# Patient Record
Sex: MALE | Race: WHITE | Employment: OTHER | URBAN - METROPOLITAN AREA
[De-identification: names, ages, dates, MRNs, and addresses within clinical notes are randomized per-mention and may not be internally consistent; named-entity substitution may affect disease eponyms.]

---

## 2019-08-29 ENCOUNTER — OFFICE VISIT (OUTPATIENT)
Dept: FAMILY MEDICINE CLINIC | Age: 39
End: 2019-08-29
Payer: COMMERCIAL

## 2019-08-29 VITALS
HEART RATE: 100 BPM | SYSTOLIC BLOOD PRESSURE: 122 MMHG | BODY MASS INDEX: 38.36 KG/M2 | RESPIRATION RATE: 18 BRPM | DIASTOLIC BLOOD PRESSURE: 84 MMHG | HEIGHT: 71 IN | WEIGHT: 274 LBS
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
## 2019-08-29 DIAGNOSIS — M25.561 CHRONIC PAIN OF RIGHT KNEE: ICD-10-CM

## 2019-08-29 DIAGNOSIS — S86.911A KNEE STRAIN, RIGHT, INITIAL ENCOUNTER: ICD-10-CM

## 2019-08-29 DIAGNOSIS — Z00.00 WELL ADULT EXAM: ICD-10-CM

## 2019-08-29 DIAGNOSIS — S76.311A RIGHT HAMSTRING STRAIN, INITIAL ENCOUNTER: Primary | ICD-10-CM

## 2019-08-29 DIAGNOSIS — G89.29 CHRONIC PAIN OF RIGHT KNEE: ICD-10-CM

## 2019-08-29 PROCEDURE — 99203 OFFICE O/P NEW LOW 30 MIN: CPT | Performed by: NURSE PRACTITIONER

## 2019-08-29 RX ORDER — IBUPROFEN 800 MG/1
800 TABLET ORAL PRN
COMMUNITY
Start: 2019-08-19 | End: 2022-08-25 | Stop reason: SDUPTHER

## 2019-08-29 ASSESSMENT — PATIENT HEALTH QUESTIONNAIRE - PHQ9
1. LITTLE INTEREST OR PLEASURE IN DOING THINGS: 0
SUM OF ALL RESPONSES TO PHQ QUESTIONS 1-9: 0
SUM OF ALL RESPONSES TO PHQ9 QUESTIONS 1 & 2: 0
SUM OF ALL RESPONSES TO PHQ QUESTIONS 1-9: 0
2. FEELING DOWN, DEPRESSED OR HOPELESS: 0

## 2019-08-29 NOTE — PATIENT INSTRUCTIONS
Physical Therapy. MRI of knee. Compression stockings 10 to 15 mmHg. Off Work for 2 weeks. Monitor for worsening symptoms. Call office with concerns. Continue with ibuprofen as needed. Routine fasting blood work. Patient Education        Hamstring Strain: Rehab Exercises  Introduction  Here are some examples of exercises for you to try. The exercises may be suggested for a condition or for rehabilitation. Start each exercise slowly. Ease off the exercises if you start to have pain. You will be told when to start these exercises and which ones will work best for you. How to do the exercises  Hamstring set (heel dig)    1. Sit with your affected leg bent. Your good leg should be straight and supported on the floor. 2. Tighten the muscles on the back of your bent leg (hamstring) by pressing your heel into the floor. 3. Hold for about 6 seconds, and then rest for up to 10 seconds. 4. Repeat 8 to 12 times. Hamstring curl    1. Lie on your stomach with your knees straight. Place a pillow under your stomach. If your kneecap is uncomfortable, roll up a washcloth and put it under your leg just above your kneecap. 2. Lift the foot of your affected leg by bending your knee so that you bring your foot up toward your buttock. If this motion hurts, try it without bending your knee quite as far. This may help you avoid any painful motion. 3. Slowly move your leg up and down. 4. Repeat 8 to 12 times. 5. When you can do this exercise with ease and no pain, add some resistance. To do this:  6. Tie the ends of an exercise band together to form a loop. Attach one end of the loop to a secure object or shut a door on it to hold it in place. (Or you can have someone hold one end of the loop to provide resistance.)  7. Loop the other end of the exercise band around the lower part of your affected leg. 8. Repeat steps 1 through 4, slowly pulling back on the exercise band with your leg.     Hip leg.  2. Keeping your back leg straight and your back heel on the floor, bend your front knee and gently bring your hip and chest toward the wall until you feel a stretch in the calf of your back leg. 3. Hold the stretch for 15 to 30 seconds. 4. Repeat 2 to 4 times. 5. Repeat steps 1 through 4, but this time keep your back knee bent. Single-leg balance    1. Stand on a flat surface with your arms stretched out to your sides like you are making the letter \"T. \" Then lift your good leg off the floor, bending it at the knee. If you are not steady on your feet, use one hand to hold on to a chair, counter, or wall. 2. Standing on your affected leg, keep that knee straight. Try to balance on that leg for up to 30 seconds. Then rest for up to 10 seconds. 3. Repeat 6 to 8 times. 4. When you can balance on your affected leg for 30 seconds with your eyes open, try to balance on it with your eyes closed. 5. When you can do this exercise with your eyes closed for 30 seconds and with ease and no pain, try standing on a pillow or piece of foam, and repeat steps 1 through 4. Follow-up care is a key part of your treatment and safety. Be sure to make and go to all appointments, and call your doctor if you are having problems. It's also a good idea to know your test results and keep a list of the medicines you take. Where can you learn more? Go to https://[employer]. org and sign in to your [employer] account. Enter [id number] in the [facility] box to learn more about \"Hamstring Strain: Rehab Exercises. \"     If you do not have an account, please click on the \"Sign Up Now\" link. Current as of: September 20, 2018  Content Version: 12.1  © 7521-2619 [employer], Inc[employer]. Care instructions adapted under license by [facility] ([facility]).  If you have questions about a medical condition or this instruction, always ask your healthcare professional. [facility] disclaims any warranty or liability for your use of this information. Patient Education        Learning About Venous Insufficiency  What is it? Venous insufficiency is a problem with the flow of blood from the veins of the legs back to the heart. It's also called chronic venous insufficiency or chronic venous stasis. Your veins bring blood back to the heart after it flows through your body. Veins have valves that keep the blood moving in one direction--toward the heart. But with venous insufficiency, the veins of the legs might not work as they should. This can allow blood to leak backward. Fluid can pool in the legs. This can lead to problems that include varicose veins. What causes it? Venous insufficiency is sometimes caused by deep vein thrombosis and high blood pressure inside leg veins. You are more likely to have venous insufficiency if you:  · Are older. · Are female. · Are overweight. · Don't get enough physical activity. · Smoke. · Have a family history of varicose veins. What are the symptoms? Symptoms of venous insufficiency affect the legs. They may include:  · Swelling, often in the ankles. · A rash. · Varicose veins. · Itching. · Cramping. · Skin sores (ulcers). · Aching or a feeling of heaviness. · Changes in skin color. How is it diagnosed? Your doctor can diagnose venous insufficiency by examining your legs and by using a type of ultrasound test (duplex Doppler) to find out how well blood is flowing in your legs. How is it treated? To reduce swelling and relieve pain caused by venous insufficiency, you can wear compression stockings. They are tighter at the ankles than at the top of the legs. They also can help venous skin ulcers heal. But there are different types of stockings, and they need to fit right. So your doctor will recommend what you need. You also can try to:  · Get more exercise, especially walking. It can increase blood flow.   · Avoid standing still or sitting for a long time, which can make the fluid pool in your legs. · Try not to sit with your legs crossed at the knee. · Keep your legs raised above your heart when you're lying down. This reduces swelling. If these treatments don't work, you may need medicine or a procedure to help relieve symptoms. Procedures might be done to close the vein, to remove the vein, or to improve blood flow. Follow-up care is a key part of your treatment and safety. Be sure to make and go to all appointments, and call your doctor if you are having problems. It's also a good idea to know your test results and keep a list of the medicines you take. Current as of: September 26, 2018  Content Version: 12.1  © 0813-9969 [employer]. Care instructions adapted under license by [facility] ([facility]). If you have questions about a medical condition or this instruction, always ask your healthcare professional. [place] [address] any warranty or liability for your use of this information. Patient Education        Learning About Using Compression Stockings  What are compression stockings? Compression stockings may be used for problems like varicose veins, skin ulcers, and deep vein thrombosis. But there are different types of stockings, and they need to fit right. So your doctor will recommend what you need. These stockings are the most common treatment for varicose veins. They help the blood circulate in your legs. This can prevent skin ulcers and keep blood from building up in the legs. Prescription stockings are tightest at the foot. They get less and less tight farther up on your legs. The kind you buy without a prescription have lighter elastic. So the pressure is even all the way up the leg. These don't cost as much. But they don't provide the compression you need to treat serious symptoms or prevent skin ulcers. How do you use compression stockings? · If your stockings are new, you may want to wash them before you put them on.  This

## 2019-08-29 NOTE — LETTER
[address] [facility] [address]  Phone: [phone]  Fax: [phone]    KYA Landis CNP        August 29, 2019     Patient: Ramin Holman   YOB: 1980   Date of Visit: 8/29/2019       To Whom it May Concern:    Ramin Holman was seen in my clinic on 8/29/2019. He may return to work on 9/12/2019. If you have any questions or concerns, please don't hesitate to call.     Sincerely,         KYA Landis CNP

## 2019-08-29 NOTE — PROGRESS NOTES
and moist and mucous membranes are normal. No oropharyngeal exudate. Eyes: Pupils are equal, round, and reactive to light. Conjunctivae are normal. Right eye exhibits no discharge. Left eye exhibits no discharge. Neck: Normal range of motion. Neck supple. No JVD present. Carotid bruit is not present. No tracheal deviation present. No thyromegaly present. Cardiovascular: Normal rate and regular rhythm. No murmur heard. Pulses:       Carotid pulses are 2+ on the right side, and 2+ on the left side. Radial pulses are 2+ on the right side, and 2+ on the left side. Posterior tibial pulses are 2+ on the right side, and 2+ on the left side. Pulmonary/Chest: Effort normal and breath sounds normal. No accessory muscle usage. No respiratory distress. He has no decreased breath sounds. He has no wheezes. Abdominal: Soft. Bowel sounds are normal. He exhibits no distension. There is no tenderness. There is no rebound, no guarding and no CVA tenderness. Musculoskeletal: He exhibits no edema. Right knee: He exhibits swelling. Tenderness found. Legs:  Lymphadenopathy:     He has no cervical adenopathy. Neurological: He is alert and oriented to person, place, and time. He displays no atrophy. He exhibits normal muscle tone. Coordination and gait normal.   Skin: Skin is warm and dry. No rash noted. Psychiatric: He has a normal mood and affect. His speech is normal and behavior is normal. Cognition and memory are normal.   Nursing note and vitals reviewed. Assessment / Plan:     1. Right hamstring strain, initial encounter    - [facility] [place]    2. Knee strain, right, initial encounter    - [facility] Physical Therapy - [place]  - MRI Knee Right WO Contrast; Future    3. Chronic pain of right knee    - [facility] Physical [facility] [place]  - MRI Knee Right WO Contrast; Future    4.  Fall, initial encounter    - MRI Knee Right WO Contrast; Future    5. Well adult exam    - Comprehensive Metabolic Panel, Fasting; Future  - CBC; Future  - Lipid, Fasting; Future    Physical Therapy. MRI of knee. Compression stockings 10 to 15 mmHg. Off Work for 2 weeks. Monitor for worsening symptoms. Call office with concerns. Continue with ibuprofen as needed. Routine fasting blood work. Return in about 2 weeks (around 9/12/2019). Babs Oviedo received counseling on the following healthy behaviors: nutrition, exercise and medication adherence  Reviewed prior labs and health maintenance. Continue current medications, diet and exercise. Discussed use, benefit, and side effects of prescribed medications. Barriers to medication compliance addressed. Patient given educational materials - see patient instructions. All patient questions answered. Patient voiced understanding.            Electronically signed by KYA Santana CNP on 9/8/2019 at 9:25 PM

## 2019-09-06 ENCOUNTER — HOSPITAL ENCOUNTER (OUTPATIENT)
Dept: MRI IMAGING | Facility: CLINIC | Age: 39
Discharge: HOME | End: 2019-09-08
Payer: COMMERCIAL

## 2019-09-06 DIAGNOSIS — G89.29 CHRONIC PAIN OF RIGHT KNEE: ICD-10-CM
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
## 2019-09-06 DIAGNOSIS — S86.911A KNEE STRAIN, RIGHT, INITIAL ENCOUNTER: ICD-10-CM

## 2019-09-06 DIAGNOSIS — R93.6 ABNORMAL MRI, KNEE: Primary | ICD-10-CM

## 2019-09-06 DIAGNOSIS — M25.561 CHRONIC PAIN OF RIGHT KNEE: ICD-10-CM

## 2019-09-06 PROCEDURE — 73721 MRI JNT OF LWR EXTRE W/O DYE: CPT

## 2019-09-08 ASSESSMENT — ENCOUNTER SYMPTOMS
ABDOMINAL PAIN: 0
EYE REDNESS: 0
VOICE CHANGE: 0
SHORTNESS OF BREATH: 0
CHEST TIGHTNESS: 0
CHOKING: 0
EYE ITCHING: 0
DIARRHEA: 0
EYE PAIN: 0
TROUBLE SWALLOWING: 0
COLOR CHANGE: 0
COUGH: 0
BLOOD IN STOOL: 0
VOMITING: 0
CONSTIPATION: 0
PHOTOPHOBIA: 0
NAUSEA: 0
EYE DISCHARGE: 0

## 2019-10-03 ENCOUNTER — HOSPITAL ENCOUNTER (EMERGENCY)
Facility: CLINIC | Age: 39
Discharge: HOME | End: 2019-10-03
Attending: EMERGENCY MEDICINE
Payer: COMMERCIAL

## 2019-10-03 ENCOUNTER — APPOINTMENT (OUTPATIENT)
Dept: CT IMAGING | Facility: CLINIC | Age: 39
End: 2019-10-03
Payer: COMMERCIAL

## 2019-10-03 ENCOUNTER — OFFICE VISIT (OUTPATIENT)
Dept: FAMILY MEDICINE CLINIC | Age: 39
End: 2019-10-03
Payer: COMMERCIAL

## 2019-10-03 ENCOUNTER — HOSPITAL ENCOUNTER (OUTPATIENT)
Age: 39
Setting detail: SPECIMEN
Discharge: HOME | End: 2019-10-03
Payer: COMMERCIAL

## 2019-10-03 ENCOUNTER — APPOINTMENT (OUTPATIENT)
Dept: ULTRASOUND IMAGING | Facility: CLINIC | Age: 39
End: 2019-10-03
Payer: COMMERCIAL

## 2019-10-03 VITALS
DIASTOLIC BLOOD PRESSURE: 84 MMHG | WEIGHT: 276 LBS | TEMPERATURE: 97.3 F | SYSTOLIC BLOOD PRESSURE: 138 MMHG | HEART RATE: 88 BPM | RESPIRATION RATE: 16 BRPM | BODY MASS INDEX: 38.64 KG/M2 | OXYGEN SATURATION: 99 % | HEIGHT: 71 IN

## 2019-10-03 VITALS
DIASTOLIC BLOOD PRESSURE: 84 MMHG | SYSTOLIC BLOOD PRESSURE: 161 MMHG | WEIGHT: 264 LBS | TEMPERATURE: 98.4 F | BODY MASS INDEX: 36.96 KG/M2 | RESPIRATION RATE: 20 BRPM | HEART RATE: 76 BPM | HEIGHT: 71 IN | OXYGEN SATURATION: 100 %

## 2019-10-03 DIAGNOSIS — M54.50 ACUTE RIGHT-SIDED LOW BACK PAIN WITHOUT SCIATICA: Primary | ICD-10-CM

## 2019-10-03 DIAGNOSIS — R10.9 ACUTE RIGHT FLANK PAIN: ICD-10-CM

## 2019-10-03 DIAGNOSIS — R10.9 FLANK PAIN: Primary | ICD-10-CM

## 2019-10-03 DIAGNOSIS — R10.9 FLANK PAIN: ICD-10-CM

## 2019-10-03 LAB
-: ABNORMAL
ABSOLUTE EOS #: 0 K/UL (ref 0–0.4)
ABSOLUTE IMMATURE GRANULOCYTE: ABNORMAL K/UL (ref 0–0.3)
ABSOLUTE LYMPH #: 1.7 K/UL (ref 1–4.8)
ABSOLUTE MONO #: 1.1 K/UL (ref 0.1–1.2)
AMORPHOUS: ABNORMAL
ANION GAP SERPL CALCULATED.3IONS-SCNC: 15 MMOL/L (ref 9–17)
BACTERIA: ABNORMAL
BASOPHILS # BLD: 1 % (ref 0–2)
BASOPHILS ABSOLUTE: 0.1 K/UL (ref 0–0.2)
BILIRUBIN URINE: ABNORMAL
BILIRUBIN, POC: NEGATIVE
BLOOD URINE, POC: ABNORMAL
BUN BLDV-MCNC: 12 MG/DL (ref 6–20)
BUN/CREAT BLD: ABNORMAL (ref 9–20)
CALCIUM SERPL-MCNC: 9.7 MG/DL (ref 8.6–10.4)
CASTS UA: ABNORMAL /LPF (ref 0–2)
CHLORIDE BLD-SCNC: 100 MMOL/L (ref 98–107)
CLARITY, POC: CLEAR
CO2: 23 MMOL/L (ref 20–31)
COLOR, POC: ABNORMAL
COLOR: YELLOW
COMMENT UA: ABNORMAL
CREAT SERPL-MCNC: 0.9 MG/DL (ref 0.7–1.2)
CRYSTALS, UA: ABNORMAL /HPF
DIFFERENTIAL TYPE: ABNORMAL
EOSINOPHILS RELATIVE PERCENT: 0 % (ref 1–4)
EPITHELIAL CELLS UA: ABNORMAL /HPF (ref 0–5)
GFR AFRICAN AMERICAN: >60 ML/MIN
GFR NON-AFRICAN AMERICAN: >60 ML/MIN
GFR SERPL CREATININE-BSD FRML MDRD: ABNORMAL ML/MIN/{1.73_M2}
GFR SERPL CREATININE-BSD FRML MDRD: ABNORMAL ML/MIN/{1.73_M2}
GLUCOSE BLD-MCNC: 141 MG/DL (ref 70–99)
GLUCOSE URINE, POC: NEGATIVE
GLUCOSE URINE: NEGATIVE
HCT VFR BLD CALC: 45.1 % (ref 41–53)
HEMOGLOBIN: 15.3 G/DL (ref 13.5–17.5)
IMMATURE GRANULOCYTES: ABNORMAL %
KETONES, POC: ABNORMAL
KETONES, URINE: ABNORMAL
LEUKOCYTE EST, POC: ABNORMAL
LEUKOCYTE ESTERASE, URINE: NEGATIVE
LYMPHOCYTES # BLD: 12 % (ref 24–44)
MCH RBC QN AUTO: 31.6 PG (ref 26–34)
MCHC RBC AUTO-ENTMCNC: 33.9 G/DL (ref 31–37)
MCV RBC AUTO: 93.1 FL (ref 80–100)
MONOCYTES # BLD: 8 % (ref 2–11)
MUCUS: ABNORMAL
NITRITE, POC: NEGATIVE
NITRITE, URINE: NEGATIVE
NRBC AUTOMATED: ABNORMAL PER 100 WBC
OTHER OBSERVATIONS UA: ABNORMAL
PDW BLD-RTO: 14.1 % (ref 12.5–15.4)
PH UA: 8.5 (ref 5–8)
PH, POC: 6.5
PLATELET # BLD: 247 K/UL (ref 140–450)
PLATELET ESTIMATE: ABNORMAL
PMV BLD AUTO: 9.2 FL (ref 6–12)
POTASSIUM SERPL-SCNC: 3.9 MMOL/L (ref 3.7–5.3)
PROTEIN UA: ABNORMAL
PROTEIN, POC: ABNORMAL
RBC # BLD: 4.85 M/UL (ref 4.5–5.9)
RBC # BLD: ABNORMAL 10*6/UL
RBC UA: ABNORMAL /HPF (ref 0–2)
RENAL EPITHELIAL, UA: ABNORMAL /HPF
SEG NEUTROPHILS: 79 % (ref 36–66)
SEGMENTED NEUTROPHILS ABSOLUTE COUNT: 11.2 K/UL (ref 1.8–7.7)
SODIUM BLD-SCNC: 138 MMOL/L (ref 135–144)
SPECIFIC GRAVITY UA: 1.01 (ref 1–1.03)
SPECIFIC GRAVITY, POC: 1.01
TRICHOMONAS: ABNORMAL
TURBIDITY: CLEAR
URINE HGB: ABNORMAL
UROBILINOGEN, POC: NEGATIVE
UROBILINOGEN, URINE: NORMAL
WBC # BLD: 14.1 K/UL (ref 3.5–11)
WBC # BLD: ABNORMAL 10*3/UL
WBC UA: ABNORMAL /HPF (ref 0–5)
YEAST: ABNORMAL

## 2019-10-03 PROCEDURE — 99213 OFFICE O/P EST LOW 20 MIN: CPT | Performed by: NURSE PRACTITIONER

## 2019-10-03 PROCEDURE — 74176 CT ABD & PELVIS W/O CONTRAST: CPT

## 2019-10-03 PROCEDURE — 96374 THER/PROPH/DIAG INJ IV PUSH: CPT

## 2019-10-03 PROCEDURE — 80048 BASIC METABOLIC PNL TOTAL CA: CPT

## 2019-10-03 PROCEDURE — 6360000002 HC RX W HCPCS: Performed by: EMERGENCY MEDICINE

## 2019-10-03 PROCEDURE — 76870 US EXAM SCROTUM: CPT

## 2019-10-03 PROCEDURE — 96376 TX/PRO/DX INJ SAME DRUG ADON: CPT

## 2019-10-03 PROCEDURE — 81001 URINALYSIS AUTO W/SCOPE: CPT

## 2019-10-03 PROCEDURE — 81002 URINALYSIS NONAUTO W/O SCOPE: CPT | Performed by: NURSE PRACTITIONER

## 2019-10-03 PROCEDURE — 36415 COLL VENOUS BLD VENIPUNCTURE: CPT

## 2019-10-03 PROCEDURE — 96375 TX/PRO/DX INJ NEW DRUG ADDON: CPT

## 2019-10-03 PROCEDURE — 2580000003 HC RX 258: Performed by: EMERGENCY MEDICINE

## 2019-10-03 PROCEDURE — 85025 COMPLETE CBC W/AUTO DIFF WBC: CPT

## 2019-10-03 PROCEDURE — 99284 EMERGENCY DEPT VISIT MOD MDM: CPT

## 2019-10-03 PROCEDURE — 93976 VASCULAR STUDY: CPT

## 2019-10-03 RX ORDER — KETOROLAC TROMETHAMINE 30 MG/ML
30 INJECTION, SOLUTION INTRAMUSCULAR; INTRAVENOUS ONCE
Status: COMPLETED | OUTPATIENT
Start: 2019-10-03 | End: 2019-10-03

## 2019-10-03 RX ORDER — FENTANYL CITRATE 50 UG/ML
50 INJECTION, SOLUTION INTRAMUSCULAR; INTRAVENOUS ONCE
Status: COMPLETED | OUTPATIENT
Start: 2019-10-03 | End: 2019-10-03

## 2019-10-03 RX ORDER — CYCLOBENZAPRINE HCL 10 MG
10 TABLET ORAL 3 TIMES DAILY PRN
Qty: 12 TABLET | Refills: 0 | Status: SHIPPED | OUTPATIENT
Start: 2019-10-03

## 2019-10-03 RX ORDER — HYDROCODONE BITARTRATE AND ACETAMINOPHEN 5; 325 MG/1; MG/1
1 TABLET ORAL EVERY 6 HOURS PRN
Qty: 12 TABLET | Refills: 0 | Status: SHIPPED | OUTPATIENT
Start: 2019-10-03 | End: 2019-10-06

## 2019-10-03 RX ORDER — ONDANSETRON 2 MG/ML
4 INJECTION INTRAMUSCULAR; INTRAVENOUS ONCE
Status: COMPLETED | OUTPATIENT
Start: 2019-10-03 | End: 2019-10-03

## 2019-10-03 RX ORDER — 0.9 % SODIUM CHLORIDE 0.9 %
1000 INTRAVENOUS SOLUTION INTRAVENOUS ONCE
Status: COMPLETED | OUTPATIENT
Start: 2019-10-03 | End: 2019-10-03

## 2019-10-03 RX ADMIN — FENTANYL CITRATE 50 MCG: 50 INJECTION INTRAMUSCULAR; INTRAVENOUS at 12:47

## 2019-10-03 RX ADMIN — ONDANSETRON 4 MG: 2 INJECTION INTRAMUSCULAR; INTRAVENOUS at 11:55

## 2019-10-03 RX ADMIN — SODIUM CHLORIDE 1000 ML: 9 INJECTION, SOLUTION INTRAVENOUS at 11:55

## 2019-10-03 RX ADMIN — FENTANYL CITRATE 50 MCG: 50 INJECTION INTRAMUSCULAR; INTRAVENOUS at 11:56

## 2019-10-03 RX ADMIN — KETOROLAC TROMETHAMINE 30 MG: 30 INJECTION, SOLUTION INTRAMUSCULAR; INTRAVENOUS at 12:43

## 2019-10-03 ASSESSMENT — ENCOUNTER SYMPTOMS
CHEST TIGHTNESS: 0
CONSTIPATION: 0
NAUSEA: 0
SHORTNESS OF BREATH: 0
ABDOMINAL PAIN: 1

## 2019-10-03 ASSESSMENT — PAIN SCALES - GENERAL
PAINLEVEL_OUTOF10: 10
PAINLEVEL_OUTOF10: 6
PAINLEVEL_OUTOF10: 10
PAINLEVEL_OUTOF10: 7
PAINLEVEL_OUTOF10: 10

## 2019-10-03 ASSESSMENT — PAIN DESCRIPTION - LOCATION
LOCATION: BACK

## 2019-10-03 ASSESSMENT — PAIN DESCRIPTION - PROGRESSION
CLINICAL_PROGRESSION: NOT CHANGED
CLINICAL_PROGRESSION: GRADUALLY IMPROVING
CLINICAL_PROGRESSION: GRADUALLY WORSENING
CLINICAL_PROGRESSION: GRADUALLY IMPROVING

## 2019-10-03 ASSESSMENT — PAIN DESCRIPTION - PAIN TYPE
TYPE: ACUTE PAIN
TYPE: ACUTE PAIN

## 2019-10-03 ASSESSMENT — PAIN DESCRIPTION - FREQUENCY: FREQUENCY: CONTINUOUS

## 2019-10-03 ASSESSMENT — PAIN DESCRIPTION - ONSET: ONSET: SUDDEN

## 2019-10-03 ASSESSMENT — PAIN DESCRIPTION - DESCRIPTORS: DESCRIPTORS: CONSTANT

## 2019-10-03 ASSESSMENT — PAIN DESCRIPTION - ORIENTATION: ORIENTATION: RIGHT;LOWER

## 2019-10-04 ENCOUNTER — OFFICE VISIT (OUTPATIENT)
Dept: FAMILY MEDICINE CLINIC | Age: 39
End: 2019-10-04
Payer: COMMERCIAL

## 2019-10-04 VITALS
DIASTOLIC BLOOD PRESSURE: 80 MMHG | OXYGEN SATURATION: 98 % | SYSTOLIC BLOOD PRESSURE: 128 MMHG | HEIGHT: 71 IN | HEART RATE: 82 BPM | WEIGHT: 270 LBS | BODY MASS INDEX: 37.8 KG/M2

## 2019-10-04 DIAGNOSIS — R31.1 BENIGN ESSENTIAL MICROSCOPIC HEMATURIA: ICD-10-CM

## 2019-10-04 DIAGNOSIS — Z11.4 SCREENING FOR HIV (HUMAN IMMUNODEFICIENCY VIRUS): ICD-10-CM

## 2019-10-04 DIAGNOSIS — M54.50 ACUTE BILATERAL LOW BACK PAIN WITHOUT SCIATICA: Primary | ICD-10-CM

## 2019-10-04 LAB
CULTURE: NORMAL
Lab: NORMAL
SPECIMEN DESCRIPTION: NORMAL

## 2019-10-04 PROCEDURE — 99213 OFFICE O/P EST LOW 20 MIN: CPT | Performed by: NURSE PRACTITIONER

## 2019-10-07 ENCOUNTER — HOSPITAL ENCOUNTER (OUTPATIENT)
Age: 39
Setting detail: SPECIMEN
Discharge: HOME | End: 2019-10-07
Payer: COMMERCIAL

## 2019-10-07 DIAGNOSIS — Z00.00 WELL ADULT EXAM: ICD-10-CM

## 2019-10-07 DIAGNOSIS — Z11.4 SCREENING FOR HIV (HUMAN IMMUNODEFICIENCY VIRUS): ICD-10-CM

## 2019-10-07 LAB
ALBUMIN SERPL-MCNC: 3.9 G/DL (ref 3.5–5.2)
ALBUMIN/GLOBULIN RATIO: 1.3 (ref 1–2.5)
ALP BLD-CCNC: 86 U/L (ref 40–129)
ALT SERPL-CCNC: 14 U/L (ref 5–41)
ANION GAP SERPL CALCULATED.3IONS-SCNC: 14 MMOL/L (ref 9–17)
AST SERPL-CCNC: 14 U/L
BILIRUB SERPL-MCNC: 0.53 MG/DL (ref 0.3–1.2)
BUN BLDV-MCNC: 10 MG/DL (ref 6–20)
BUN/CREAT BLD: NORMAL (ref 9–20)
CALCIUM SERPL-MCNC: 9.4 MG/DL (ref 8.6–10.4)
CHLORIDE BLD-SCNC: 100 MMOL/L (ref 98–107)
CHOLESTEROL, FASTING: 164 MG/DL
CHOLESTEROL/HDL RATIO: 3.3
CO2: 24 MMOL/L (ref 20–31)
CREAT SERPL-MCNC: 0.82 MG/DL (ref 0.7–1.2)
GFR AFRICAN AMERICAN: >60 ML/MIN
GFR NON-AFRICAN AMERICAN: >60 ML/MIN
GFR SERPL CREATININE-BSD FRML MDRD: NORMAL ML/MIN/{1.73_M2}
GFR SERPL CREATININE-BSD FRML MDRD: NORMAL ML/MIN/{1.73_M2}
GLUCOSE FASTING: 91 MG/DL (ref 70–99)
HCT VFR BLD CALC: 45 % (ref 40.7–50.3)
HDLC SERPL-MCNC: 50 MG/DL
HEMOGLOBIN: 14.7 G/DL (ref 13–17)
HIV AG/AB: NONREACTIVE
LDL CHOLESTEROL: 103 MG/DL (ref 0–130)
MCH RBC QN AUTO: 31.4 PG (ref 25.2–33.5)
MCHC RBC AUTO-ENTMCNC: 32.7 G/DL (ref 28.4–34.8)
MCV RBC AUTO: 96.2 FL (ref 82.6–102.9)
NRBC AUTOMATED: 0 PER 100 WBC
PDW BLD-RTO: 13.2 % (ref 11.8–14.4)
PLATELET # BLD: 264 K/UL (ref 138–453)
PMV BLD AUTO: 11 FL (ref 8.1–13.5)
POTASSIUM SERPL-SCNC: 4 MMOL/L (ref 3.7–5.3)
RBC # BLD: 4.68 M/UL (ref 4.21–5.77)
SODIUM BLD-SCNC: 138 MMOL/L (ref 135–144)
TOTAL PROTEIN: 6.9 G/DL (ref 6.4–8.3)
TRIGLYCERIDE, FASTING: 54 MG/DL
VLDLC SERPL CALC-MCNC: NORMAL MG/DL (ref 1–30)
WBC # BLD: 7.6 K/UL (ref 3.5–11.3)

## 2019-10-07 ASSESSMENT — ENCOUNTER SYMPTOMS
CONSTIPATION: 0
BACK PAIN: 1
SHORTNESS OF BREATH: 0
ABDOMINAL PAIN: 1
CHEST TIGHTNESS: 0
NAUSEA: 0

## 2020-07-25 ENCOUNTER — APPOINTMENT (OUTPATIENT)
Dept: GENERAL RADIOLOGY | Age: 40
DRG: 342 | End: 2020-07-25
Payer: MEDICAID

## 2020-07-25 ENCOUNTER — APPOINTMENT (OUTPATIENT)
Dept: CT IMAGING | Age: 40
DRG: 342 | End: 2020-07-25
Payer: MEDICAID

## 2020-07-25 ENCOUNTER — HOSPITAL ENCOUNTER (INPATIENT)
Age: 40
LOS: 4 days | Discharge: HOME | DRG: 342 | End: 2020-07-29
Attending: EMERGENCY MEDICINE | Admitting: SURGERY
Payer: MEDICAID

## 2020-07-25 PROBLEM — T14.90XA TRAUMA: Status: ACTIVE | Noted: 2020-07-25

## 2020-07-25 LAB
ABO/RH: NORMAL
ALLEN TEST: ABNORMAL
AMPHETAMINE SCREEN URINE: NEGATIVE
ANION GAP SERPL CALCULATED.3IONS-SCNC: 12 MMOL/L (ref 9–17)
ANTIBODY SCREEN: NEGATIVE
ARM BAND NUMBER: NORMAL
BARBITURATE SCREEN URINE: NEGATIVE
BENZODIAZEPINE SCREEN, URINE: NEGATIVE
BILIRUBIN URINE: NEGATIVE
BLOOD BANK SPECIMEN: ABNORMAL
BUN BLDV-MCNC: 10 MG/DL (ref 6–20)
BUPRENORPHINE URINE: ABNORMAL
CANNABINOID SCREEN URINE: POSITIVE
CARBOXYHEMOGLOBIN: 5 % (ref 0–5)
CHLORIDE BLD-SCNC: 111 MMOL/L (ref 98–107)
CO2: 19 MMOL/L (ref 20–31)
COCAINE METABOLITE, URINE: NEGATIVE
COLOR: YELLOW
COMMENT UA: NORMAL
CREAT SERPL-MCNC: 0.77 MG/DL (ref 0.7–1.2)
ETHANOL PERCENT: 0.1 %
ETHANOL: 97 MG/DL
EXPIRATION DATE: NORMAL
FIO2: ABNORMAL
GFR AFRICAN AMERICAN: ABNORMAL ML/MIN
GFR NON-AFRICAN AMERICAN: ABNORMAL ML/MIN
GFR SERPL CREATININE-BSD FRML MDRD: ABNORMAL ML/MIN/{1.73_M2}
GFR SERPL CREATININE-BSD FRML MDRD: ABNORMAL ML/MIN/{1.73_M2}
GLUCOSE BLD-MCNC: 103 MG/DL (ref 70–99)
GLUCOSE URINE: NEGATIVE
HCG QUALITATIVE: ABNORMAL
HCO3 VENOUS: 23.5 MMOL/L (ref 24–30)
HCT VFR BLD CALC: 37.6 % (ref 40.7–50.3)
HEMOGLOBIN: 12.3 G/DL (ref 13–17)
INR BLD: 1.1
KETONES, URINE: NEGATIVE
LEUKOCYTE ESTERASE, URINE: NEGATIVE
MCH RBC QN AUTO: 31.9 PG (ref 25.2–33.5)
MCHC RBC AUTO-ENTMCNC: 32.7 G/DL (ref 28.4–34.8)
MCV RBC AUTO: 97.4 FL (ref 82.6–102.9)
MDMA URINE: ABNORMAL
METHADONE SCREEN, URINE: NEGATIVE
METHAMPHETAMINE, URINE: ABNORMAL
METHEMOGLOBIN: ABNORMAL % (ref 0–1.5)
MODE: ABNORMAL
NEGATIVE BASE EXCESS, VEN: ABNORMAL MMOL/L (ref 0–2)
NITRITE, URINE: NEGATIVE
NOTIFICATION TIME: ABNORMAL
NOTIFICATION: ABNORMAL
NRBC AUTOMATED: 0 PER 100 WBC
O2 DEVICE/FLOW/%: ABNORMAL
O2 SAT, VEN: 95.4 % (ref 60–85)
OPIATES, URINE: NEGATIVE
OXYCODONE SCREEN URINE: NEGATIVE
OXYHEMOGLOBIN: ABNORMAL % (ref 95–98)
PARTIAL THROMBOPLASTIN TIME: 22.5 SEC (ref 20.5–30.5)
PATIENT TEMP: 37
PCO2, VEN, TEMP ADJ: ABNORMAL MMHG (ref 39–55)
PCO2, VEN: 34.5 (ref 39–55)
PDW BLD-RTO: 14.6 % (ref 11.8–14.4)
PEEP/CPAP: ABNORMAL
PH UA: 6.5 (ref 5–8)
PH VENOUS: 7.45 (ref 7.32–7.42)
PH, VEN, TEMP ADJ: ABNORMAL (ref 7.32–7.42)
PHENCYCLIDINE, URINE: NEGATIVE
PLATELET # BLD: 202 K/UL (ref 138–453)
PMV BLD AUTO: 10.2 FL (ref 8.1–13.5)
PO2, VEN, TEMP ADJ: ABNORMAL MMHG (ref 30–50)
PO2, VEN: 71.4 (ref 30–50)
POSITIVE BASE EXCESS, VEN: 0.5 MMOL/L (ref 0–2)
POTASSIUM SERPL-SCNC: 3.3 MMOL/L (ref 3.7–5.3)
PROPOXYPHENE, URINE: ABNORMAL
PROTEIN UA: NEGATIVE
PROTHROMBIN TIME: 11.5 SEC (ref 9–12)
PSV: ABNORMAL
PT. POSITION: ABNORMAL
RBC # BLD: 3.86 M/UL (ref 4.21–5.77)
RESPIRATORY RATE: ABNORMAL
SAMPLE SITE: ABNORMAL
SARS-COV-2, PCR: NORMAL
SARS-COV-2, RAPID: NOT DETECTED
SARS-COV-2: NORMAL
SET RATE: ABNORMAL
SODIUM BLD-SCNC: 142 MMOL/L (ref 135–144)
SOURCE: NORMAL
SPECIFIC GRAVITY UA: 1.02 (ref 1–1.03)
TEST INFORMATION: ABNORMAL
TEXT FOR RESPIRATORY: ABNORMAL
TOTAL HB: ABNORMAL G/DL (ref 12–16)
TOTAL RATE: ABNORMAL
TRICYCLIC ANTIDEPRESSANTS, UR: ABNORMAL
TURBIDITY: CLEAR
URINE HGB: NEGATIVE
UROBILINOGEN, URINE: NORMAL
VT: ABNORMAL
WBC # BLD: 8.9 K/UL (ref 3.5–11.3)

## 2020-07-25 PROCEDURE — 73562 X-RAY EXAM OF KNEE 3: CPT

## 2020-07-25 PROCEDURE — 99285 EMERGENCY DEPT VISIT HI MDM: CPT

## 2020-07-25 PROCEDURE — 6360000004 HC RX CONTRAST MEDICATION: Performed by: STUDENT IN AN ORGANIZED HEALTH CARE EDUCATION/TRAINING PROGRAM

## 2020-07-25 PROCEDURE — 72131 CT LUMBAR SPINE W/O DYE: CPT

## 2020-07-25 PROCEDURE — 73590 X-RAY EXAM OF LOWER LEG: CPT

## 2020-07-25 PROCEDURE — 81003 URINALYSIS AUTO W/O SCOPE: CPT

## 2020-07-25 PROCEDURE — 96376 TX/PRO/DX INJ SAME DRUG ADON: CPT

## 2020-07-25 PROCEDURE — 6370000000 HC RX 637 (ALT 250 FOR IP): Performed by: STUDENT IN AN ORGANIZED HEALTH CARE EDUCATION/TRAINING PROGRAM

## 2020-07-25 PROCEDURE — 86901 BLOOD TYPING SEROLOGIC RH(D): CPT

## 2020-07-25 PROCEDURE — 6360000002 HC RX W HCPCS: Performed by: GENERAL PRACTICE

## 2020-07-25 PROCEDURE — 1200000000 HC SEMI PRIVATE

## 2020-07-25 PROCEDURE — 82947 ASSAY GLUCOSE BLOOD QUANT: CPT

## 2020-07-25 PROCEDURE — G0480 DRUG TEST DEF 1-7 CLASSES: HCPCS

## 2020-07-25 PROCEDURE — 96375 TX/PRO/DX INJ NEW DRUG ADDON: CPT

## 2020-07-25 PROCEDURE — 70450 CT HEAD/BRAIN W/O DYE: CPT

## 2020-07-25 PROCEDURE — 86850 RBC ANTIBODY SCREEN: CPT

## 2020-07-25 PROCEDURE — 84520 ASSAY OF UREA NITROGEN: CPT

## 2020-07-25 PROCEDURE — 85027 COMPLETE CBC AUTOMATED: CPT

## 2020-07-25 PROCEDURE — 71045 X-RAY EXAM CHEST 1 VIEW: CPT

## 2020-07-25 PROCEDURE — 82805 BLOOD GASES W/O2 SATURATION: CPT

## 2020-07-25 PROCEDURE — 72125 CT NECK SPINE W/O DYE: CPT

## 2020-07-25 PROCEDURE — 6810039001 HC L1 TRAUMA PRIORITY

## 2020-07-25 PROCEDURE — 84703 CHORIONIC GONADOTROPIN ASSAY: CPT

## 2020-07-25 PROCEDURE — 86900 BLOOD TYPING SEROLOGIC ABO: CPT

## 2020-07-25 PROCEDURE — 96374 THER/PROPH/DIAG INJ IV PUSH: CPT

## 2020-07-25 PROCEDURE — 73700 CT LOWER EXTREMITY W/O DYE: CPT

## 2020-07-25 PROCEDURE — 73610 X-RAY EXAM OF ANKLE: CPT

## 2020-07-25 PROCEDURE — 74177 CT ABD & PELVIS W/CONTRAST: CPT

## 2020-07-25 PROCEDURE — 85610 PROTHROMBIN TIME: CPT

## 2020-07-25 PROCEDURE — 80051 ELECTROLYTE PANEL: CPT

## 2020-07-25 PROCEDURE — 72128 CT CHEST SPINE W/O DYE: CPT

## 2020-07-25 PROCEDURE — 80307 DRUG TEST PRSMV CHEM ANLYZR: CPT

## 2020-07-25 PROCEDURE — 82565 ASSAY OF CREATININE: CPT

## 2020-07-25 PROCEDURE — U0002 COVID-19 LAB TEST NON-CDC: HCPCS

## 2020-07-25 PROCEDURE — 85730 THROMBOPLASTIN TIME PARTIAL: CPT

## 2020-07-25 RX ORDER — PROMETHAZINE HYDROCHLORIDE 25 MG/1
12.5 TABLET ORAL EVERY 6 HOURS PRN
Status: DISCONTINUED | OUTPATIENT
Start: 2020-07-25 | End: 2020-07-27

## 2020-07-25 RX ORDER — POLYETHYLENE GLYCOL 3350 17 G/17G
17 POWDER, FOR SOLUTION ORAL DAILY PRN
Status: DISCONTINUED | OUTPATIENT
Start: 2020-07-25 | End: 2020-07-27

## 2020-07-25 RX ORDER — MORPHINE SULFATE 4 MG/ML
4 INJECTION, SOLUTION INTRAMUSCULAR; INTRAVENOUS ONCE
Status: COMPLETED | OUTPATIENT
Start: 2020-07-25 | End: 2020-07-25

## 2020-07-25 RX ORDER — ACETAMINOPHEN 500 MG
1000 TABLET ORAL EVERY 8 HOURS SCHEDULED
Status: DISCONTINUED | OUTPATIENT
Start: 2020-07-25 | End: 2020-07-29 | Stop reason: HOSPADM

## 2020-07-25 RX ORDER — ONDANSETRON 2 MG/ML
4 INJECTION INTRAMUSCULAR; INTRAVENOUS EVERY 6 HOURS PRN
Status: DISCONTINUED | OUTPATIENT
Start: 2020-07-25 | End: 2020-07-29 | Stop reason: HOSPADM

## 2020-07-25 RX ORDER — ONDANSETRON 2 MG/ML
4 INJECTION INTRAMUSCULAR; INTRAVENOUS ONCE
Status: COMPLETED | OUTPATIENT
Start: 2020-07-25 | End: 2020-07-25

## 2020-07-25 RX ORDER — SODIUM CHLORIDE 0.9 % (FLUSH) 0.9 %
10 SYRINGE (ML) INJECTION EVERY 12 HOURS SCHEDULED
Status: DISCONTINUED | OUTPATIENT
Start: 2020-07-25 | End: 2020-07-29 | Stop reason: HOSPADM

## 2020-07-25 RX ORDER — SODIUM CHLORIDE 0.9 % (FLUSH) 0.9 %
10 SYRINGE (ML) INJECTION PRN
Status: DISCONTINUED | OUTPATIENT
Start: 2020-07-25 | End: 2020-07-29 | Stop reason: HOSPADM

## 2020-07-25 RX ORDER — OXYCODONE HYDROCHLORIDE 5 MG/1
5 TABLET ORAL EVERY 4 HOURS PRN
Status: DISCONTINUED | OUTPATIENT
Start: 2020-07-25 | End: 2020-07-29 | Stop reason: HOSPADM

## 2020-07-25 RX ADMIN — ACETAMINOPHEN 1000 MG: 500 TABLET ORAL at 23:22

## 2020-07-25 RX ADMIN — ONDANSETRON 4 MG: 2 INJECTION, SOLUTION INTRAMUSCULAR; INTRAVENOUS at 19:27

## 2020-07-25 RX ADMIN — MORPHINE SULFATE 4 MG: 4 INJECTION INTRAVENOUS at 22:39

## 2020-07-25 RX ADMIN — MORPHINE SULFATE 4 MG: 4 INJECTION, SOLUTION INTRAMUSCULAR; INTRAVENOUS at 19:27

## 2020-07-25 RX ADMIN — IOHEXOL 130 ML: 350 INJECTION, SOLUTION INTRAVENOUS at 16:58

## 2020-07-25 RX ADMIN — OXYCODONE HYDROCHLORIDE 5 MG: 5 TABLET ORAL at 23:22

## 2020-07-25 ASSESSMENT — PAIN SCALES - GENERAL
PAINLEVEL_OUTOF10: 8

## 2020-07-25 NOTE — ED NOTES
Bed: 16  Expected date:   Expected time:   Means of arrival:   Comments:     Alina Kumar RN  07/25/20 2135
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
## 2020-07-26 ENCOUNTER — APPOINTMENT (OUTPATIENT)
Dept: MRI IMAGING | Age: 40
DRG: 342 | End: 2020-07-26
Payer: MEDICAID

## 2020-07-26 ENCOUNTER — APPOINTMENT (OUTPATIENT)
Dept: CT IMAGING | Age: 40
DRG: 342 | End: 2020-07-26
Payer: MEDICAID

## 2020-07-26 LAB
ABSOLUTE EOS #: 0.1 K/UL (ref 0–0.44)
ABSOLUTE IMMATURE GRANULOCYTE: 0.05 K/UL (ref 0–0.3)
ABSOLUTE LYMPH #: 2.08 K/UL (ref 1.1–3.7)
ABSOLUTE MONO #: 1.17 K/UL (ref 0.1–1.2)
ANION GAP SERPL CALCULATED.3IONS-SCNC: 8 MMOL/L (ref 9–17)
BASOPHILS # BLD: 0 % (ref 0–2)
BASOPHILS ABSOLUTE: 0.04 K/UL (ref 0–0.2)
BUN BLDV-MCNC: 9 MG/DL (ref 6–20)
BUN/CREAT BLD: ABNORMAL (ref 9–20)
CALCIUM SERPL-MCNC: 8.1 MG/DL (ref 8.6–10.4)
CHLORIDE BLD-SCNC: 102 MMOL/L (ref 98–107)
CO2: 27 MMOL/L (ref 20–31)
CREAT SERPL-MCNC: 0.72 MG/DL (ref 0.7–1.2)
DIFFERENTIAL TYPE: ABNORMAL
EOSINOPHILS RELATIVE PERCENT: 1 % (ref 1–4)
GFR AFRICAN AMERICAN: >60 ML/MIN
GFR NON-AFRICAN AMERICAN: >60 ML/MIN
GFR SERPL CREATININE-BSD FRML MDRD: ABNORMAL ML/MIN/{1.73_M2}
GFR SERPL CREATININE-BSD FRML MDRD: ABNORMAL ML/MIN/{1.73_M2}
GLUCOSE BLD-MCNC: 100 MG/DL (ref 70–99)
HCT VFR BLD CALC: 43.1 % (ref 40.7–50.3)
HEMOGLOBIN: 14.1 G/DL (ref 13–17)
IMMATURE GRANULOCYTES: 1 %
LYMPHOCYTES # BLD: 22 % (ref 24–43)
MCH RBC QN AUTO: 31.8 PG (ref 25.2–33.5)
MCHC RBC AUTO-ENTMCNC: 32.7 G/DL (ref 28.4–34.8)
MCV RBC AUTO: 97.1 FL (ref 82.6–102.9)
MONOCYTES # BLD: 12 % (ref 3–12)
NRBC AUTOMATED: 0 PER 100 WBC
PDW BLD-RTO: 14.6 % (ref 11.8–14.4)
PLATELET # BLD: 218 K/UL (ref 138–453)
PLATELET ESTIMATE: ABNORMAL
PMV BLD AUTO: 10.2 FL (ref 8.1–13.5)
POTASSIUM SERPL-SCNC: 4 MMOL/L (ref 3.7–5.3)
RBC # BLD: 4.44 M/UL (ref 4.21–5.77)
RBC # BLD: ABNORMAL 10*6/UL
SEG NEUTROPHILS: 64 % (ref 36–65)
SEGMENTED NEUTROPHILS ABSOLUTE COUNT: 6.15 K/UL (ref 1.5–8.1)
SODIUM BLD-SCNC: 137 MMOL/L (ref 135–144)
VITAMIN D 25-HYDROXY: 28.2 NG/ML (ref 30–100)
WBC # BLD: 9.6 K/UL (ref 3.5–11.3)
WBC # BLD: ABNORMAL 10*3/UL

## 2020-07-26 PROCEDURE — 73706 CT ANGIO LWR EXTR W/O&W/DYE: CPT

## 2020-07-26 PROCEDURE — 73721 MRI JNT OF LWR EXTRE W/O DYE: CPT

## 2020-07-26 PROCEDURE — 82306 VITAMIN D 25 HYDROXY: CPT

## 2020-07-26 PROCEDURE — 6360000002 HC RX W HCPCS: Performed by: STUDENT IN AN ORGANIZED HEALTH CARE EDUCATION/TRAINING PROGRAM

## 2020-07-26 PROCEDURE — 1200000000 HC SEMI PRIVATE

## 2020-07-26 PROCEDURE — 6370000000 HC RX 637 (ALT 250 FOR IP): Performed by: STUDENT IN AN ORGANIZED HEALTH CARE EDUCATION/TRAINING PROGRAM

## 2020-07-26 PROCEDURE — 6360000004 HC RX CONTRAST MEDICATION: Performed by: NURSE PRACTITIONER

## 2020-07-26 PROCEDURE — 80048 BASIC METABOLIC PNL TOTAL CA: CPT

## 2020-07-26 PROCEDURE — 85025 COMPLETE CBC W/AUTO DIFF WBC: CPT

## 2020-07-26 PROCEDURE — 2580000003 HC RX 258: Performed by: STUDENT IN AN ORGANIZED HEALTH CARE EDUCATION/TRAINING PROGRAM

## 2020-07-26 RX ORDER — MORPHINE SULFATE 4 MG/ML
4 INJECTION, SOLUTION INTRAMUSCULAR; INTRAVENOUS ONCE
Status: COMPLETED | OUTPATIENT
Start: 2020-07-26 | End: 2020-07-26

## 2020-07-26 RX ADMIN — OXYCODONE HYDROCHLORIDE 5 MG: 5 TABLET ORAL at 15:06

## 2020-07-26 RX ADMIN — ENOXAPARIN SODIUM 30 MG: 30 INJECTION SUBCUTANEOUS at 21:50

## 2020-07-26 RX ADMIN — OXYCODONE HYDROCHLORIDE 5 MG: 5 TABLET ORAL at 11:15

## 2020-07-26 RX ADMIN — OXYCODONE HYDROCHLORIDE 5 MG: 5 TABLET ORAL at 19:57

## 2020-07-26 RX ADMIN — SODIUM CHLORIDE, PRESERVATIVE FREE 10 ML: 5 INJECTION INTRAVENOUS at 09:00

## 2020-07-26 RX ADMIN — IOHEXOL 110 ML: 350 INJECTION, SOLUTION INTRAVENOUS at 13:50

## 2020-07-26 RX ADMIN — ENOXAPARIN SODIUM 30 MG: 30 INJECTION SUBCUTANEOUS at 14:13

## 2020-07-26 RX ADMIN — ACETAMINOPHEN 1000 MG: 500 TABLET ORAL at 21:51

## 2020-07-26 RX ADMIN — MORPHINE SULFATE 4 MG: 4 INJECTION INTRAVENOUS at 01:37

## 2020-07-26 RX ADMIN — SODIUM CHLORIDE, PRESERVATIVE FREE 10 ML: 5 INJECTION INTRAVENOUS at 21:51

## 2020-07-26 RX ADMIN — ACETAMINOPHEN 1000 MG: 500 TABLET ORAL at 14:16

## 2020-07-26 RX ADMIN — OXYCODONE HYDROCHLORIDE 5 MG: 5 TABLET ORAL at 07:24

## 2020-07-26 RX ADMIN — OXYCODONE HYDROCHLORIDE 5 MG: 5 TABLET ORAL at 03:45

## 2020-07-26 SDOH — HEALTH STABILITY: MENTAL HEALTH: HOW OFTEN DO YOU HAVE A DRINK CONTAINING ALCOHOL?: 2-3 TIMES A WEEK

## 2020-07-26 ASSESSMENT — PAIN SCALES - GENERAL
PAINLEVEL_OUTOF10: 8
PAINLEVEL_OUTOF10: 7
PAINLEVEL_OUTOF10: 6
PAINLEVEL_OUTOF10: 8
PAINLEVEL_OUTOF10: 7
PAINLEVEL_OUTOF10: 8
PAINLEVEL_OUTOF10: 8

## 2020-07-26 NOTE — PROGRESS NOTES
Speech Language Pathology  [facility] [address]  Speech Language Pathology    COGNITIVE ASSESSMENT    NO LOC or CHI- ST TO DEFER AT THIS TIME      Date: 7/26/2020  Patient Name: Denzel Interiano  YOB: 1980   AGE: 44 y.o. MRN: [id number]        PT NOT SEEN FOR COGNITIVE ASSESSMENT AS NO LOC OR CHI IS DOCUMENTED. ST TO DEFER AT THIS TIME.        Genesis Escamilla M.S. [id number] [facility]    7/26/2020  8:17 AM
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
## 2020-07-26 NOTE — PROGRESS NOTES
Trauma Tertiary Survey    Admit Date: 7/25/2020  Hospital day 1    [facility]     No past medical history on file. Scheduled Meds:   sodium chloride flush  10 mL Intravenous 2 times per day    acetaminophen  1,000 mg Oral 3 times per day    enoxaparin  30 mg Subcutaneous BID     Continuous Infusions:  PRN Meds:sodium chloride flush, polyethylene glycol, promethazine **OR** ondansetron, oxyCODONE    Subjective:     Patient has complaints of bilateral lower leg pain. .  Pain is moderate, worsens with movement, and some relief by rest and pain medication. There is not associated numbness, tingling, weakness in the right lower extremity. Patient does report some  Weakness with Moving the left lower extremity especially with dorsiflexion. objective:   No data found. No intake/output data recorded. No intake/output data recorded. Radiology:  RT Knee Xray  Impression    Possible nondisplaced tibial plateau fracture      Lt Knee MRI  Impression    1. Complex tear in the posterior horn of the medial meniscus extending to the    undersurface and body/posterior horn junction. 2. Complex tear in the anterior horn of the lateral meniscus. 3. Full-thickness ACL tear. 4. High-grade partial thickness/full-thickness tear of the PCL at the femoral    attachment. 5. Full-thickness tear of the popliteus tendon. 6. High-grade partial tear of the fibulocollateral ligament.     7. MCL sprain.           CTA LLE   Impression    No acute arterial abnormality in the left lower extremity.                 PHYSICAL EXAM:   GCS:  4 - Opens eyes on own   6 - Follows simple motor commands  5 - Alert and oriented    Pupil size:  Left 5 mm Right 5 mm  Pupil reaction: Yes  Wiggles fingers: Left Yes Right Yes  Hand grasp:   Left normal   Right normal,  Wiggles toes: Left Yes    Right Yes  Plantar flexion: Left normal  Right normal    General Appearance: AOx3, well-developed, well-nourished, no acute distress  Skin: warm, dry, no rash, no erythema, no contusions  Neurologic: CN II-XII grossly intact, no focal senory or motor deficits, moves all extremities spontaneously, speech normal  Head: normocephalic, atraumatic, no Pineda's sign, no Raccoon eyes  Eyes: pupils equal, round, and reactive to light; EOMI; conjunctivae normal  ENT: tympanic membrane, external ear, and canal normal bilaterally; nose without deformities  Neck: supple, non-tender without mass, no crepitus, trachea midline  Pulmonary: clear to auscultation bilaterally; equal air entry bilaterally; no wheezes, rales, or rhonchi; no acute respiratory distress  Cardiovascular: regular rate and rhythm; no murmurs, rubs, or clicks  Chest: non-tender to palpation bilaterally, no crepitus bilaterally  Abdomen: soft, non-tender, non-distended, normal bowel sounds, no contusions, no seatbelt sign  Pelvis: non-tender, stable  Rectal: normal tone, no gross blood  Extremities: no cyanosis; no edema; no joint swelling, deformities, or tenderness; radial, femoral, and DP pulses intact bilaterally  Musculoskeletal: normal range of motion, strength 5/5 upper extremities bilaterally and in the right lower extremity. Patient does have some weakness with left foot dorsiflexion.      Spine:   Spine Tenderness ROM   Cervical 0 /10 Normal   Thoracic 0 /10 Normal   Lumbar 0 /10 Normal     Musculoskeletal:  Joint Tenderness Swelling ROM   Right shoulder absent absent normal   Left shoulder absent absent normal   Right elbow absent absent normal   Left elbow absent absent normal   Right wrist absent absent normal   Left wrist absent absent normal   Right hand grasp absent absent normal   Left hand grasp absent absent normal   Right hip absent absent normal   Left hip absent absent normal   Right knee absent absent normal   Left knee absent absent normal   Right ankle absent absent normal   Left ankle absent absent normal   Right foot absent absent normal   Left foot absent absent normal     CONSULTS: Ortho    PROCEDURES: None    INJURIES:      1. Right tibial plateau fracture  2. Ligamentous knee injury in the left knee as described above    Patient Active Problem List   Diagnosis    Trauma         Assessment/Plan:     1. No further injuries identified on tertiary survey  2.   No need for additional imaging at this time    Carli Shell DO  Trauma Resident  5:24 PM, 07/26/20

## 2020-07-26 NOTE — PROGRESS NOTES
Physical Therapy  DATE: 2020    NAME: Aletha Casas  MRN: [id number]   : 1980    Patient not seen this date for Physical Therapy due to:  [] Blood transfusion in progress  [] Hemodialysis  []  Patient Declined  [] Spine Precautions   [] Strict Bedrest  [] Surgery/ Procedure  [] Testing      [x] Other-awaiting braces for bilateral LE's per ortho mobility/weight bearing orders-[place]         [] PT being discontinued at this time. Patient independent. No further needs. [] PT being discontinued at this time as the patient has been transferred to palliative care. No further needs.     Rohit Howard, PT
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
## 2020-07-26 NOTE — ED NOTES
Patient laying quietly with eyes closed, arouses easily to speech. Patient denies any needs at this time has no s/s of distress at this time, will continue to monitor.       Sheran Lundborg, RN  07/26/20 0120

## 2020-07-26 NOTE — PLAN OF CARE
Problem: Falls - Risk of:  Goal: Will remain free from falls  Description: Will remain free from falls  Outcome: Ongoing  Goal: Absence of physical injury  Description: Absence of physical injury  Outcome: Ongoing     Problem: Infection:  Goal: Will remain free from infection  Description: Will remain free from infection  Outcome: Ongoing     Problem: Safety:  Goal: Free from accidental physical injury  Description: Free from accidental physical injury  Outcome: Ongoing  Goal: Free from intentional harm  Description: Free from intentional harm  Outcome: Ongoing     Problem: Daily Care:  Goal: Daily care needs are met  Description: Daily care needs are met  Outcome: Ongoing     Problem: Pain:  Goal: Patient's pain/discomfort is manageable  Description: Patient's pain/discomfort is manageable  Outcome: Ongoing  Goal: Pain level will decrease  Description: Pain level will decrease  Outcome: Ongoing  Goal: Control of acute pain  Description: Control of acute pain  Outcome: Ongoing  Goal: Control of chronic pain  Description: Control of chronic pain  Outcome: Ongoing     Problem: Skin Integrity:  Goal: Skin integrity will stabilize  Description: Skin integrity will stabilize  Outcome: Ongoing     Problem: Discharge Planning:  Goal: Patients continuum of care needs are met  Description: Patients continuum of care needs are met  Outcome: Ongoing
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
## 2020-07-26 NOTE — ED NOTES
Pt requesting update. Pt upset and in pain.  Pt notified of [address] [facility], [address] [place] [address]  07/26/20 3205

## 2020-07-26 NOTE — ED NOTES
Pt resting on cot in no acute distress. All safety measures met. RR even and non labored. Pt denies any needs at this time.       Alok Bautista RN  07/26/20 [id number]

## 2020-07-26 NOTE — ED PROVIDER NOTES
[facility] ED  Emergency Department  Emergency Medicine Resident Sign-out     Care of Suellen Marie was assumed from Dr. Telma Chamberlain and is being seen for No chief complaint on file. .  The patient's initial evaluation and plan have been discussed with the prior provider who initially evaluated the patient.      EMERGENCY DEPARTMENT COURSE / MEDICAL DECISION MAKING:       MEDICATIONS GIVEN:  Orders Placed This Encounter   Medications    iohexol (OMNIPAQUE 350) solution 130 mL    morphine injection 4 mg    ondansetron (ZOFRAN) injection 4 mg    DISCONTD: ceFAZolin (ANCEF) 2 g in dextrose 5 % 50 mL IVPB    morphine injection 4 mg    sodium chloride flush 0.9 % injection 10 mL    sodium chloride flush 0.9 % injection 10 mL    acetaminophen (TYLENOL) tablet 1,000 mg    polyethylene glycol (GLYCOLAX) packet 17 g    OR Linked Order Group     promethazine (PHENERGAN) tablet 12.5 mg     ondansetron (ZOFRAN) injection 4 mg    oxyCODONE (ROXICODONE) immediate release tablet 5 mg    enoxaparin (LOVENOX) injection 30 mg       LABS / RADIOLOGY:     Labs Reviewed   TRAUMA PANEL - Abnormal; Notable for the following components:       Result Value    Ethanol 97 (*)     Ethanol percent 0.097 (*)     RBC 3.86 (*)     Hemoglobin 12.3 (*)     Hematocrit 37.6 (*)     RDW 14.6 (*)     Glucose 103 (*)     Potassium 3.3 (*)     Chloride 111 (*)     CO2 19 (*)     pH, Ahmet 7.448 (*)     pCO2, Ahmet 34.5 (*)     pO2, Ahmet 71.4 (*)     HCO3, Venous 23.5 (*)     O2 Sat, Ahmet 95.4 (*)     All other components within normal limits   URINE DRUG SCREEN - Abnormal; Notable for the following components:    Cannabinoid Scrn, Ur POSITIVE (*)     All other components within normal limits   COVID-19   URINALYSIS   BASIC METABOLIC PANEL W/ REFLEX TO MG FOR LOW K   CBC WITH AUTO DIFFERENTIAL   TYPE AND SCREEN       Xr Knee Left (3 Views)    Result Date: 7/25/2020  EXAMINATION: THREE XRAY VIEWS OF THE LEFT KNEE 7/25/2020 8:19 pm COMPARISON: None. HISTORY: ORDERING SYSTEM PROVIDED HISTORY: [facility] TECHNOLOGIST PROVIDED HISTORY: [facility] FINDINGS: No evidence of acute fracture or dislocation. No focal osseous lesion. No evidence of joint effusion. No focal soft tissue abnormality. No acute abnormality of the knee. Xr Knee Right (3 Views)    Result Date: 7/25/2020  EXAMINATION: THREE XRAY VIEWS OF THE RIGHT KNEE 7/25/2020 8:19 pm COMPARISON: None. HISTORY: ORDERING SYSTEM PROVIDED HISTORY: [facility] TECHNOLOGIST PROVIDED HISTORY: [facility] FINDINGS: Fibula and femur intact. Alignment anatomic. Patellar normal.  No effusion. Possible nondisplaced tibial plateau fracture. Possible nondisplaced tibial plateau fracture     Ct Head Wo Contrast    Result Date: 7/25/2020  EXAMINATION: CT OF THE HEAD WITHOUT CONTRAST  7/25/2020 4:51 pm TECHNIQUE: CT of the head was performed without the administration of intravenous contrast. Dose modulation, iterative reconstruction, and/or weight based adjustment of the mA/kV was utilized to reduce the radiation dose to as low as reasonably achievable. COMPARISON: None. HISTORY: ORDERING SYSTEM PROVIDED HISTORY: Trauma TECHNOLOGIST PROVIDED HISTORY: Trauma Reason for Exam: [facility] FINDINGS: BRAIN/VENTRICLES: There is no acute intracranial hemorrhage, mass effect, or midline shift. There is satisfactory overall gray-white matter differentiation. The ventricular structures are symmetric and unremarkable. The infratentorial structures are unremarkable. ORBITS: The visualized portion of the orbits demonstrate no acute abnormality. SINUSES: The visualized paranasal sinuses and mastoid air cells demonstrate no acute abnormality. SOFT TISSUES/SKULL:  There is a left parietal hematoma with laceration. No acute intracranial abnormality. Left parietal hematoma with laceration.      Ct Cervical Spine Wo Contrast    Result Date: 7/25/2020  EXAMINATION: CT OF THE CERVICAL SPINE WITHOUT CONTRAST 7/25/2020 4:51 pm TECHNIQUE: CT of the cervical spine was performed without the administration of intravenous contrast. Multiplanar reformatted images are provided for review. Dose modulation, iterative reconstruction, and/or weight based adjustment of the mA/kV was utilized to reduce the radiation dose to as low as reasonably achievable. COMPARISON: None. HISTORY: ORDERING SYSTEM PROVIDED HISTORY: trauma TECHNOLOGIST PROVIDED HISTORY: trauma [place] [facility] [place]INDINGS: BONES/ALIGNMENT: There is no acute fracture or traumatic malalignment. DEGENERATIVE CHANGES: No significant degenerative changes. SOFT TISSUES: There is no prevertebral soft tissue swelling. No acute abnormality of the cervical spine. Ct Thoracic Spine Wo Contrast    Result Date: 7/25/2020  EXAMINATION: CT OF THE THORACIC SPINE WITHOUT CONTRAST  7/25/2020 4:52 pm: TECHNIQUE: CT of the thoracic spine was performed without the administration of intravenous contrast. Multiplanar reformatted images are provided for review. Dose modulation, iterative reconstruction, and/or weight based adjustment of the mA/kV was utilized to reduce the radiation dose to as low as reasonably achievable. COMPARISON: None. HISTORY: ORDERING SYSTEM PROVIDED HISTORY: [facility] TECHNOLOGIST PROVIDED HISTORY: [facility] [place] [facility] [place]INDINGS: BONES/ALIGNMENT: There is normal alignment of the spine sep mild scoliosis. . The vertebral body heights are maintained. No osseous destructive lesion is seen. DEGENERATIVE CHANGES: No gross spinal canal stenosis or bony neural foraminal narrowing of the thoracic spine. SOFT TISSUES: No paraspinal mass is seen. No fracture. Mild scoliosis. Ct Lumbar Spine Wo Contrast    Result Date: 7/25/2020  EXAMINATION: CT OF THE LUMBAR SPINE WITHOUT CONTRAST  7/25/2020 TECHNIQUE: CT of the lumbar spine was performed without the administration of intravenous contrast. Multiplanar reformatted images are provided for review.  Dose modulation, iterative reconstruction, and/or weight based adjustment of the mA/kV was utilized to reduce the radiation dose to as low as reasonably achievable. COMPARISON: None HISTORY: ORDERING SYSTEM PROVIDED HISTORY: trauma TECHNOLOGIST PROVIDED HISTORY: trauma FINDINGS: BONES/ALIGNMENT: There is normal alignment of the spine. The vertebral body heights are maintained. No osseous destructive lesion is seen. DEGENERATIVE CHANGES: No significant degenerative changes of the lumbar spine. SOFT TISSUES/RETROPERITONEUM: No paraspinal mass is seen. No evidence of an acute fracture or traumatic malalignment involving the lumbar spine     Xr Chest Portable    Result Date: 7/25/2020  EXAMINATION: ONE XRAY VIEW OF THE CHEST 7/25/2020 4:47 pm COMPARISON: None. HISTORY: ORDERING SYSTEM PROVIDED HISTORY: [facility] TECHNOLOGIST PROVIDED HISTORY: [facility] Initial exam FINDINGS: The lungs are without acute focal process. There is no effusion or pneumothorax. The cardiomediastinal silhouette is without acute process. The osseous structures are without acute process. No acute process. Ct Chest Abdomen Pelvis W Contrast    Result Date: 7/25/2020  EXAMINATION: CT OF THE CHEST, ABDOMEN, AND PELVIS WITH CONTRAST 7/25/2020 4:52 pm TECHNIQUE: CT of the chest, abdomen and pelvis was performed with the administration of intravenous contrast. Multiplanar reformatted images are provided for review. Dose modulation, iterative reconstruction, and/or weight based adjustment of the mA/kV was utilized to reduce the radiation dose to as low as reasonably achievable. COMPARISON: None HISTORY: ORDERING SYSTEM PROVIDED HISTORY: [facility] TECHNOLOGIST PROVIDED HISTORY: [facility] FINDINGS: Chest: Mediastinum:  Thoracic aorta and central portion of the pulmonary artery opacify normally. The ascending thoracic aorta is of normal caliber. Heart size is normal. There is no pericardial effusion. No mediastinal or hilar lymph nodes exceed the CT criteria for abnormal enlargement. Lungs/pleura: Clear.  Soft

## 2020-07-26 NOTE — ED NOTES
Pt reports a large amount of pain in bilateral legs. Both legs warm and dry.       Ej Duke RN  07/26/20 [id number]

## 2020-07-26 NOTE — CARE COORDINATION
Case Management Initial Discharge Plan  Francesca Baker,             Met with:patient to discuss discharge plans. Information verified: address, contacts, phone number, , insurance Yes    Emergency Contact/Next of Kin name & number:   Elmer Robles (Father) [phone]  Trace Thorpe (Mother) [phone]  PCP: Jacob Murguia, KYA - CNP  Date of last visit: past year     Insurance Provider: VICKIE    Discharge Planning    Living Arrangements:  Friends   Support Systems:  Friends/Neighbors    Home has 1 stories  3 steps to climb to get into front door,   Location of bedroom/bathroom in home main floor     Patient able to perform ADL's:Independent    Current Services (outpatient & in home) none  DME equipment: n/a   DME provider: n/a     Receiving oral anticoagulation therapy? No    If indicated:   Physician managing anticoagulation treatment: n/a   Where does patient obtain lab work for ATC treatment? N/a       Potential Assistance Needed:  N/A    Patient agreeable to home care: No  [employer] of choice provided:  n/a   Patient states that his room mate's sister is an RN and will ask her if he needs anything. He declined HC. Prior SNF/Rehab Placement and Facility: no   Agreeable to SNF/Rehab: No  [employer] of choice provided: n/a     Evaluation: n/a    Expected Discharge date:  20    Patient expects to be discharged to:  home  Follow Up Appointment: Best Day/ Time: Monday AM    Transportation provider: room mate   Transportation arrangements needed for discharge: Yes    Readmission Risk              Risk of Unplanned Readmission:        7             Does patient have a readmission risk score greater than 14?: No  If yes, follow-up appointment must be made within 7 days of discharge. Goals of Care: work with PT/OT         Discharge Plan: Plan is home with room mate. Patient declined HC. Follow PT recommendations for DME. Patient has leg braces.            Electronically signed by Ami Schmitz Pito Romero on 7/26/20 at 3:16 PM EDT

## 2020-07-27 PROCEDURE — 6370000000 HC RX 637 (ALT 250 FOR IP): Performed by: STUDENT IN AN ORGANIZED HEALTH CARE EDUCATION/TRAINING PROGRAM

## 2020-07-27 PROCEDURE — 1200000000 HC SEMI PRIVATE

## 2020-07-27 PROCEDURE — 97535 SELF CARE MNGMENT TRAINING: CPT

## 2020-07-27 PROCEDURE — 6360000002 HC RX W HCPCS: Performed by: STUDENT IN AN ORGANIZED HEALTH CARE EDUCATION/TRAINING PROGRAM

## 2020-07-27 PROCEDURE — 2580000003 HC RX 258: Performed by: STUDENT IN AN ORGANIZED HEALTH CARE EDUCATION/TRAINING PROGRAM

## 2020-07-27 PROCEDURE — 97530 THERAPEUTIC ACTIVITIES: CPT

## 2020-07-27 PROCEDURE — 97162 PT EVAL MOD COMPLEX 30 MIN: CPT

## 2020-07-27 PROCEDURE — 97166 OT EVAL MOD COMPLEX 45 MIN: CPT

## 2020-07-27 RX ORDER — POLYETHYLENE GLYCOL 3350 17 G/17G
17 POWDER, FOR SOLUTION ORAL DAILY
Status: DISCONTINUED | OUTPATIENT
Start: 2020-07-27 | End: 2020-07-29 | Stop reason: HOSPADM

## 2020-07-27 RX ADMIN — ACETAMINOPHEN 1000 MG: 500 TABLET ORAL at 21:25

## 2020-07-27 RX ADMIN — ENOXAPARIN SODIUM 30 MG: 30 INJECTION SUBCUTANEOUS at 20:29

## 2020-07-27 RX ADMIN — OXYCODONE HYDROCHLORIDE 5 MG: 5 TABLET ORAL at 13:20

## 2020-07-27 RX ADMIN — OXYCODONE HYDROCHLORIDE 5 MG: 5 TABLET ORAL at 00:11

## 2020-07-27 RX ADMIN — POLYETHYLENE GLYCOL 3350 17 G: 17 POWDER, FOR SOLUTION ORAL at 09:03

## 2020-07-27 RX ADMIN — OXYCODONE HYDROCHLORIDE 5 MG: 5 TABLET ORAL at 09:03

## 2020-07-27 RX ADMIN — OXYCODONE HYDROCHLORIDE 5 MG: 5 TABLET ORAL at 21:24

## 2020-07-27 RX ADMIN — OXYCODONE HYDROCHLORIDE 5 MG: 5 TABLET ORAL at 17:14

## 2020-07-27 RX ADMIN — ACETAMINOPHEN 1000 MG: 500 TABLET ORAL at 05:38

## 2020-07-27 RX ADMIN — ENOXAPARIN SODIUM 30 MG: 30 INJECTION SUBCUTANEOUS at 09:03

## 2020-07-27 RX ADMIN — SODIUM CHLORIDE, PRESERVATIVE FREE 10 ML: 5 INJECTION INTRAVENOUS at 20:29

## 2020-07-27 RX ADMIN — OXYCODONE HYDROCHLORIDE 5 MG: 5 TABLET ORAL at 04:44

## 2020-07-27 RX ADMIN — SODIUM CHLORIDE, PRESERVATIVE FREE 10 ML: 5 INJECTION INTRAVENOUS at 09:07

## 2020-07-27 ASSESSMENT — PAIN SCALES - GENERAL
PAINLEVEL_OUTOF10: 8
PAINLEVEL_OUTOF10: 7
PAINLEVEL_OUTOF10: 8
PAINLEVEL_OUTOF10: 7
PAINLEVEL_OUTOF10: 8
PAINLEVEL_OUTOF10: 7
PAINLEVEL_OUTOF10: 8
PAINLEVEL_OUTOF10: 7
PAINLEVEL_OUTOF10: 5
PAINLEVEL_OUTOF10: 7

## 2020-07-27 ASSESSMENT — PAIN DESCRIPTION - LOCATION
LOCATION: LEG
LOCATION: LEG

## 2020-07-27 ASSESSMENT — PAIN DESCRIPTION - ORIENTATION
ORIENTATION: LEFT
ORIENTATION: RIGHT;LEFT

## 2020-07-27 ASSESSMENT — PAIN DESCRIPTION - PAIN TYPE: TYPE: ACUTE PAIN

## 2020-07-27 NOTE — CARE COORDINATION
Met with pt to complete SBIRT. Pt admits to occasionally drinking, states that he typically only has 3 beers a week. Pt does have a history of heavier drinking and has attended [place] [id number] meetings in the past.  Pt denies past inpatient or outpatient alcohol treatment. Pt does not have any concerns with his current alcohol use and does not have any interest in treatment resources. Pt states he is aware of options if needed. Pt denies any current drug use. States he used marijuana in the past and his parents put him in rehab when in [employer]. Pt is a reece by [employer] but is currently unemployed. Pt plans to return home upon discharge and states his roommate will help with any needs. Case management following for discharge needs. Alcohol Screening and Brief Intervention        Recent Labs     07/25/20  1639   ALC 97*       Alcohol Pre-screening  (MEN ONLY) How many times in the past year have you had 5 or more drinks in a day?: None       Alcohol Screening Audit       Drug Pre-Screening   How many times in the past year have you used a recreational drug or used a prescription medication for nonmedical reasons?: None    Drug Screening DAST       Mood Pre-Screening (PHQ-2)  During the past two weeks, have you been bothered by little interest or pleasure in doing things?: No  During the past two weeks, have you been bothered by feeling down, depressed, or hopeless?: No    Mood Pre-Screening (PHQ-9)         I have interviewed Francesca Baker, [id number] regarding  His alcohol consumption/drug use and risk for excessive use. Screenings were negative. Patient  Declined intervention at this time.      Deferred []    Completed on: 7/27/2020   CAYETANO Bautista

## 2020-07-27 NOTE — PROGRESS NOTES
PROGRESS NOTE          PATIENT NAME: Denny Quiroga  MEDICAL RECORD NO. [id number]  DATE: 2020  SURGEON: Nadja Dominguez  PRIMARY CARE PHYSICIAN: Al Velarde, APRN - CNP    HD: # 2    ASSESSMENT    Patient Active Problem List   Diagnosis    Trauma       MEDICAL DECISION MAKING AND PLAN    1. Neuro  1. Pain control with Tylenol and Nicole  2. CV  1. Heart rate low 90s, BP is 119/75  3. Resp  1. SpO2 % on room air  4. GI/Diet  1. Tolerating general diet  2. Monitor for bowel movement, patient is passing flatus  5. Renal  1. No labs today, : Cr 0.72  2. Patient is making adequate urine, although this is a measure  6. Heme  1. No labs today,  hgb: 14.1 wbc: 9.6  7. Endo  1.     8. Dispo  1. PT/OT to see      Chief Complaint: \"My legs hurt\"    SUBJECTIVE    Denny Quiroga seen and examined. No acute events overnight. Patient is tolerating a general diet, he is urinating adequately although this is unmeasured. Patient has not had a bowel movement since being in the hospital, but reports he is passing flatus. He states his legs feel stiff, his pain is controlled with the Tylenol and Roxicodone, he states the knee braces help him. OBJECTIVE  VITALS: Temp: Temp: 98.1 °F (36.7 °C)Temp  Av.2 °F (36.8 °C)  Min: 97.9 °F (36.6 °C)  Max: 98.7 °F (92.3 °C) BP Systolic (53JSL), EJQ:046 , Min:119 , DMK:609   Diastolic (04DJL), ZVY:14, Min:75, Max:90   Pulse Pulse  Av  Min: 90  Max: 93 Resp Resp  Av.3  Min: 16  Max: 17 Pulse ox SpO2  Av.7 %  Min: 95 %  Max: 100 %  GENERAL: alert, no distress  NEURO: CN II-XII grossly intact. Patient has 5/5 strength in bilateral upper extremities, right lower extremity.   Patient is unable to dorsiflex left foot, he is able to wiggle his toes and has sensation equal and grossly intact in his bilateral lower extremities  HEENT: Normocephalic, atraumatic  LUNGS: clear to ausculation, without wheezes, rales or rhonci  HEART: normal rate and regular rhythm  ABDOMEN: soft, non-tender, non-distended, bowel sounds present in all 4 quadrants and no guarding or peritoneal signs present  EXTREMITY: no cyanosis, clubbing or edema    I/O last 3 completed shifts:  In: -   Out: 600 [Urine:600]    Drain/tube output:   In: -   Out: 600 [Urine:600]    LAB:  CBC:   Recent Labs     07/25/20  1639 07/26/20  0619   WBC 8.9 9.6   HGB 12.3* 14.1   HCT 37.6* 43.1   MCV 97.4 97.1    218     BMP:   Recent Labs     07/25/20  1639 07/26/20  0619    137   K 3.3* 4.0   * 102   CO2 19* 27   BUN 10 9   CREATININE 0.77 0.72   GLUCOSE 103* 100*     COAGS:   Recent Labs     07/25/20 1639   APTT 22.5   INR 1.1       RADIOLOGY:  No new studies      Riya Leroy DO  7/27/20, 6:43 AM

## 2020-07-27 NOTE — PLAN OF CARE
Problem: Falls - Risk of:  Goal: Will remain free from falls  Description: Will remain free from falls  7/27/2020 0542 by Reyes Mires, RN  Outcome: Ongoing     Problem: Infection:  Goal: Will remain free from infection  Description: Will remain free from infection  7/27/2020 0542 by Reyes Mires, RN  Outcome: Ongoing     Problem: Safety:  Goal: Free from accidental physical injury  Description: Free from accidental physical injury  7/27/2020 0542 by Reyes Mires, RN  Outcome: Ongoing

## 2020-07-27 NOTE — CARE COORDINATION
Patient insurance is inactive. Left VM for HELP. Patient will need SNF/Rehab. SNF list in the room. Needs OP surgery.  Hospital cash card faxed to [facility].

## 2020-07-27 NOTE — PLAN OF CARE
Problem: Falls - Risk of:  Goal: Will remain free from falls  Description: Will remain free from falls  7/27/2020 1613 by Ivone Laurent RN  Outcome: Ongoing  7/27/2020 0542 by Reba Be RN  Outcome: Ongoing  Goal: Absence of physical injury  Description: Absence of physical injury  Outcome: Ongoing     Problem: Infection:  Goal: Will remain free from infection  Description: Will remain free from infection  7/27/2020 1613 by Ivone Laurent RN  Outcome: Ongoing  7/27/2020 0542 by Reba Be RN  Outcome: Ongoing     Problem: Safety:  Goal: Free from accidental physical injury  Description: Free from accidental physical injury  7/27/2020 1613 by Ivone Laurent RN  Outcome: Ongoing  7/27/2020 0542 by Reba Be RN  Outcome: Ongoing  Goal: Free from intentional harm  Description: Free from intentional harm  Outcome: Ongoing     Problem: Daily Care:  Goal: Daily care needs are met  Description: Daily care needs are met  Outcome: Ongoing     Problem: Pain:  Goal: Patient's pain/discomfort is manageable  Description: Patient's pain/discomfort is manageable  Outcome: Ongoing  Goal: Pain level will decrease  Description: Pain level will decrease  Outcome: Ongoing  Goal: Control of acute pain  Description: Control of acute pain  Outcome: Ongoing  Goal: Control of chronic pain  Description: Control of chronic pain  Outcome: Ongoing     Problem: Skin Integrity:  Goal: Skin integrity will stabilize  Description: Skin integrity will stabilize  Outcome: Ongoing     Problem: Discharge Planning:  Goal: Patients continuum of care needs are met  Description: Patients continuum of care needs are met  Outcome: Ongoing     Problem: Musculor/Skeletal Functional Status  Goal: Highest potential functional level  Outcome: Ongoing
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
## 2020-07-27 NOTE — PROGRESS NOTES
Occupational Therapy   Occupational Therapy Initial Assessment  Date: 2020   Patient Name: Lorenzo Carrera  MRN: [id number]     : 1980    Date of Service: 2020    Discharge Recommendations:  Patient would benefit from continued therapy after discharge  OT Equipment Recommendations  Equipment Needed: Yes  Mobility Devices: ADL Assistive Devices; Antonietta Tong: Rolling  ADL Assistive Devices: Shower Chair with back    Assessment   Performance deficits / Impairments: Decreased functional mobility ; Decreased safe awareness;Decreased balance;Decreased ADL status; Decreased endurance;Decreased high-level IADLs;Decreased sensation  Assessment: Pt required CGA for functional mobility and Mod A for LB dressing this date, limited by pain. Pt required VCs throughout d/t inconsistent adherence to RLE NWB. Pt mildly impulsive at times with decreased safety awareness. Pt will require continued OT services during acute hospitilization stay to maximize safety and increase independence in ADL/functional mobility tasks. Pt would benefit from continued therapy upon discharge. Prognosis: Good  Decision Making: Medium Complexity  OT Education: OT Role;Plan of Care;Energy Conservation; ADL Adaptive Strategies;Precautions;Transfer Training  Patient Education: Educated on importance of adhering to NWB status, safety awareness, proper use of RW  REQUIRES OT FOLLOW UP: Yes  Activity Tolerance  Activity Tolerance: Patient limited by pain  Safety Devices  Safety Devices in place: Yes  Type of devices: Gait belt;Left in chair;Patient at risk for falls;Call light within reach;Nurse notified  Restraints  Initially in place: No           Patient Diagnosis(es): The encounter diagnosis was Closed fracture of right tibial plateau, initial encounter. has no past medical history on file. has no past surgical history on file.            Restrictions  Restrictions/Precautions  Restrictions/Precautions: Weight Bearing  Required Braces or Orthoses?: Yes  Lower Extremity Weight Bearing Restrictions  Right Lower Extremity Weight Bearing: Non Weight Bearing  Position Activity Restriction  Other position/activity restrictions: bed rest with bedside commode, NWB RLE, Pt required to wear BLE hinge braces and L PRAFO    Subjective   General  Chart Reviewed: Yes, Progress Notes, History and Physical, Imaging, Labs, Orders  Patient assessed for rehabilitation services?: Yes  Family / Caregiver Present: No  General Comment  Comments: Rn ok'd for therapy visist this date.  Pt agreeable to session, pleasent/cooperative throughout  Patient Currently in Pain: Yes  Pain Assessment  Pain Assessment: 0-10  Pain Level: 8  Pain Type: Acute pain  Pain Location: Leg  Pain Orientation: Right;Left  Vital Signs  Patient Currently in Pain: Yes     Social/Functional History  Social/Functional History  Lives With: Friend(s)(roomate)  Type of Home: House  Home Layout: One level  Home Access: Stairs to enter with rails  Entrance Stairs - Number of Steps: 3  Entrance Stairs - Rails: Both  Bathroom Shower/Tub: Tub/Shower unit  Bathroom Toilet: Standard  Bathroom Equipment: (no equiptment)  Bathroom Accessibility: Accessible  Home Equipment: Crutches  ADL Assistance: Independent  Homemaking Assistance: Independent  Homemaking Responsibilities: Yes  Ambulation Assistance: Independent  Transfer Assistance: Independent  Active : Yes  Mode of Transportation: Truck  Occupation: (Pt reports unemployed due to [place] [employer] pandemic)  Leisure & Hobbies: ride motorcycle       Objective   Vision: Within Functional Limits  Hearing: Within functional limits    Orientation  Overall Orientation Status: Within Normal Limits     Balance  Sitting Balance: Stand by assistance(sitting EOB, sitting in wheelchair)  Standing Balance: Contact guard assistance  Standing Balance  Time: ~3 minutes  Activity: functional mobility from bed to wheelchair  Comment: Pt with good adherence during static standing balance for NWB precautions; however, with increased movement pt needed constant reminder of NWB RLE precautions. Functional Mobility  Functional - Mobility Device: Rolling Walker  Activity: (around hosptial room)  Assist Level: Contact guard assistance  Functional Mobility Comments: Functional mobility from bed to wheelchair ~3 feet. Pt impulsive and desiring to be independent,  required VCs for inconsistent adherence to RLE NWB status. Pt unsteady, no LOB. ADL  Feeding: Independent  Grooming: Contact guard assistance; Increased time to complete;Setup  UE Bathing: Setup; Increased time to complete;Stand by assistance  LE Bathing: Moderate assistance; Increased time to complete;Setup  UE Dressing: Stand by assistance; Increased time to complete  LE Dressing: Moderate assistance; Increased time to complete;Setup(Min-Mod A to don hip hinge braces on BLE and PRAFO on LLE. Pt with good understanding of how to don braces. Assistance with boot due to increased pain with bending. Donned lying in bed supine, HOB raised up.)  Toileting: Moderate assistance;Setup; Increased time to complete  Additional Comments: Pt completed LB donning of braces requiring Min-Mod A. Pt with good understanding of braces. Pt impulsive at times and requires VCs to adhere to NWB status.      Coordination  Movements Are Fluid And Coordinated: Yes     Bed mobility  Supine to Sit: Modified independent  Sit to Supine: Modified independent  Scooting: Independent  Comment: Use of hand rails, bed slightly elevated     Transfers  Sit to stand: Contact guard assistance(Pt unsteady, required Max VCs for proper transfer techniques and to adhere to NWB status)  Stand to sit: Contact guard assistance(Pt reports increased pain and facial expressions indicating pain when completing stand to sit transfer to wheelchair)  Transfer Comments: Pt required increased time/effort for completing functional transfer this date, with noticiable increased discomfort with mobility. Pt with inconsistence adherence to NWB requring max VCs throughout to  RLE to ensure no weight is put on it. Pt impulive with impulsive movements requiring VCs for safety awareness and transfer techniques.      Cognition  Overall Cognitive Status: WFL        Sensation  Overall Sensation Status: Impaired(LLE numbness and a little tingling)        LUE AROM (degrees)  LUE AROM : WFL  Left Hand AROM (degrees)  Left Hand AROM: WFL  RUE AROM (degrees)  RUE AROM : WFL  Right Hand AROM (degrees)  Right Hand AROM: WFL  LUE Strength  Gross LUE Strength: WFL  L Hand General: 5/5  LUE Strength Comment: Grossly 5/5  RUE Strength  Gross RUE Strength: WFL  R Hand General: 5/5  RUE Strength Comment: Grossly 5/5       Plan   Plan  Times per week: 4-5x/week  Specific instructions for Next Treatment: Education and practice adhering to NWB status, transfer training, LB ADLs  Current Treatment Recommendations: Safety Education & Training, Balance Training, Patient/Caregiver Education & Training, Self-Care / ADL, Functional Mobility Training, Endurance Training      AM-PAC Score        -[facility] Daily Activity Raw Score: 16 (07/27/20 1105)  AM-PAC Inpatient ADL T-Scale Score : 35.96 (07/27/20 1105)  ADL Inpatient CMS 0-100% Score: 53.32 (07/27/20 1105)  ADL Inpatient CMS G-Code Modifier : CK (07/27/20 1105)    Goals  Short term goals  Time Frame for Short term goals: By discharge:  Short term goal 1: Pt will complete functional transfers/functional mobility w/ MOD I, use of AD PRN  Short term goal 2: Pt will complete ADLs with MOD I, use of AD PRN  Short term goal 3: Pt will don/doff all braces with MOD I  Short term goal 4: Pt will demo adherence to NWB status of RLE during all functional mobility/ADL tasks  Short term goal 5: Pt will demo good safety awareness during all functional mobility/ADL tasks       Therapy Time   Individual Concurrent Group Co-treatment   Time In 0805         Time Out 0830         Minutes 25            Variance: [address], OTR/L

## 2020-07-27 NOTE — PROGRESS NOTES
Physical Therapy    Facility/Department: [address] ORTHO/MED SURG  Initial Assessment    NAME: Gloria Power  : 1980  MRN: [id number]    Date of Service: 2020    Discharge Recommendations:  Patient would benefit from continued therapy after discharge   Assessment   Body structures, Functions, Activity limitations: Decreased functional mobility ; Decreased strength;Decreased endurance  Assessment: The pt transferred bed to chair x CGA with NWB R LE , TTWB L LE. The pt is inconsistent with maintaining NWB R LE.  will continue with transfers  Prognosis: Good  Decision Making: Medium Complexity  PT Education: Goals;PT Role;Plan of Care  REQUIRES PT FOLLOW UP: Yes  Activity Tolerance  Activity Tolerance: Patient limited by fatigue   Patient Diagnosis(es): The encounter diagnosis was Closed fracture of right tibial plateau, initial encounter. has no past medical history on file. has no past surgical history on file.   Restrictions  Restrictions/Precautions  Restrictions/Precautions: Weight Bearing  Required Braces or Orthoses?: Yes  Lower Extremity Weight Bearing Restrictions  Right Lower Extremity Weight Bearing: Non Weight Bearing  Left Lower Extremity Weight Bearing: Toe Touch Weight Bearing  Required Braces or Orthoses  Right Lower Extremity Brace: Knee Brace  Left Lower Extremity Brace: Knee Brace(boot L LE)  Position Activity Restriction  Other position/activity restrictions: bed rest with bedside commode, NWB RLE, Pt required to wear BLE hinge braces and L PRAFO  Vision/Hearing  Vision: Within Functional Limits  Hearing: Within functional limits     Subjective  General  Patient assessed for rehabilitation services?: Yes  Response To Previous Treatment: Not applicable  Follows Commands: Within Functional Limits  Subjective  Subjective: RN and pt agreeable to therapy  Pain Screening  Patient Currently in Pain: Yes  Pain Assessment  Pain Assessment: 0-10  Pain Level: 8  Pain Location: Leg  Pain Orientation: Left  Vital Signs  Patient Currently in Pain: Yes     Orientation  Orientation  Overall Orientation Status: Within Normal Limits  Social/Functional History  Social/Functional History  Lives With: Friend(s)(roomate)  Type of Home: House  Home Layout: One level  Home Access: Stairs to enter with rails  Entrance Stairs - Number of Steps: 3  Entrance Stairs - Rails: Both  Bathroom Shower/Tub: Tub/Shower unit  Bathroom Toilet: Standard  Bathroom Equipment: (no equiptment)  Bathroom Accessibility: Accessible  Home Equipment: Crutches  ADL Assistance: Independent  Homemaking Assistance: Independent  Homemaking Responsibilities: Yes  Ambulation Assistance: Independent  Transfer Assistance: Independent  Active : Yes  Mode of Transportation: Truck  Occupation: Unemployed  Leisure & Hobbies: ride your motorcycle  Cognition      Objective     AROM RLE (degrees)  RLE AROM: WFL  AROM LLE (degrees)  LLE AROM : WFL  AROM RUE (degrees)  RUE AROM : WNL  AROM LUE (degrees)  LUE AROM : WNL  Strength RLE  Strength RLE: (N/T)  Strength LLE  Strength LLE: (N/T)  Strength RUE  Strength RUE: WNL  Strength LUE  Strength LUE: WNL     Sensation  Overall Sensation Status: Impaired  Bed mobility  Supine to Sit: Contact guard assistance  Sit to Supine: Contact guard assistance  Scooting: Contact guard assistance  Transfers  Sit to Stand: Contact guard assistance  Stand to sit: Contact guard assistance  Ambulation  Ambulation?: No  Ambulation 1  Surface: level tile  Assistance: Contact guard assistance  Distance: Bed to chair with CGA     Balance  Posture: Good  Sitting - Static: Good  Sitting - Dynamic: Poor  Standing - Static: Fair  Standing - Dynamic: [address]  Times per week: 5-6x wk  Current Treatment Recommendations: Strengthening, Functional Mobility Training, Transfer Training, Safety Education & Training, Endurance Training, Pain Management  Safety Devices  Type of devices: Call light within reach, Left in

## 2020-07-28 PROCEDURE — 97110 THERAPEUTIC EXERCISES: CPT

## 2020-07-28 PROCEDURE — 6360000002 HC RX W HCPCS: Performed by: STUDENT IN AN ORGANIZED HEALTH CARE EDUCATION/TRAINING PROGRAM

## 2020-07-28 PROCEDURE — 6370000000 HC RX 637 (ALT 250 FOR IP): Performed by: STUDENT IN AN ORGANIZED HEALTH CARE EDUCATION/TRAINING PROGRAM

## 2020-07-28 PROCEDURE — 1200000000 HC SEMI PRIVATE

## 2020-07-28 PROCEDURE — 97530 THERAPEUTIC ACTIVITIES: CPT

## 2020-07-28 PROCEDURE — 2580000003 HC RX 258: Performed by: STUDENT IN AN ORGANIZED HEALTH CARE EDUCATION/TRAINING PROGRAM

## 2020-07-28 RX ORDER — SENNA PLUS 8.6 MG/1
1 TABLET ORAL DAILY
Status: DISCONTINUED | OUTPATIENT
Start: 2020-07-28 | End: 2020-07-29 | Stop reason: HOSPADM

## 2020-07-28 RX ADMIN — ACETAMINOPHEN 1000 MG: 500 TABLET ORAL at 05:41

## 2020-07-28 RX ADMIN — OXYCODONE HYDROCHLORIDE 5 MG: 5 TABLET ORAL at 17:37

## 2020-07-28 RX ADMIN — SODIUM CHLORIDE, PRESERVATIVE FREE 10 ML: 5 INJECTION INTRAVENOUS at 21:00

## 2020-07-28 RX ADMIN — OXYCODONE HYDROCHLORIDE 5 MG: 5 TABLET ORAL at 09:32

## 2020-07-28 RX ADMIN — SENNOSIDES 8.6 MG: 8.6 TABLET, FILM COATED ORAL at 09:32

## 2020-07-28 RX ADMIN — SODIUM CHLORIDE, PRESERVATIVE FREE 10 ML: 5 INJECTION INTRAVENOUS at 09:32

## 2020-07-28 RX ADMIN — ACETAMINOPHEN 1000 MG: 500 TABLET ORAL at 21:42

## 2020-07-28 RX ADMIN — ENOXAPARIN SODIUM 30 MG: 30 INJECTION SUBCUTANEOUS at 21:41

## 2020-07-28 RX ADMIN — ENOXAPARIN SODIUM 30 MG: 30 INJECTION SUBCUTANEOUS at 09:31

## 2020-07-28 RX ADMIN — OXYCODONE HYDROCHLORIDE 5 MG: 5 TABLET ORAL at 13:27

## 2020-07-28 RX ADMIN — OXYCODONE HYDROCHLORIDE 5 MG: 5 TABLET ORAL at 21:41

## 2020-07-28 RX ADMIN — POLYETHYLENE GLYCOL 3350 17 G: 17 POWDER, FOR SOLUTION ORAL at 09:32

## 2020-07-28 RX ADMIN — OXYCODONE HYDROCHLORIDE 5 MG: 5 TABLET ORAL at 05:39

## 2020-07-28 RX ADMIN — ACETAMINOPHEN 1000 MG: 500 TABLET ORAL at 13:27

## 2020-07-28 ASSESSMENT — PAIN DESCRIPTION - ONSET: ONSET: ON-GOING

## 2020-07-28 ASSESSMENT — PAIN DESCRIPTION - FREQUENCY: FREQUENCY: CONTINUOUS

## 2020-07-28 ASSESSMENT — PAIN SCALES - GENERAL
PAINLEVEL_OUTOF10: 6
PAINLEVEL_OUTOF10: 7
PAINLEVEL_OUTOF10: 2
PAINLEVEL_OUTOF10: 2
PAINLEVEL_OUTOF10: 8
PAINLEVEL_OUTOF10: 6
PAINLEVEL_OUTOF10: 8
PAINLEVEL_OUTOF10: 6
PAINLEVEL_OUTOF10: 6

## 2020-07-28 ASSESSMENT — PAIN DESCRIPTION - ORIENTATION
ORIENTATION: LEFT
ORIENTATION: LEFT;RIGHT

## 2020-07-28 ASSESSMENT — PAIN DESCRIPTION - LOCATION
LOCATION: LEG
LOCATION: LEG

## 2020-07-28 ASSESSMENT — PAIN DESCRIPTION - PAIN TYPE: TYPE: ACUTE PAIN

## 2020-07-28 ASSESSMENT — PAIN DESCRIPTION - DESCRIPTORS: DESCRIPTORS: CONSTANT;DISCOMFORT;ACHING

## 2020-07-28 ASSESSMENT — PAIN - FUNCTIONAL ASSESSMENT: PAIN_FUNCTIONAL_ASSESSMENT: PREVENTS OR INTERFERES SOME ACTIVE ACTIVITIES AND ADLS

## 2020-07-28 NOTE — PROGRESS NOTES
PROGRESS NOTE          PATIENT NAME: Suzanna Chris  MEDICAL RECORD NO. [id number]  DATE: 2020  SURGEON: Bridget Anderson  PRIMARY CARE PHYSICIAN: Christelle Mock, APRN - CNP    HD: # 3    ASSESSMENT    Patient Active Problem List   Diagnosis    Trauma       MEDICAL DECISION MAKING AND PLAN    1. Neuro  1. Pain control with Tylenol and Nicole  2. CV  1. Heart rate low 90s, BP is 119/75  3. Resp  1. SpO2 % on room air  4. GI/Diet  1. Tolerating general diet  2. Monitor for bowel movement, patient is passing flatus  3. Added sennakot daily  5. Renal  1. No labs today, : Cr 0.72  2. UO: 1.39 mL/kg/hr  6. Heme  1. No labs today,  hgb: 14.1 wbc: 9.6  2. Afebrile  7. Endo  1.     8. Dispo  1. PMR recommends SNF  2. Patient is working with PT/OT    Chief Complaint: \"My legs hurt\"    SUBJECTIVE    Suzanna Chris seen and examined. Patient reports no acute events overnight. Is tolerating a general diet well. Is passing adequate urine, he has not had a bowel movement but is passing flatus. He worked with PT/OT yesterday and said that made his legs a little sore. After PT he did notice some bruising present on the back of his legs bilaterally, he has been icing these areas and states that helps with the pain. Patient is resting comfortably this morning with no turns or complaints. Vitals stable, patient afebrile      OBJECTIVE  VITALS: Temp: Temp: 98.1 °F (36.7 °C)Temp  Av.3 °F (36.8 °C)  Min: 98.1 °F (36.7 °C)  Max: 98.4 °F (79.8 °C) BP Systolic (26SNV), ARIS:823 , Min:121 , PE   Diastolic (05FPK), CRE:55, Min:66, Max:81   Pulse Pulse  Av  Min: 65  Max: 70 Resp Resp  Av  Min: 16  Max: 16 Pulse ox SpO2  Av %  Min: 98 %  Max: 98 %  GENERAL: alert, no distress  NEURO: CN II-XII grossly intact. Patient has 5/5 strength in bilateral upper extremities, right lower extremity.   Patient is unable to dorsiflex left foot, he is able to wiggle his toes and has sensation equal and grossly intact in his bilateral lower extremities  HEENT: Normocephalic, atraumatic  LUNGS: clear to ausculation, without wheezes, rales or rhonci  HEART: normal rate and regular rhythm  ABDOMEN: soft, non-tender, non-distended, bowel sounds present in all 4 quadrants and no guarding or peritoneal signs present  EXTREMITY: no cyanosis, clubbing or edema, patient has bruising present bilaterally popliteal inferiorly to his calves he states he noticed after PT     I/O last 3 completed shifts:  In: -   Out: 875 [Urine:875]    Drain/tube output:  In: 250 [P.O.:240;  I.V.:10]  Out: 1875 [Urine:1875]    LAB:  CBC:   Recent Labs     07/25/20  1639 07/26/20  0619   WBC 8.9 9.6   HGB 12.3* 14.1   HCT 37.6* 43.1   MCV 97.4 97.1    218     BMP:   Recent Labs     07/25/20  1639 07/26/20  0619    137   K 3.3* 4.0   * 102   CO2 19* 27   BUN 10 9   CREATININE 0.77 0.72   GLUCOSE 103* 100*     COAGS:   Recent Labs     07/25/20  1639   APTT 22.5   INR 1.1       RADIOLOGY:  No new studies      Amalia Ram DO  7/28/20, 6:56 AM

## 2020-07-28 NOTE — CARE COORDINATION
Transitional Planning  Spoke with pt about discharge. He has 3 steps into his place and unable to place ramp for wheelchair. He states he could go stay with his girlfriend if needed. Her place is ground level apt with just a lip threshold to get into her apt. He is going to check with his grandmother about borrowing her w/c. Informed him he would need to make sure the sides come off chair for him to be able to use slide board. Sowmya to check on the availability of slider board and w/c with arms they come off. Benson [facility] from [employer] needs measurement of pt sitting widest width  He also has message to call Cele Mult[employer] with help to see if he qualifies for medicaid. He is currently on a layoff and does not have insurance. Cele Multani to fax paper work here to [phone].      12:10  Sowmya left message for Benson Finely with 18 inch measurement for w/c with side arms that remove and leg lift for elevation. Await call back    17:20  Spoke with pt he will be able to go to his girlfriend apt at discharge she is all on one level. His Dad has already picked up his wheelchair.   He is currently RLE NWB and LLE toe touch WB  He states his girlfriend will be able to help him  Alberto Ross did not have a sliding board  Informed pt slider boards can be found online $19 on up

## 2020-07-28 NOTE — PROGRESS NOTES
Yes  Pain Assessment  Pain Assessment: 0-10  Pain Level: 6  Pain Location: Leg  Pain Orientation: Left  Vital Signs  Patient Currently in Pain: Yes            Cognition   Cognition  Overall Cognitive Status: WFL  Objective      Transfers  Bed to Chair: Minimal assistance  Comment: Transfer WC to bed with sliding board with assist to remove arm rest.  After demonstration and education on NWB and TTWB in LEs, he was able to do a sliding board transfer wheelchair to bed and bed to wheelchair with difficulty but with only cues and assist with equipment. Balance  Sitting - Static: Good  Sitting - Dynamic: Good  Standing - Static: (Standing not done due to being NWB on one leg and TTWB on other)  Exercises  Comments: Ankle pumps left LE, LAQ in hinged brace x15 reps.                Goals  Short term goals  Time Frame for Short term goals: 10 visits  Short term goal 1: bed to chair x SBA with TTWB L LE, NWB R LE  Short term goal 2: to be independent with bed mobility  Short term goal 3: exercise program x SBA  Patient Goals   Patient goals : Return home    Plan    Plan  Times per week: 5-6x wk  Current Treatment Recommendations: Strengthening, Functional Mobility Training, Transfer Training, Safety Education & Training, Endurance Training, Pain Management  Safety Devices  Type of devices: Call light within reach, Left in chair, Nurse notified, Gait belt     Therapy Time   Individual Concurrent Group Co-treatment   Time In 1030         Time Out 1100         Minutes 30         Timed Code Treatment Minutes: [facility], PT

## 2020-07-29 VITALS
BODY MASS INDEX: 27.09 KG/M2 | HEART RATE: 70 BPM | WEIGHT: 200 LBS | SYSTOLIC BLOOD PRESSURE: 135 MMHG | RESPIRATION RATE: 18 BRPM | HEIGHT: 72 IN | TEMPERATURE: 98.4 F | DIASTOLIC BLOOD PRESSURE: 80 MMHG | OXYGEN SATURATION: 96 %

## 2020-07-29 PROBLEM — S83.522A RUPTURE OF POSTERIOR CRUCIATE LIGAMENT OF LEFT KNEE: Status: ACTIVE | Noted: 2020-07-29
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
## 2020-07-29 PROBLEM — S83.412A MCL SPRAIN OF LEFT KNEE: Status: ACTIVE | Noted: 2020-07-29

## 2020-07-29 PROBLEM — S83.242A ACUTE MEDIAL MENISCUS TEAR OF LEFT KNEE: Status: ACTIVE | Noted: 2020-07-29

## 2020-07-29 PROBLEM — S83.512A RUPTURE OF ANTERIOR CRUCIATE LIGAMENT OF LEFT KNEE: Status: ACTIVE | Noted: 2020-07-29

## 2020-07-29 PROBLEM — S83.282A TEAR OF LATERAL MENISCUS OF LEFT KNEE: Status: ACTIVE | Noted: 2020-07-29

## 2020-07-29 PROBLEM — S00.03XA SCALP HEMATOMA: Status: ACTIVE | Noted: 2020-07-29

## 2020-07-29 PROBLEM — S82.141A FRACTURE OF RIGHT TIBIAL PLATEAU: Status: ACTIVE | Noted: 2020-07-29

## 2020-07-29 PROCEDURE — 2580000003 HC RX 258: Performed by: STUDENT IN AN ORGANIZED HEALTH CARE EDUCATION/TRAINING PROGRAM

## 2020-07-29 PROCEDURE — 6370000000 HC RX 637 (ALT 250 FOR IP): Performed by: STUDENT IN AN ORGANIZED HEALTH CARE EDUCATION/TRAINING PROGRAM

## 2020-07-29 PROCEDURE — 6360000002 HC RX W HCPCS: Performed by: STUDENT IN AN ORGANIZED HEALTH CARE EDUCATION/TRAINING PROGRAM

## 2020-07-29 RX ORDER — POLYETHYLENE GLYCOL 3350 17 G/17G
17 POWDER, FOR SOLUTION ORAL DAILY
Qty: 527 G | Refills: 0 | Status: SHIPPED | OUTPATIENT
Start: 2020-07-30 | End: 2020-08-29

## 2020-07-29 RX ORDER — ACETAMINOPHEN 500 MG
1000 TABLET ORAL EVERY 8 HOURS SCHEDULED
Qty: 42 TABLET | Refills: 0 | Status: SHIPPED | OUTPATIENT
Start: 2020-07-29 | End: 2022-10-03

## 2020-07-29 RX ORDER — SENNA PLUS 8.6 MG/1
1 TABLET ORAL DAILY
Qty: 30 TABLET | Refills: 0 | Status: SHIPPED | OUTPATIENT
Start: 2020-07-30 | End: 2020-08-29

## 2020-07-29 RX ORDER — OXYCODONE HYDROCHLORIDE 5 MG/1
5 TABLET ORAL EVERY 6 HOURS PRN
Qty: 10 TABLET | Refills: 0 | Status: SHIPPED | OUTPATIENT
Start: 2020-07-29 | End: 2020-08-03

## 2020-07-29 RX ADMIN — SENNOSIDES 8.6 MG: 8.6 TABLET, FILM COATED ORAL at 08:26

## 2020-07-29 RX ADMIN — OXYCODONE HYDROCHLORIDE 5 MG: 5 TABLET ORAL at 09:43

## 2020-07-29 RX ADMIN — OXYCODONE HYDROCHLORIDE 5 MG: 5 TABLET ORAL at 14:23

## 2020-07-29 RX ADMIN — ACETAMINOPHEN 1000 MG: 500 TABLET ORAL at 05:06

## 2020-07-29 RX ADMIN — OXYCODONE HYDROCHLORIDE 5 MG: 5 TABLET ORAL at 05:06

## 2020-07-29 RX ADMIN — ACETAMINOPHEN 1000 MG: 500 TABLET ORAL at 14:23

## 2020-07-29 RX ADMIN — SODIUM CHLORIDE, PRESERVATIVE FREE 10 ML: 5 INJECTION INTRAVENOUS at 08:26

## 2020-07-29 RX ADMIN — ENOXAPARIN SODIUM 30 MG: 30 INJECTION SUBCUTANEOUS at 08:25

## 2020-07-29 ASSESSMENT — PAIN SCALES - GENERAL
PAINLEVEL_OUTOF10: 7
PAINLEVEL_OUTOF10: 8
PAINLEVEL_OUTOF10: 6
PAINLEVEL_OUTOF10: 7
PAINLEVEL_OUTOF10: 8
PAINLEVEL_OUTOF10: 7

## 2020-07-29 NOTE — PROGRESS NOTES
CLINICAL PHARMACY NOTE: MEDS TO [address] Select Patient?: No  Total # of Prescriptions Filled: 1   The following medications were delivered to the patient:  · lovenox  Total # of Interventions Completed: 0  Time Spent (min): 0    Additional Documentation: medication delivered to the pt room on 07.29.20 at 3:35pm

## 2020-07-29 NOTE — PLAN OF CARE
Problem: Falls - Risk of:  Goal: Will remain free from falls  Description: Will remain free from falls  7/29/2020 1720 by Angelic Smith RN  Outcome: Completed  7/29/2020 0642 by Aggie Murguia RN  Outcome: Ongoing  Goal: Absence of physical injury  Description: Absence of physical injury  7/29/2020 1720 by Angelic Smith RN  Outcome: Completed  7/29/2020 0642 by Aggie Murguia RN  Outcome: Ongoing     Problem: Infection:  Goal: Will remain free from infection  Description: Will remain free from infection  7/29/2020 1720 by Angelic Smith RN  Outcome: Completed  7/29/2020 0642 by Aggie Murguia RN  Outcome: Ongoing     Problem: Safety:  Goal: Free from accidental physical injury  Description: Free from accidental physical injury  7/29/2020 1720 by Angelic Smith RN  Outcome: Completed  7/29/2020 0642 by Aggie Murguia RN  Outcome: Ongoing  Goal: Free from intentional harm  Description: Free from intentional harm  7/29/2020 1720 by Angelic Smith RN  Outcome: Completed  7/29/2020 0642 by Aggie Murguia RN  Outcome: Ongoing     Problem: Daily Care:  Goal: Daily care needs are met  Description: Daily care needs are met  7/29/2020 1720 by Angelic Smith RN  Outcome: Completed  7/29/2020 0642 by Aggie Murguia RN  Outcome: Ongoing     Problem: Pain:  Goal: Patient's pain/discomfort is manageable  Description: Patient's pain/discomfort is manageable  7/29/2020 1720 by Angelic Smith RN  Outcome: Completed  7/29/2020 0642 by Aggie Murguia RN  Outcome: Ongoing  Goal: Pain level will decrease  Description: Pain level will decrease  7/29/2020 1720 by Angelic Smith RN  Outcome: Completed  7/29/2020 0642 by Aggie Murguia RN  Outcome: Ongoing  Goal: Control of acute pain  Description: Control of acute pain  7/29/2020 1720 by Angelic Smith RN  Outcome: Completed  7/29/2020 0642 by Aggie Murguia RN  Outcome: Ongoing  Goal: Control of chronic pain  Description: Control of chronic pain  7/29/2020 1720 by Aly Moya RN  Outcome: Completed  7/29/2020 3947 by Ray Her RN  Outcome: Ongoing     Problem: Skin Integrity:  Goal: Skin integrity will stabilize  Description: Skin integrity will stabilize  7/29/2020 1720 by Aly Moya RN  Outcome: Completed  7/29/2020 0642 by Ray Her RN  Outcome: Ongoing     Problem: Discharge Planning:  Goal: Patients continuum of care needs are met  Description: Patients continuum of care needs are met  7/29/2020 1720 by Aly Moya RN  Outcome: Completed  7/29/2020 0642 by Ray Her RN  Outcome: Ongoing     Problem: Musculor/Skeletal Functional Status  Goal: Highest potential functional level  7/29/2020 1720 by Aly Moya RN  Outcome: Completed  7/29/2020 0642 by Ray Her RN  Outcome: Ongoing     Problem: Skin Integrity:  Goal: Will show no infection signs and symptoms  Description: Will show no infection signs and symptoms  7/29/2020 1720 by Aly Moya RN  Outcome: Completed  7/29/2020 0642 by Ray Her RN  Outcome: Ongoing  Goal: Absence of new skin breakdown  Description: Absence of new skin breakdown  7/29/2020 1720 by Aly Moya RN  Outcome: Completed  7/29/2020 0642 by Ray Her RN  Outcome: Ongoing

## 2020-07-29 NOTE — CARE COORDINATION
I gave patient the Medicaid application form. Patient is staying at his friend's home at this time. His father picked up the wheelchair yesterday. Patient notified me that he does have a slide board as well. Patient has D/C orders at this time. Declines HC needs. He will be getting help from his friend and his roommate's sister (who is a nurse).

## 2020-07-29 NOTE — PROGRESS NOTES
Pt discharged, Pt left will all his belongings. Pt educated on discharge planning and how to self administer Lovenox.

## 2020-07-29 NOTE — PROGRESS NOTES
non-distended, bowel sounds present in all 4 quadrants and no guarding or peritoneal signs present  EXTREMITY: no cyanosis, clubbing or edema,  bruising present bilaterally inferior to popliteal fossa  I/O last 3 completed shifts: In: 250 [P.O.:240; I.V.:10]  Out: 1000 [Urine:1000]    Drain/tube output: In: -   Out: 1200 [Urine:1200]    LAB:  CBC: No results for input(s): WBC, HGB, HCT, MCV, PLT in the last 72 hours. BMP: No results for input(s): NA, K, CL, CO2, BUN, CREATININE, GLUCOSE in the last 72 hours. COAGS: No results for input(s): APTT, PROT, INR in the last 72 hours.     RADIOLOGY:  No new studies      Minnie Humphrey DO  7/29/20, 6:43 AM

## 2020-07-29 NOTE — PLAN OF CARE
Problem: Falls - Risk of:  Goal: Will remain free from falls  Description: Will remain free from falls  7/29/2020 0642 by Sondra Dimas RN  Outcome: Ongoing  7/28/2020 1855 by Eliu Carl RN  Outcome: Ongoing  Goal: Absence of physical injury  Description: Absence of physical injury  7/29/2020 0642 by Sondra Dimas RN  Outcome: Ongoing  7/28/2020 1855 by Eliu Carl RN  Outcome: Ongoing     Problem: Infection:  Goal: Will remain free from infection  Description: Will remain free from infection  7/29/2020 0642 by Sondra Dimas RN  Outcome: Ongoing  7/28/2020 1855 by Eliu Carl RN  Outcome: Ongoing     Problem: Safety:  Goal: Free from accidental physical injury  Description: Free from accidental physical injury  7/29/2020 0642 by Sondra Dimas RN  Outcome: Ongoing  7/28/2020 1855 by Eliu Carl RN  Outcome: Ongoing  Goal: Free from intentional harm  Description: Free from intentional harm  7/29/2020 0642 by Sondra Dimas RN  Outcome: Ongoing  7/28/2020 1855 by Eliu Carl RN  Outcome: Ongoing     Problem: Daily Care:  Goal: Daily care needs are met  Description: Daily care needs are met  7/29/2020 0642 by Sondra Dimas RN  Outcome: Ongoing  7/28/2020 1855 by Eliu Carl RN  Outcome: Ongoing     Problem: Pain:  Goal: Patient's pain/discomfort is manageable  Description: Patient's pain/discomfort is manageable  7/29/2020 0642 by Sondra Dimas RN  Outcome: Ongoing  7/28/2020 1855 by Eliu Carl RN  Outcome: Ongoing  Goal: Pain level will decrease  Description: Pain level will decrease  7/29/2020 0642 by Sondra Dimas RN  Outcome: Ongoing  7/28/2020 1855 by Eliu Carl RN  Outcome: Ongoing  Goal: Control of acute pain  Description: Control of acute pain  7/29/2020 0642 by Sondra Dimas RN  Outcome: Ongoing  7/28/2020 1855 by Eliu Carl RN  Outcome: Ongoing  Goal: Control of chronic pain  Description: Control of chronic pain  7/29/2020 0642 by
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
## 2020-08-03 ENCOUNTER — HOSPITAL ENCOUNTER (OUTPATIENT)
Dept: PREADMISSION TESTING | Age: 40
Setting detail: SPECIMEN
Discharge: HOME | End: 2020-08-07
Payer: MEDICAID

## 2020-08-12 ENCOUNTER — HOSPITAL ENCOUNTER (EMERGENCY)
Age: 40
Discharge: HOME | End: 2020-08-12
Attending: EMERGENCY MEDICINE
Payer: MEDICAID

## 2020-08-12 VITALS
DIASTOLIC BLOOD PRESSURE: 86 MMHG | HEART RATE: 90 BPM | SYSTOLIC BLOOD PRESSURE: 143 MMHG | RESPIRATION RATE: 16 BRPM | TEMPERATURE: 100.4 F | OXYGEN SATURATION: 97 %

## 2020-08-12 PROCEDURE — 99281 EMR DPT VST MAYX REQ PHY/QHP: CPT

## 2020-08-12 RX ORDER — ACETAMINOPHEN 325 MG/1
650 TABLET ORAL ONCE
Status: DISCONTINUED | OUTPATIENT
Start: 2020-08-12 | End: 2020-08-12 | Stop reason: HOSPADM

## 2020-08-12 NOTE — ED NOTES
Pt came into ER in NAD, pt reports he needs his sutures taken out on top of head, pt denies pain, pt denies issues with sutures, will continue to assess      Rina Mckeon RN  08/12/20 1918
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
## 2020-08-13 ASSESSMENT — ENCOUNTER SYMPTOMS
NAUSEA: 0
VOMITING: 0
EYE REDNESS: 0
EYE ITCHING: 0
SORE THROAT: 0
SHORTNESS OF BREATH: 0
COUGH: 0
RHINORRHEA: 0

## 2020-08-13 NOTE — ED PROVIDER NOTES
[facility]  Emergency Department  Faculty Attestation     I performed a history and physical examination of the patient and discussed management with the resident. I reviewed the residents note and agree with the documented findings and plan of care. Any areas of disagreement are noted on the chart. I was personally present for the key portions of any procedures. I have documented in the chart those procedures where I was not present during the key portions. I have reviewed the emergency nurses triage note. I agree with the chief complaint, past medical history, past surgical history, allergies, medications, social and family history as documented unless otherwise noted below. For Physician Assistant/ Nurse Practitioner cases/documentation I have personally evaluated this patient and have completed at least one if not all key elements of the E/M (history, physical exam, and MDM). Additional findings are as noted. Primary Care Physician:  KYA Hernandez - CNP    Screenings:  [unfilled]    CHIEF COMPLAINT       Chief Complaint   Patient presents with    Suture / Staple Removal       RECENT VITALS:   Temp: 100.4 °F (38 °C),  Pulse: 90, Resp: 16, BP: (!) 143/86    LABS:  Labs Reviewed - No data to display    Radiology  No orders to display         Attending Physician Additional  Notes    Patient is here for table removal from his left scalp. He denies any complaints. He is noted to have a fever but he denies covert symptoms coughing vomiting urine symptoms rash change in joint pain or swelling or other source for fever. On exam he has low-grade temperature but appears comfortable and nontoxic. Lungs are clear. Abdomen is benign. Neck is supple. Skin is warm and dry with normal capillary refill. The wound is healing well without erythema warmth induration or tenderness.   Plan is staple removal, counseled regarding fever and COVID precautions, recommend antipyretics monitor vital signs and early follow-up. Anne Garcia MD, [facility]  Attending Emergency  Physician               Keysha Lei MD  08/12/20 2007

## 2021-08-24 ENCOUNTER — OFFICE VISIT (OUTPATIENT)
Dept: FAMILY MEDICINE CLINIC | Age: 41
End: 2021-08-24
Payer: MEDICAID

## 2021-08-24 VITALS
HEIGHT: 71 IN | WEIGHT: 250 LBS | SYSTOLIC BLOOD PRESSURE: 118 MMHG | HEART RATE: 92 BPM | BODY MASS INDEX: 35 KG/M2 | OXYGEN SATURATION: 98 % | DIASTOLIC BLOOD PRESSURE: 84 MMHG

## 2021-08-24 DIAGNOSIS — R94.2 DECREASED FUNCTIONAL RESIDUAL CAPACITY: ICD-10-CM

## 2021-08-24 DIAGNOSIS — T14.90XA TRAUMA: ICD-10-CM

## 2021-08-24 DIAGNOSIS — Z01.89 ROUTINE LAB DRAW: ICD-10-CM

## 2021-08-24 DIAGNOSIS — Z00.00 ENCOUNTER FOR MEDICAL EXAMINATION TO ESTABLISH CARE: ICD-10-CM

## 2021-08-24 DIAGNOSIS — Z53.20: ICD-10-CM
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
## 2021-08-24 DIAGNOSIS — S83.242D ACUTE MEDIAL MENISCUS TEAR OF LEFT KNEE, SUBSEQUENT ENCOUNTER: ICD-10-CM

## 2021-08-24 DIAGNOSIS — K08.9 POOR DENTITION: ICD-10-CM

## 2021-08-24 DIAGNOSIS — Z00.00 ENCOUNTER FOR ANNUAL PHYSICAL EXAM: Primary | ICD-10-CM

## 2021-08-24 PROCEDURE — 99396 PREV VISIT EST AGE 40-64: CPT

## 2021-08-24 ASSESSMENT — ENCOUNTER SYMPTOMS
SHORTNESS OF BREATH: 0
EYE PAIN: 0
APNEA: 0
ABDOMINAL DISTENTION: 0
ABDOMINAL PAIN: 0
COUGH: 0
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
## 2021-08-27 ENCOUNTER — HOSPITAL ENCOUNTER (OUTPATIENT)
Age: 41
Setting detail: SPECIMEN
Discharge: HOME | End: 2021-08-27
Payer: MEDICAID

## 2021-08-27 DIAGNOSIS — Z01.89 ROUTINE LAB DRAW: ICD-10-CM

## 2021-08-27 DIAGNOSIS — Z53.20: ICD-10-CM

## 2021-08-27 LAB
ALBUMIN SERPL-MCNC: 4.2 G/DL (ref 3.5–5.2)
ALBUMIN/GLOBULIN RATIO: 1.8 (ref 1–2.5)
ALP BLD-CCNC: 102 U/L (ref 40–129)
ALT SERPL-CCNC: 14 U/L (ref 5–41)
ANION GAP SERPL CALCULATED.3IONS-SCNC: 13 MMOL/L (ref 9–17)
AST SERPL-CCNC: 15 U/L
BILIRUB SERPL-MCNC: 0.38 MG/DL (ref 0.3–1.2)
BUN BLDV-MCNC: 12 MG/DL (ref 6–20)
BUN/CREAT BLD: NORMAL (ref 9–20)
CALCIUM SERPL-MCNC: 8.7 MG/DL (ref 8.6–10.4)
CHLORIDE BLD-SCNC: 103 MMOL/L (ref 98–107)
CHOLESTEROL/HDL RATIO: 2.6
CHOLESTEROL: 156 MG/DL
CO2: 23 MMOL/L (ref 20–31)
CREAT SERPL-MCNC: 0.86 MG/DL (ref 0.7–1.2)
FOLATE: 12 NG/ML
GFR AFRICAN AMERICAN: >60 ML/MIN
GFR NON-AFRICAN AMERICAN: >60 ML/MIN
GFR SERPL CREATININE-BSD FRML MDRD: NORMAL ML/MIN/{1.73_M2}
GFR SERPL CREATININE-BSD FRML MDRD: NORMAL ML/MIN/{1.73_M2}
GLUCOSE FASTING: 86 MG/DL (ref 70–99)
HCT VFR BLD CALC: 45.9 % (ref 40.7–50.3)
HDLC SERPL-MCNC: 59 MG/DL
HEMOGLOBIN: 14.9 G/DL (ref 13–17)
LDL CHOLESTEROL: 86 MG/DL (ref 0–130)
MCH RBC QN AUTO: 32 PG (ref 25.2–33.5)
MCHC RBC AUTO-ENTMCNC: 32.5 G/DL (ref 28.4–34.8)
MCV RBC AUTO: 98.5 FL (ref 82.6–102.9)
NRBC AUTOMATED: 0 PER 100 WBC
PDW BLD-RTO: 14.2 % (ref 11.8–14.4)
PLATELET # BLD: 234 K/UL (ref 138–453)
PMV BLD AUTO: 11 FL (ref 8.1–13.5)
POTASSIUM SERPL-SCNC: 4.1 MMOL/L (ref 3.7–5.3)
RBC # BLD: 4.66 M/UL (ref 4.21–5.77)
SODIUM BLD-SCNC: 139 MMOL/L (ref 135–144)
TOTAL PROTEIN: 6.6 G/DL (ref 6.4–8.3)
TRIGL SERPL-MCNC: 57 MG/DL
VITAMIN B-12: 413 PG/ML (ref 232–1245)
VITAMIN D 25-HYDROXY: 30.5 NG/ML (ref 30–100)
VLDLC SERPL CALC-MCNC: NORMAL MG/DL (ref 1–30)
WBC # BLD: 8.1 K/UL (ref 3.5–11.3)

## 2021-08-29 LAB
ESTIMATED AVERAGE GLUCOSE: 105 MG/DL
HBA1C MFR BLD: 5.3 % (ref 4–6)

## 2021-09-14 ENCOUNTER — HOSPITAL ENCOUNTER (OUTPATIENT)
Dept: PHYSICAL THERAPY | Facility: CLINIC | Age: 41
Setting detail: THERAPIES SERIES
Discharge: HOME | End: 2021-09-14
Payer: MEDICAID

## 2021-09-14 PROCEDURE — 97750 PHYSICAL PERFORMANCE TEST: CPT

## 2021-10-05 ENCOUNTER — OFFICE VISIT (OUTPATIENT)
Dept: FAMILY MEDICINE CLINIC | Age: 41
End: 2021-10-05
Payer: MEDICAID

## 2021-10-05 VITALS
HEART RATE: 94 BPM | WEIGHT: 244 LBS | OXYGEN SATURATION: 96 % | HEIGHT: 71 IN | DIASTOLIC BLOOD PRESSURE: 82 MMHG | BODY MASS INDEX: 34.16 KG/M2 | SYSTOLIC BLOOD PRESSURE: 140 MMHG
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
## 2021-10-05 DIAGNOSIS — G57.72 COMPLEX REGIONAL PAIN SYNDROME TYPE 2 OF LEFT LOWER EXTREMITY: ICD-10-CM

## 2021-10-05 DIAGNOSIS — K08.9 POOR DENTITION: ICD-10-CM

## 2021-10-05 DIAGNOSIS — R94.2 DECREASED FUNCTIONAL RESIDUAL CAPACITY: Primary | ICD-10-CM

## 2021-10-05 PROCEDURE — 99213 OFFICE O/P EST LOW 20 MIN: CPT

## 2021-10-05 PROCEDURE — G8417 CALC BMI ABV UP PARAM F/U: HCPCS

## 2021-10-05 PROCEDURE — 4004F PT TOBACCO SCREEN RCVD TLK: CPT

## 2021-10-05 PROCEDURE — G8427 DOCREV CUR MEDS BY ELIG CLIN: HCPCS

## 2021-10-05 PROCEDURE — G8484 FLU IMMUNIZE NO ADMIN: HCPCS

## 2021-10-05 ASSESSMENT — ENCOUNTER SYMPTOMS
ABDOMINAL DISTENTION: 0
EYE PAIN: 0
ABDOMINAL PAIN: 0
APNEA: 0
COUGH: 0
SHORTNESS OF BREATH: 0
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
## 2022-02-17 ENCOUNTER — OFFICE VISIT (OUTPATIENT)
Dept: FAMILY MEDICINE CLINIC | Age: 42
End: 2022-02-17
Payer: MEDICAID

## 2022-02-17 VITALS
SYSTOLIC BLOOD PRESSURE: 115 MMHG | WEIGHT: 247 LBS | BODY MASS INDEX: 34.58 KG/M2 | HEIGHT: 71 IN | DIASTOLIC BLOOD PRESSURE: 87 MMHG

## 2022-02-17 DIAGNOSIS — G57.72 COMPLEX REGIONAL PAIN SYNDROME TYPE 2 OF LEFT LOWER EXTREMITY: Primary | ICD-10-CM
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
## 2022-02-17 DIAGNOSIS — A60.01 HERPES SIMPLEX INFECTION OF PENIS: ICD-10-CM

## 2022-02-17 PROCEDURE — G8427 DOCREV CUR MEDS BY ELIG CLIN: HCPCS

## 2022-02-17 PROCEDURE — G8417 CALC BMI ABV UP PARAM F/U: HCPCS

## 2022-02-17 PROCEDURE — 4004F PT TOBACCO SCREEN RCVD TLK: CPT

## 2022-02-17 PROCEDURE — G8484 FLU IMMUNIZE NO ADMIN: HCPCS

## 2022-02-17 PROCEDURE — 99214 OFFICE O/P EST MOD 30 MIN: CPT

## 2022-02-17 RX ORDER — LIDOCAINE 50 MG/G
5 PATCH TOPICAL
COMMUNITY
Start: 2022-02-16

## 2022-02-17 RX ORDER — VALACYCLOVIR HYDROCHLORIDE 1 G/1
1000 TABLET, FILM COATED ORAL DAILY
Qty: 7 TABLET | Refills: 0 | Status: SHIPPED | OUTPATIENT
Start: 2022-02-17 | End: 2022-02-24

## 2022-02-17 RX ORDER — GABAPENTIN 600 MG/1
600 TABLET ORAL
COMMUNITY

## 2022-02-17 RX ORDER — VALACYCLOVIR HYDROCHLORIDE 500 MG/1
500 TABLET, FILM COATED ORAL DAILY
Qty: 90 TABLET | Refills: 1 | Status: SHIPPED | OUTPATIENT
Start: 2022-02-17 | End: 2022-04-22 | Stop reason: SDUPTHER
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
## 2022-02-27 ASSESSMENT — ENCOUNTER SYMPTOMS
ABDOMINAL DISTENTION: 0
APNEA: 0
COUGH: 0
ABDOMINAL PAIN: 0
SHORTNESS OF BREATH: 0
EYE PAIN: 0

## 2022-04-22 ENCOUNTER — OFFICE VISIT (OUTPATIENT)
Dept: FAMILY MEDICINE CLINIC | Age: 42
End: 2022-04-22
Payer: MEDICAID

## 2022-04-22 VITALS
DIASTOLIC BLOOD PRESSURE: 84 MMHG | WEIGHT: 240 LBS | BODY MASS INDEX: 33.6 KG/M2 | OXYGEN SATURATION: 97 % | HEIGHT: 71 IN | HEART RATE: 127 BPM | SYSTOLIC BLOOD PRESSURE: 122 MMHG
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
## 2022-04-22 DIAGNOSIS — A60.01 HERPES SIMPLEX INFECTION OF PENIS: ICD-10-CM

## 2022-04-22 DIAGNOSIS — F32.A DEPRESSION, UNSPECIFIED DEPRESSION TYPE: ICD-10-CM

## 2022-04-22 DIAGNOSIS — F41.9 ANXIETY: ICD-10-CM

## 2022-04-22 DIAGNOSIS — T14.90XA TRAUMA: Primary | ICD-10-CM

## 2022-04-22 PROCEDURE — 99214 OFFICE O/P EST MOD 30 MIN: CPT

## 2022-04-22 PROCEDURE — G8428 CUR MEDS NOT DOCUMENT: HCPCS

## 2022-04-22 PROCEDURE — 4004F PT TOBACCO SCREEN RCVD TLK: CPT

## 2022-04-22 PROCEDURE — 96127 BRIEF EMOTIONAL/BEHAV ASSMT: CPT

## 2022-04-22 PROCEDURE — G8417 CALC BMI ABV UP PARAM F/U: HCPCS

## 2022-04-22 RX ORDER — DULOXETIN HYDROCHLORIDE 30 MG/1
CAPSULE, DELAYED RELEASE ORAL
Qty: 60 CAPSULE | Refills: 0 | Status: SHIPPED
Start: 2022-04-22 | End: 2022-05-24 | Stop reason: DRUGHIGH

## 2022-04-22 RX ORDER — VALACYCLOVIR HYDROCHLORIDE 500 MG/1
500 TABLET, FILM COATED ORAL DAILY
Qty: 90 TABLET | Refills: 1 | Status: SHIPPED | OUTPATIENT
Start: 2022-04-22 | End: 2022-08-25 | Stop reason: SDUPTHER

## 2022-04-22 ASSESSMENT — PATIENT HEALTH QUESTIONNAIRE - PHQ9
1. LITTLE INTEREST OR PLEASURE IN DOING THINGS: 1
SUM OF ALL RESPONSES TO PHQ QUESTIONS 1-9: 13
3. TROUBLE FALLING OR STAYING ASLEEP: 2
8. MOVING OR SPEAKING SO SLOWLY THAT OTHER PEOPLE COULD HAVE NOTICED. OR THE OPPOSITE, BEING SO FIGETY OR RESTLESS THAT YOU HAVE BEEN MOVING AROUND A LOT MORE THAN USUAL: 1
7. TROUBLE CONCENTRATING ON THINGS, SUCH AS READING THE NEWSPAPER OR WATCHING TELEVISION: 1
10. IF YOU CHECKED OFF ANY PROBLEMS, HOW DIFFICULT HAVE THESE PROBLEMS MADE IT FOR YOU TO DO YOUR WORK, TAKE CARE OF THINGS AT HOME, OR GET ALONG WITH OTHER PEOPLE: 1
6. FEELING BAD ABOUT YOURSELF - OR THAT YOU ARE A FAILURE OR HAVE LET YOURSELF OR YOUR FAMILY DOWN: 2
SUM OF ALL RESPONSES TO PHQ QUESTIONS 1-9: 13
SUM OF ALL RESPONSES TO PHQ9 QUESTIONS 1 & 2: 3
SUM OF ALL RESPONSES TO PHQ QUESTIONS 1-9: 13
SUM OF ALL RESPONSES TO PHQ QUESTIONS 1-9: 12
2. FEELING DOWN, DEPRESSED OR HOPELESS: 2
4. FEELING TIRED OR HAVING LITTLE ENERGY: 1
5. POOR APPETITE OR OVEREATING: 2
9. THOUGHTS THAT YOU WOULD BE BETTER OFF DEAD, OR OF HURTING YOURSELF: 1

## 2022-04-22 ASSESSMENT — ANXIETY QUESTIONNAIRES
3. WORRYING TOO MUCH ABOUT DIFFERENT THINGS: 2
1. FEELING NERVOUS, ANXIOUS, OR ON EDGE: 2
2. NOT BEING ABLE TO STOP OR CONTROL WORRYING: 2
GAD7 TOTAL SCORE: 13
7. FEELING AFRAID AS IF SOMETHING AWFUL MIGHT HAPPEN: 1
4. TROUBLE RELAXING: 3
6. BECOMING EASILY ANNOYED OR IRRITABLE: 1
IF YOU CHECKED OFF ANY PROBLEMS ON THIS QUESTIONNAIRE, HOW DIFFICULT HAVE THESE PROBLEMS MADE IT FOR YOU TO DO YOUR WORK, TAKE CARE OF THINGS AT HOME, OR GET ALONG WITH OTHER PEOPLE: SOMEWHAT DIFFICULT
5. BEING SO RESTLESS THAT IT IS HARD TO SIT STILL: 2

## 2022-04-22 ASSESSMENT — COLUMBIA-SUICIDE SEVERITY RATING SCALE - C-SSRS
1. WITHIN THE PAST MONTH, HAVE YOU WISHED YOU WERE DEAD OR WISHED YOU COULD GO TO SLEEP AND NOT WAKE UP?: NO
6. HAVE YOU EVER DONE ANYTHING, STARTED TO DO ANYTHING, OR PREPARED TO DO ANYTHING TO END YOUR LIFE?: NO
2. HAVE YOU ACTUALLY HAD ANY THOUGHTS OF KILLING YOURSELF?: NO
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
## 2022-05-05 ENCOUNTER — TELEPHONE (OUTPATIENT)
Dept: FAMILY MEDICINE CLINIC | Age: 42
End: 2022-05-05

## 2022-05-05 DIAGNOSIS — F32.A DEPRESSION, UNSPECIFIED DEPRESSION TYPE: ICD-10-CM

## 2022-05-05 DIAGNOSIS — F41.9 ANXIETY: ICD-10-CM

## 2022-05-05 DIAGNOSIS — T14.90XA TRAUMA: Primary | ICD-10-CM

## 2022-05-05 NOTE — TELEPHONE ENCOUNTER
Patient called in to office. States he has to send in disability paperwork by the end of this month. He knows he has follow up on 5/24 with you for anx/dep. But he has not seen his psychiatry yet bc they have not called to schedule with him. He is wanting to know what he needs to do next. Does he need a sooner appt with you to discuss disability/have paperwork filed or does he need to have more testing? He states he does not want to drop the ball on this because he has been dealing with disability for 2 years now.

## 2022-05-05 NOTE — TELEPHONE ENCOUNTER
Please advise patient have placed order for him to start therapy at our [address] [facility] office for anxiety and depression, so we have this started. His last note with me is not completely closed yet, but he can print off his last visit note with me next week and also send this in for disability.

## 2022-05-09 ASSESSMENT — ENCOUNTER SYMPTOMS
COUGH: 0
ABDOMINAL PAIN: 0
EYE PAIN: 0
APNEA: 0
ABDOMINAL DISTENTION: 0
SHORTNESS OF BREATH: 0

## 2022-05-18 ENCOUNTER — TELEPHONE (OUTPATIENT)
Dept: FAMILY MEDICINE CLINIC | Age: 42
End: 2022-05-18

## 2022-05-24 ENCOUNTER — OFFICE VISIT (OUTPATIENT)
Dept: FAMILY MEDICINE CLINIC | Age: 42
End: 2022-05-24
Payer: MEDICAID

## 2022-05-24 VITALS
HEART RATE: 150 BPM | HEIGHT: 71 IN | WEIGHT: 237 LBS | OXYGEN SATURATION: 96 % | TEMPERATURE: 97.9 F | BODY MASS INDEX: 33.18 KG/M2

## 2022-05-24 DIAGNOSIS — F90.2 ATTENTION DEFICIT HYPERACTIVITY DISORDER (ADHD), COMBINED TYPE: ICD-10-CM

## 2022-05-24 DIAGNOSIS — G57.72 COMPLEX REGIONAL PAIN SYNDROME TYPE 2 OF LEFT LOWER EXTREMITY: ICD-10-CM
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
## 2022-05-24 DIAGNOSIS — F41.9 ANXIETY: Primary | ICD-10-CM

## 2022-05-24 DIAGNOSIS — F32.A DEPRESSION, UNSPECIFIED DEPRESSION TYPE: ICD-10-CM

## 2022-05-24 PROCEDURE — G8417 CALC BMI ABV UP PARAM F/U: HCPCS

## 2022-05-24 PROCEDURE — 4004F PT TOBACCO SCREEN RCVD TLK: CPT

## 2022-05-24 PROCEDURE — 99215 OFFICE O/P EST HI 40 MIN: CPT

## 2022-05-24 PROCEDURE — G8427 DOCREV CUR MEDS BY ELIG CLIN: HCPCS

## 2022-05-24 PROCEDURE — 96127 BRIEF EMOTIONAL/BEHAV ASSMT: CPT

## 2022-05-24 RX ORDER — BUPROPION HYDROCHLORIDE 150 MG/1
150 TABLET ORAL EVERY MORNING
Qty: 30 TABLET | Refills: 3 | Status: SHIPPED
Start: 2022-05-24 | End: 2022-06-28 | Stop reason: SINTOL

## 2022-05-24 RX ORDER — DULOXETIN HYDROCHLORIDE 60 MG/1
60 CAPSULE, DELAYED RELEASE ORAL DAILY
Qty: 90 CAPSULE | Refills: 1 | Status: SHIPPED | OUTPATIENT
Start: 2022-05-24

## 2022-05-24 ASSESSMENT — ENCOUNTER SYMPTOMS
APNEA: 0
ABDOMINAL PAIN: 0
COUGH: 0
ABDOMINAL DISTENTION: 0
SHORTNESS OF BREATH: 0
EYE PAIN: 0
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
## 2022-05-24 NOTE — PROGRESS NOTES
Rozina Francois is here for evaluation for ADHD.   male is not in school    DSM-IV Diagnostic Criteria for ADHD    Rating scale (Rare- 0, Occasional - 1, Frequent - 2)    Inattention:   · Fails to give close attention to details or makes careless mistakes in schoolwork, work, or other activities: 1  · Has difficulty sustaining attention is tasks or play activities:  1  · Does not seem to listen when spoken to directly:  1  · Does not follow through on instructions and fails to finish schoolwork, chores, or duties(not due to oppositional behavior or failure to understand instr): 1  · Difficulty organizing tasks and activities:  2  · Avoids, dislikes or is reluctant to engage in tasks that require sustained mental effort: 1  · Loses things necessary for tasks or activities:   1  · Easily distracted by extraneous stimuli:  1  · Forgetful in daily activities:  1    InattentionTotal (questions with score of 1 or 2) (9/9):   Total points:10    Hyperactivity:  · Fidgets with hands or feet and squirms in seat:  2  · Leaves seat in classroom or in other situations in which remaining seated is expected :  2  · Runs about or climbs excessively in situations in which it is inappropriate of feelings of restlessness:  0  · Often has difficulty playing or engaging in leisure activities quietly:  1  · \"On the go\" or acts as if \"drivern by a motor\":  2  · Talks excessively:  2    Impulsivity:  · Blurts out answers before questions have been completed:  2  · Difficulty awaiting turn:  1  · Interrupts or intrudes on others:  1  Hyperactive/ImpulsivityTotal (questions with score of 1 or 2) (8/9):  Total points:13

## 2022-05-30 PROBLEM — F41.9 ANXIETY: Status: ACTIVE | Noted: 2022-05-30

## 2022-05-30 PROBLEM — F90.2 ATTENTION DEFICIT HYPERACTIVITY DISORDER (ADHD), COMBINED TYPE: Status: ACTIVE | Noted: 2022-05-30

## 2022-05-30 PROBLEM — F32.A DEPRESSION: Status: ACTIVE | Noted: 2022-05-30

## 2022-06-22 ENCOUNTER — OFFICE VISIT (OUTPATIENT)
Dept: BEHAVIORAL/MENTAL HEALTH CLINIC | Age: 42
End: 2022-06-22
Payer: MEDICAID

## 2022-06-22 DIAGNOSIS — F43.23 ADJUSTMENT DISORDER WITH MIXED ANXIETY AND DEPRESSED MOOD: Primary | ICD-10-CM

## 2022-06-22 PROCEDURE — 90791 PSYCH DIAGNOSTIC EVALUATION: CPT | Performed by: SOCIAL WORKER

## 2022-06-22 PROCEDURE — 4004F PT TOBACCO SCREEN RCVD TLK: CPT | Performed by: SOCIAL WORKER
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
## 2022-06-28 ENCOUNTER — OFFICE VISIT (OUTPATIENT)
Dept: FAMILY MEDICINE CLINIC | Age: 42
End: 2022-06-28
Payer: MEDICAID

## 2022-06-28 VITALS
OXYGEN SATURATION: 98 % | DIASTOLIC BLOOD PRESSURE: 72 MMHG | WEIGHT: 238 LBS | HEIGHT: 71 IN | BODY MASS INDEX: 33.32 KG/M2 | SYSTOLIC BLOOD PRESSURE: 128 MMHG | HEART RATE: 107 BPM

## 2022-06-28 DIAGNOSIS — F90.2 ATTENTION DEFICIT HYPERACTIVITY DISORDER (ADHD), COMBINED TYPE: ICD-10-CM

## 2022-06-28 DIAGNOSIS — Z02.71 ENCOUNTER FOR DISABILITY ASSESSMENT: ICD-10-CM

## 2022-06-28 DIAGNOSIS — F32.A DEPRESSION, UNSPECIFIED DEPRESSION TYPE: ICD-10-CM

## 2022-06-28 DIAGNOSIS — F41.9 ANXIETY: Primary | ICD-10-CM

## 2022-06-28 DIAGNOSIS — Z55.9 LACK OF EDUCATION: ICD-10-CM

## 2022-06-28 PROCEDURE — 4004F PT TOBACCO SCREEN RCVD TLK: CPT

## 2022-06-28 PROCEDURE — G8427 DOCREV CUR MEDS BY ELIG CLIN: HCPCS

## 2022-06-28 PROCEDURE — G8417 CALC BMI ABV UP PARAM F/U: HCPCS

## 2022-06-28 PROCEDURE — 99214 OFFICE O/P EST MOD 30 MIN: CPT

## 2022-06-28 SDOH — EDUCATIONAL SECURITY - EDUCATION ATTAINMENT: PROBLEMS RELATED TO EDUCATION AND LITERACY, UNSPECIFIED: Z55.9

## 2022-06-28 ASSESSMENT — ENCOUNTER SYMPTOMS
SORE THROAT: 0
VOMITING: 0
NAUSEA: 0
SHORTNESS OF BREATH: 0
EYE DISCHARGE: 0
CONSTIPATION: 0
COUGH: 0
DIARRHEA: 0

## 2022-06-28 NOTE — PROGRESS NOTES
Ghada Chiang (:  1980) is a 39 y.o. male,Established patient, here for evaluation of the following chief complaint(s):  Follow-up         ASSESSMENT/PLAN:  1. Anxiety  -     External Referral To Psychiatry  2. Depression, unspecified depression type  -     External Referral To Psychiatry  3. Attention deficit hyperactivity disorder (ADHD), combined type  -     External Referral To Psychiatry  4. Encounter for disability assessment  -     External Referral To Psychiatry  5. Lack of education  -     External Referral To Psychiatry    Continue therapy with Phyllis. Establish with [facility] psych for further diagnosis and treatment. Wellbutrin stopped due to headaches. May benefit from stimulant therapy for ADHD, but would appreciate psychiatry diagnosis. Continue Cymbalta. Continue PT and follow ups with [facility] pain management. Return in about 3 months (around 2022) for physical.         Subjective   SUBJECTIVE/OBJECTIVE:  Patient here for follow up. Was recently started on Wellbutrin for some ADHD. Took for 2 weeks but noted significant headache for those two weeks, and electively stopped. He has remained on Cymbalta 60mg, and is somewhat helpful with her depression and anxiety as well as the CRPS. He has been continuing to follow with pain management with [facility]. No changes in POC at this time. Is considering a spinal cord stimulator for CRPS of the left lower extremity. Continue to do twice monthly physical therapy at the [address] [place] [address]. Patient has tried to establish care with psychiatry given previous referral, however he states they were supposed to call him back to establish, and he has not yet heard from them. I do feel that given patient's chronic condition, pain, potential attention deficit, and lack of education, patient would benefit from having an IQ examination by psychiatry for Social Security disability.   We have discussed him possibly doing a clerical position, however he is slightly reluctant due to his math and reading skills. Review of Systems   Constitutional:  Negative for activity change, fatigue and fever. HENT:  Negative for congestion, dental problem, ear discharge and sore throat. Eyes:  Negative for pain, discharge and visual disturbance. Respiratory:  Negative for apnea, cough and shortness of breath. Cardiovascular:  Negative for chest pain, palpitations and leg swelling. Gastrointestinal:  Negative for abdominal distention, abdominal pain, constipation, diarrhea, nausea and vomiting. Genitourinary:  Negative for decreased urine volume, difficulty urinating, dysuria and urgency. Musculoskeletal:  Positive for gait problem. Negative for arthralgias and myalgias. Skin:  Negative for rash and wound. Genital herpes improved with antivirals. Allergic/Immunologic: Negative for environmental allergies. Neurological:  Positive for weakness and numbness ( top of left foot and ankle and neuralgia ). Negative for dizziness and headaches. Hematological:  Does not bruise/bleed easily. Psychiatric/Behavioral:  Positive for dysphoric mood. Negative for agitation, sleep disturbance and suicidal ideas. The patient is nervous/anxious and is hyperactive. Objective   Physical Exam  Vitals reviewed. Constitutional:       General: He is not in acute distress. Appearance: Normal appearance. He is obese. He is not ill-appearing. Cardiovascular:      Rate and Rhythm: Normal rate and regular rhythm. Heart sounds: Normal heart sounds. No murmur heard. Pulmonary:      Effort: Pulmonary effort is normal.      Breath sounds: Normal breath sounds. Musculoskeletal:         General: Swelling and tenderness present. Comments: Left lower extremity, swelling is less then previous examination. Skin:     General: Skin is warm and dry. Capillary Refill: Capillary refill takes less than 2 seconds.    Neurological: Mental Status: He is alert and oriented to person, place, and time. Motor: No weakness. Gait: Gait normal.   Psychiatric:         Attention and Perception: He is attentive. He does not perceive visual hallucinations. Mood and Affect: Affect normal. Mood is anxious. Speech: Speech is rapid and pressured. Behavior: Behavior is hyperactive. Thought Content: Thought content does not include homicidal or suicidal ideation. Thought content does not include homicidal or suicidal plan. Comments: Anxiety is better controlled on duloxetine. On this date 6/28/2022 I have spent 30 minutes reviewing previous notes, test results, disability paperwork, and face to face with the patient discussing the diagnosis and importance of compliance with the treatment plan as well as documenting on the day of the visit. An electronic signature was used to authenticate this note.     --KYA Pak - CNP

## 2022-07-11 ENCOUNTER — TELEPHONE (OUTPATIENT)
Dept: FAMILY MEDICINE CLINIC | Age: 42
End: 2022-07-11

## 2022-07-11 DIAGNOSIS — F90.8 ATTENTION DEFICIT HYPERACTIVITY DISORDER (ADHD), OTHER TYPE: Primary | ICD-10-CM

## 2022-07-14 ENCOUNTER — OFFICE VISIT (OUTPATIENT)
Dept: BEHAVIORAL/MENTAL HEALTH CLINIC | Age: 42
End: 2022-07-14
Payer: MEDICAID

## 2022-07-14 DIAGNOSIS — F43.23 ADJUSTMENT DISORDER WITH MIXED ANXIETY AND DEPRESSED MOOD: Primary | ICD-10-CM

## 2022-07-14 PROCEDURE — 4004F PT TOBACCO SCREEN RCVD TLK: CPT | Performed by: SOCIAL WORKER

## 2022-07-14 PROCEDURE — 90832 PSYTX W PT 30 MINUTES: CPT | Performed by: SOCIAL WORKER

## 2022-07-18 DIAGNOSIS — F41.9 ANXIETY: ICD-10-CM

## 2022-07-18 DIAGNOSIS — F32.A DEPRESSION, UNSPECIFIED DEPRESSION TYPE: ICD-10-CM

## 2022-07-18 NOTE — TELEPHONE ENCOUNTER
left lower extremity     Attention deficit hyperactivity disorder (ADHD), combined type     Adjustment disorder with mixed anxiety and depressed mood

## 2022-07-22 RX ORDER — DULOXETIN HYDROCHLORIDE 30 MG/1
CAPSULE, DELAYED RELEASE ORAL
Qty: 90 CAPSULE | Refills: 1 | OUTPATIENT
Start: 2022-07-22

## 2022-07-25 ASSESSMENT — ENCOUNTER SYMPTOMS
EYE PAIN: 0
APNEA: 0
ABDOMINAL DISTENTION: 0
ABDOMINAL PAIN: 0

## 2022-08-04 ENCOUNTER — OFFICE VISIT (OUTPATIENT)
Dept: BEHAVIORAL/MENTAL HEALTH CLINIC | Age: 42
End: 2022-08-04
Payer: MEDICAID

## 2022-08-04 DIAGNOSIS — F43.23 ADJUSTMENT DISORDER WITH MIXED ANXIETY AND DEPRESSED MOOD: Primary | ICD-10-CM

## 2022-08-04 PROCEDURE — 90834 PSYTX W PT 45 MINUTES: CPT | Performed by: SOCIAL WORKER

## 2022-08-04 PROCEDURE — 4004F PT TOBACCO SCREEN RCVD TLK: CPT | Performed by: SOCIAL WORKER

## 2022-08-04 NOTE — PROGRESS NOTES
ADULT BEHAVIORAL HEALTH FOLLOW UP  ELKE Kirkpatrick  Licensed Independent        Visit Date: 8/4/2022   Time of appointment:  [phone]   Time spent with Patient: 43 minutes. This is patient's third appointment. Reason for Consult:  Anxiety and Stress     Referring Provider/PCP:    No ref. provider found  KYA Wise - CNP      Pt provided informed consent for the behavioral health program. Discussed with patient model of service to include the limits of confidentiality (i.e. abuse reporting, suicide intervention, etc.) and short-term intervention focused approach. Pt indicated understanding. Ras Finney is a 39 y.o. male who presents for follow up of anxiety and stress. He reports increased stress with application for SSA, we reviewed new paperwork he was encouraged to complete and return so they can obtain necessary records. New list provided for psychology testing, encouraged him to call and contact us if they need a referral. We processed options for continued pain and frustration, including guided meditation videos to assist in calm and focus as well as progress with pacing pain management to allow recovery instead of exhaustion. We also reviewed assistance for smoking cessation options. He was reminded he is doing the best he can, and making these huge life adjustments after a trauma takes time, to be patient with himself. Previous Recommendations: Britni Mitchell will practice grounding and defusion for personification thoughts. He will follow up with Geraldine Hanks in 3 weeks. MENTAL STATUS EXAM  Mood was anxious with anxious affect. Suicidal ideation was denied. Homicidal ideation was denied. Hygiene was fair . Dress was appropriate. Behavior was Restless & fidgety with Yes observation or self-report of difficulties ambulating, uses a cane, stands for appointment due to pain. Attitude was Cooperative. Eye-contact was good.   Speech: rate - WNL, rhythm - WNL, volume - WNL. Verbalizations were coherent. Thought processes were intact and goal-oriented without evidence of delusions, hallucinations, obsessions, or josé miguel; with moderate cognitive distortions. Associations were characterized by intact cognitive processes. Pt was oriented to person, place, time, and general circumstances;  recent:  good and remote:  good. Insight and judgment were estimated to be fair, AEB, a fair  understanding of cyclical maladaptive patterns, and the ability to use insight to inform behavior change. ASSESSMENT  Randall Cope presented to the appointment today for evaluation and treatment of symptoms of anxiety. He is currently deemed no risk to himself or others and meets criteria for Adjustment disorder. Cassie Jensen was in agreement with recommendations. PHQ Scores 4/22/2022 8/29/2019   PHQ2 Score 3 0   PHQ9 Score 13 0     Interpretation of Total Score Depression Severity: 1-4 = Minimal depression, 5-9 = Mild depression, 10-14 = Moderate depression, 15-19 = Moderately severe depression, 20-27 = Severe depression    How often pt has had thoughts of death or hurting self (if PHQ positive for depression):       ZORAIDA 7 SCORE 4/22/2022   ZORAIDA-7 Total Score 13     Interpretation of ZORAIDA-7 score: 5-9 = mild anxiety, 10-14 = moderate anxiety, 15+ = severe anxiety. Recommend referral to behavioral health for scores 10 or greater. DIAGNOSIS  Génesis Ansari was seen today for anxiety and stress.     Diagnoses and all orders for this visit:    Adjustment disorder with mixed anxiety and depressed mood        INTERVENTION  Trained in strategies for increasing balanced thinking, Trained in relaxation strategies, Discussed self-care (sleep, nutrition, rewarding activities, social support, exercise), Discussed potential barriers to change, Established rapport, Supportive techniques, and Provided Psychoeducation re: pain management, guided meditation      PLAN  Cassie Jensen will practice guided meditation to assist in calming his mind. He will follow up with Cassie Montanez in 1 month. INTERACTIVE COMPLEXITY  Is interactive complexity present?   No  Reason:  N/A  Additional Supporting Information:  N/A       Electronically signed by ELKE Manzano on 8/4/22 at 9:34 AM EDT

## 2022-08-25 ENCOUNTER — OFFICE VISIT (OUTPATIENT)
Dept: FAMILY MEDICINE CLINIC | Age: 42
End: 2022-08-25
Payer: MEDICAID

## 2022-08-25 VITALS
WEIGHT: 243 LBS | OXYGEN SATURATION: 97 % | DIASTOLIC BLOOD PRESSURE: 80 MMHG | HEART RATE: 114 BPM | BODY MASS INDEX: 34.02 KG/M2 | SYSTOLIC BLOOD PRESSURE: 136 MMHG | HEIGHT: 71 IN

## 2022-08-25 DIAGNOSIS — G57.72 COMPLEX REGIONAL PAIN SYNDROME TYPE 2 OF LEFT LOWER EXTREMITY: ICD-10-CM

## 2022-08-25 DIAGNOSIS — F43.23 ADJUSTMENT DISORDER WITH MIXED ANXIETY AND DEPRESSED MOOD: ICD-10-CM

## 2022-08-25 DIAGNOSIS — Z00.01 ENCOUNTER FOR GENERAL ADULT MEDICAL EXAMINATION WITH ABNORMAL FINDINGS: Primary | ICD-10-CM
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
## 2022-08-25 DIAGNOSIS — A60.01 HERPES SIMPLEX INFECTION OF PENIS: ICD-10-CM

## 2022-08-25 DIAGNOSIS — T14.90XA TRAUMA: ICD-10-CM

## 2022-08-25 PROCEDURE — 99214 OFFICE O/P EST MOD 30 MIN: CPT

## 2022-08-25 PROCEDURE — 99396 PREV VISIT EST AGE 40-64: CPT

## 2022-08-25 RX ORDER — IBUPROFEN 800 MG/1
800 TABLET ORAL EVERY 12 HOURS PRN
Qty: 60 TABLET | Refills: 0 | Status: SHIPPED | OUTPATIENT
Start: 2022-08-25 | End: 2022-09-24

## 2022-08-25 RX ORDER — VALACYCLOVIR HYDROCHLORIDE 500 MG/1
500 TABLET, FILM COATED ORAL DAILY
Qty: 90 TABLET | Refills: 1 | Status: SHIPPED | OUTPATIENT
Start: 2022-08-25 | End: 2022-11-23

## 2022-08-25 SDOH — ECONOMIC STABILITY: FOOD INSECURITY: WITHIN THE PAST 12 MONTHS, YOU WORRIED THAT YOUR FOOD WOULD RUN OUT BEFORE YOU GOT MONEY TO BUY MORE.: NEVER TRUE

## 2022-08-25 SDOH — ECONOMIC STABILITY: FOOD INSECURITY: WITHIN THE PAST 12 MONTHS, THE FOOD YOU BOUGHT JUST DIDN'T LAST AND YOU DIDN'T HAVE MONEY TO GET MORE.: NEVER TRUE

## 2022-08-25 ASSESSMENT — SOCIAL DETERMINANTS OF HEALTH (SDOH): HOW HARD IS IT FOR YOU TO PAY FOR THE VERY BASICS LIKE FOOD, HOUSING, MEDICAL CARE, AND HEATING?: NOT HARD AT ALL

## 2022-08-25 ASSESSMENT — ENCOUNTER SYMPTOMS
SHORTNESS OF BREATH: 0
NAUSEA: 0
ABDOMINAL PAIN: 0
SORE THROAT: 0
DIARRHEA: 0
VOMITING: 0
APNEA: 0
COUGH: 0
CONSTIPATION: 0
EYE DISCHARGE: 0
EYE PAIN: 0
ABDOMINAL DISTENTION: 0
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
## 2022-09-01 ENCOUNTER — OFFICE VISIT (OUTPATIENT)
Dept: BEHAVIORAL/MENTAL HEALTH CLINIC | Age: 42
End: 2022-09-01
Payer: MEDICAID

## 2022-09-01 DIAGNOSIS — F43.23 ADJUSTMENT DISORDER WITH MIXED ANXIETY AND DEPRESSED MOOD: Primary | ICD-10-CM

## 2022-09-01 PROCEDURE — 90832 PSYTX W PT 30 MINUTES: CPT | Performed by: SOCIAL WORKER

## 2022-09-01 PROCEDURE — 4004F PT TOBACCO SCREEN RCVD TLK: CPT | Performed by: SOCIAL WORKER

## 2022-09-01 NOTE — PROGRESS NOTES
ADULT BEHAVIORAL HEALTH FOLLOW UP  ELKE Gabriel  Licensed Independent        Visit Date: 9/1/2022   Time of appointment:  [phone]   Time spent with Patient: 34 minutes. This is patient's fourth appointment. Reason for Consult:  Stress and Anxiety     Referring Provider/PCP:    No ref. provider found  KYA Calvillo - CNP      Pt provided informed consent for the behavioral health program. Discussed with patient model of service to include the limits of confidentiality (i.e. abuse reporting, suicide intervention, etc.) and short-term intervention focused approach. Pt indicated understanding. Josep Castañeda is a 39 y.o. male who presents for follow up of stress. He reports doing well, presents calmer today, utilizing previously discussed skills and states this is helping him accept what is out of his control in life. We reviewed difficulties with pain management and disability benefits, encouraged him to contact medical records to obtain any records needed for this. Offered SDOH referral, he declines at this time, states can get thru his insurance. He states he was unable to reach anyone for psychology testing so far, has had trouble getting calls back, will try other options from list provided. He requests next appointment 4 months out to give him time to follow up with these other needs. Previous Recommendations: Sarah Found will practice guided meditation to assist in calming his mind. He will follow up with Farhan Mccoy in 1 month. MENTAL STATUS EXAM  Mood was within normal limits with calm affect. Suicidal ideation was denied. Homicidal ideation was denied. Hygiene was good . Dress was appropriate. Behavior was Within Normal Limits with Sometimes observation or self-report of difficulties ambulating, uses a cane. Attitude was Cooperative. Eye-contact was good. Speech: rate - WNL, rhythm - WNL, volume - WNL. Verbalizations were coherent.   Thought processes were intact and goal-oriented without evidence of delusions, hallucinations, obsessions, or josé miguel; with moderate cognitive distortions. Associations were characterized by intact cognitive processes. Pt was oriented to person, place, time, and general circumstances;  recent:  good and remote:  good. Insight and judgment were estimated to be fair, AEB, a fair  understanding of cyclical maladaptive patterns, and the ability to use insight to inform behavior change. ASSESSMENT  Deb Cruz presented to the appointment today for evaluation and treatment of symptoms of stress. He is currently deemed no risk to himself or others and meets criteria for Adjustment disorder. Marquis Andrews was in agreement with recommendations. PHQ Scores 4/22/2022 8/29/2019   PHQ2 Score 3 0   PHQ9 Score 13 0     Interpretation of Total Score Depression Severity: 1-4 = Minimal depression, 5-9 = Mild depression, 10-14 = Moderate depression, 15-19 = Moderately severe depression, 20-27 = Severe depression    How often pt has had thoughts of death or hurting self (if PHQ positive for depression):       ZORAIDA 7 SCORE 4/22/2022   ZORAIDA-7 Total Score 13     Interpretation of ZORAIDA-7 score: 5-9 = mild anxiety, 10-14 = moderate anxiety, 15+ = severe anxiety. Recommend referral to behavioral health for scores 10 or greater. DIAGNOSIS  Chantal Chapman was seen today for stress and anxiety. Diagnoses and all orders for this visit:    Adjustment disorder with mixed anxiety and depressed mood        INTERVENTION  Trained in strategies for increasing balanced thinking, Trained in relaxation strategies, Discussed self-care (sleep, nutrition, rewarding activities, social support, exercise), Discussed potential barriers to change, Established rapport, and Supportive techniques      PLAN  Marquis Andrews will follow up with external resources for further assistance. He will follow up with Deisy Sanchez in 4 months.       INTERACTIVE COMPLEXITY  Is interactive complexity present?   No  Reason:  N/A  Additional Supporting Information:  N/A       Electronically signed by ELKE Collier on 9/1/22 at 2:33 PM EDT

## 2022-09-22 ENCOUNTER — OFFICE VISIT (OUTPATIENT)
Dept: FAMILY MEDICINE CLINIC | Age: 42
End: 2022-09-22
Payer: MEDICAID

## 2022-09-22 VITALS
SYSTOLIC BLOOD PRESSURE: 138 MMHG | HEIGHT: 71 IN | HEART RATE: 115 BPM | DIASTOLIC BLOOD PRESSURE: 84 MMHG | OXYGEN SATURATION: 98 % | WEIGHT: 244 LBS | BODY MASS INDEX: 34.16 KG/M2
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
## 2022-09-22 DIAGNOSIS — G57.72 COMPLEX REGIONAL PAIN SYNDROME TYPE 2 OF LEFT LOWER EXTREMITY: Primary | ICD-10-CM

## 2022-09-22 DIAGNOSIS — K08.9 POOR DENTITION: ICD-10-CM

## 2022-09-22 DIAGNOSIS — Z99.89 AMBULATES WITH CANE: Chronic | ICD-10-CM

## 2022-09-22 PROCEDURE — 4004F PT TOBACCO SCREEN RCVD TLK: CPT

## 2022-09-22 PROCEDURE — 99214 OFFICE O/P EST MOD 30 MIN: CPT

## 2022-09-22 PROCEDURE — G8417 CALC BMI ABV UP PARAM F/U: HCPCS

## 2022-09-22 PROCEDURE — G8427 DOCREV CUR MEDS BY ELIG CLIN: HCPCS

## 2022-09-22 ASSESSMENT — ENCOUNTER SYMPTOMS
ABDOMINAL PAIN: 0
APNEA: 0
VOMITING: 0
COUGH: 0
DIARRHEA: 0
NAUSEA: 0
SORE THROAT: 0
CONSTIPATION: 0
EYE PAIN: 0
SHORTNESS OF BREATH: 0
EYE DISCHARGE: 0
ABDOMINAL DISTENTION: 0
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
## 2022-10-03 ENCOUNTER — OFFICE VISIT (OUTPATIENT)
Dept: PAIN MANAGEMENT | Age: 42
End: 2022-10-03
Payer: MEDICAID

## 2022-10-03 VITALS — BODY MASS INDEX: 34.16 KG/M2 | WEIGHT: 244 LBS | HEIGHT: 71 IN

## 2022-10-03 DIAGNOSIS — G57.72 COMPLEX REGIONAL PAIN SYNDROME TYPE 2 OF LEFT LOWER EXTREMITY: Primary | ICD-10-CM

## 2022-10-03 DIAGNOSIS — G89.29 OTHER CHRONIC PAIN: ICD-10-CM

## 2022-10-03 PROCEDURE — G8417 CALC BMI ABV UP PARAM F/U: HCPCS | Performed by: PAIN MEDICINE

## 2022-10-03 PROCEDURE — 4004F PT TOBACCO SCREEN RCVD TLK: CPT | Performed by: PAIN MEDICINE

## 2022-10-03 PROCEDURE — 99204 OFFICE O/P NEW MOD 45 MIN: CPT | Performed by: PAIN MEDICINE

## 2022-10-03 PROCEDURE — G8484 FLU IMMUNIZE NO ADMIN: HCPCS | Performed by: PAIN MEDICINE

## 2022-10-03 PROCEDURE — G8427 DOCREV CUR MEDS BY ELIG CLIN: HCPCS | Performed by: PAIN MEDICINE
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
## 2022-10-21 ENCOUNTER — TELEPHONE (OUTPATIENT)
Dept: FAMILY MEDICINE CLINIC | Age: 42
End: 2022-10-21

## 2022-10-21 NOTE — TELEPHONE ENCOUNTER
Called and spoke with patient myself to let him know that no paperwork has been received from his , and to clarify what is needed. It seems patient capacity test has  2021, and he's thinking that's what is needing to be done but is not entirely sure himself. I told patient to have his  to give the office a call and ask to speak with me personally, I told him I do leave at 130 today and confirmed the fax number with him. I also let him know that his  needs to go through Medical Records for any further new results/documentation needed. He expressed understanding of this.    ----- Message from Julia Killian sent at 10/19/2022  2:42 PM EDT -----  Subject: Message to Provider    QUESTIONS  Information for Provider? Pt stated that his  sent his social security paperwork in about a week ago and hasn't received anything back yet. He also thinks he may needs another functionality capacity test. Pls give the pt a call with a status update.  ---------------------------------------------------------------------------  --------------  Idris Jackman KSTIFFANIE  [phone]; OK to leave message on voicemail  ---------------------------------------------------------------------------  --------------  SCRIPT ANSWERS  Relationship to Patient?  Self

## 2022-11-07 ENCOUNTER — OFFICE VISIT (OUTPATIENT)
Dept: PAIN MANAGEMENT | Age: 42
End: 2022-11-07
Payer: MEDICAID

## 2022-11-07 VITALS — HEIGHT: 71 IN | WEIGHT: 244 LBS | BODY MASS INDEX: 34.16 KG/M2

## 2022-11-07 DIAGNOSIS — G89.29 OTHER CHRONIC PAIN: ICD-10-CM

## 2022-11-07 DIAGNOSIS — G57.72 COMPLEX REGIONAL PAIN SYNDROME TYPE 2 OF LEFT LOWER EXTREMITY: Primary | ICD-10-CM

## 2022-11-07 PROCEDURE — 99214 OFFICE O/P EST MOD 30 MIN: CPT | Performed by: PAIN MEDICINE

## 2022-11-07 PROCEDURE — G8417 CALC BMI ABV UP PARAM F/U: HCPCS | Performed by: PAIN MEDICINE

## 2022-11-07 PROCEDURE — 4004F PT TOBACCO SCREEN RCVD TLK: CPT | Performed by: PAIN MEDICINE

## 2022-11-07 PROCEDURE — G8484 FLU IMMUNIZE NO ADMIN: HCPCS | Performed by: PAIN MEDICINE

## 2022-11-07 PROCEDURE — G8427 DOCREV CUR MEDS BY ELIG CLIN: HCPCS | Performed by: PAIN MEDICINE

## 2022-11-07 RX ORDER — GABAPENTIN 600 MG/1
600 TABLET ORAL 3 TIMES DAILY
Qty: 90 TABLET | Refills: 2 | Status: SHIPPED | OUTPATIENT
Start: 2022-11-07 | End: 2022-12-07
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
## 2022-12-05 DIAGNOSIS — G57.72 COMPLEX REGIONAL PAIN SYNDROME TYPE 2 OF LEFT LOWER EXTREMITY: ICD-10-CM

## 2022-12-05 DIAGNOSIS — F32.A DEPRESSION, UNSPECIFIED DEPRESSION TYPE: ICD-10-CM
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
## 2022-12-05 DIAGNOSIS — F41.9 ANXIETY: ICD-10-CM

## 2022-12-08 RX ORDER — DULOXETIN HYDROCHLORIDE 60 MG/1
60 CAPSULE, DELAYED RELEASE ORAL DAILY
Qty: 90 CAPSULE | Refills: 1 | Status: SHIPPED | OUTPATIENT
Start: 2022-12-08 | End: 2023-12-08
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
## 2023-01-05 ENCOUNTER — OFFICE VISIT (OUTPATIENT)
Dept: BEHAVIORAL/MENTAL HEALTH CLINIC | Age: 43
End: 2023-01-05
Payer: MEDICAID

## 2023-01-05 DIAGNOSIS — F43.23 ADJUSTMENT DISORDER WITH MIXED ANXIETY AND DEPRESSED MOOD: Primary | ICD-10-CM

## 2023-01-05 PROCEDURE — 90832 PSYTX W PT 30 MINUTES: CPT | Performed by: SOCIAL WORKER

## 2023-01-05 PROCEDURE — 4004F PT TOBACCO SCREEN RCVD TLK: CPT | Performed by: SOCIAL WORKER

## 2023-01-05 NOTE — PROGRESS NOTES
ADULT BEHAVIORAL HEALTH FOLLOW UP  Alberta OnesimoELKE  Licensed Independent        Visit Date: 1/5/2023   Time of appointment:  [id number]   Time spent with Patient: 30 minutes. This is patient's fifth appointment. Reason for Consult:  Stress     Referring Provider/PCP:    No ref. provider found  KYA Dias - CNP      Pt provided informed consent for the behavioral health program. Discussed with patient model of service to include the limits of confidentiality (i.e. abuse reporting, suicide intervention, etc.) and short-term intervention focused approach. Pt indicated understanding. Farrah Salazar is a 43 y.o. male who presents for follow up of stress. He reports doing well, following up with pain management and assistance for social security. He states his mood has been stable, utilizing previously discussed skills has been helpful. He reports using progress with pacing as well for pain, acknowledges need to continue this in order to avoid further pain issues. He denies any current concerns, we will move appointments to as needed. Previous Recommendations: Roberto Fierro will follow up with external resources for further assistance. He will follow up with Cassie Montanez in 4 months. MENTAL STATUS EXAM  Mood was within normal limits with calm affect. Suicidal ideation was denied. Homicidal ideation was denied. Hygiene was fair . Dress was appropriate. Behavior was Within Normal Limits with Yes observation or self-report of difficulties ambulating, uses a cane. Attitude was Cooperative. Eye-contact was good. Speech: rate - WNL, rhythm - WNL, volume - WNL. Verbalizations were coherent. Thought processes were intact and goal-oriented without evidence of delusions, hallucinations, obsessions, or josé miguel; with moderate cognitive distortions. Associations were characterized by intact cognitive processes.   Pt was oriented to person, place, time, and general circumstances;  recent:  good and remote:  good. Insight and judgment were estimated to be fair, AEB, a fair  understanding of cyclical maladaptive patterns, and the ability to use insight to inform behavior change. ASSESSMENT  Megan Jackson presented to the appointment today for evaluation and treatment of symptoms of stress. He is currently deemed no risk to himself or others and meets criteria for Adjustment disorder. Roberto Sri was in agreement with recommendations. PHQ Scores 4/22/2022 8/29/2019   PHQ2 Score 3 0   PHQ9 Score 13 0     Interpretation of Total Score Depression Severity: 1-4 = Minimal depression, 5-9 = Mild depression, 10-14 = Moderate depression, 15-19 = Moderately severe depression, 20-27 = Severe depression    How often pt has had thoughts of death or hurting self (if PHQ positive for depression):       ZORAIDA 7 SCORE 4/22/2022   ZORAIDA-7 Total Score 13     Interpretation of ZORAIDA-7 score: 5-9 = mild anxiety, 10-14 = moderate anxiety, 15+ = severe anxiety. Recommend referral to behavioral health for scores 10 or greater. DIAGNOSIS  Francheska Clarke was seen today for stress. Diagnoses and all orders for this visit:    Adjustment disorder with mixed anxiety and depressed mood        INTERVENTION  Trained in strategies for increasing balanced thinking, Discussed self-care (sleep, nutrition, rewarding activities, social support, exercise), and Supportive techniques      Cira Kamara will continue healthy use of coping skills. He will follow up with Cassie Montanez as needed. INTERACTIVE COMPLEXITY  Is interactive complexity present?   No  Reason:  N/A  Additional Supporting Information:  N/A       Electronically signed by ELKE Manzano on 1/5/23 at 10:02 AM EST

## 2023-01-18 ENCOUNTER — OFFICE VISIT (OUTPATIENT)
Dept: FAMILY MEDICINE CLINIC | Age: 43
End: 2023-01-18
Payer: COMMERCIAL

## 2023-01-18 VITALS
BODY MASS INDEX: 34.76 KG/M2 | DIASTOLIC BLOOD PRESSURE: 84 MMHG | SYSTOLIC BLOOD PRESSURE: 118 MMHG | HEART RATE: 106 BPM | WEIGHT: 249.2 LBS | TEMPERATURE: 98 F

## 2023-01-18 DIAGNOSIS — Z01.89 ROUTINE LAB DRAW: ICD-10-CM

## 2023-01-18 DIAGNOSIS — Z13.220 LIPID SCREENING: ICD-10-CM

## 2023-01-18 DIAGNOSIS — F43.23 ADJUSTMENT DISORDER WITH MIXED ANXIETY AND DEPRESSED MOOD: Primary | ICD-10-CM

## 2023-01-18 DIAGNOSIS — Z11.59 NEED FOR HEPATITIS C SCREENING TEST: ICD-10-CM

## 2023-01-18 DIAGNOSIS — R26.2 DISABILITY OF WALKING: ICD-10-CM

## 2023-01-18 DIAGNOSIS — G57.72 COMPLEX REGIONAL PAIN SYNDROME TYPE 2 OF LEFT LOWER EXTREMITY: ICD-10-CM

## 2023-01-18 PROCEDURE — 4004F PT TOBACCO SCREEN RCVD TLK: CPT

## 2023-01-18 PROCEDURE — G8484 FLU IMMUNIZE NO ADMIN: HCPCS

## 2023-01-18 PROCEDURE — 99215 OFFICE O/P EST HI 40 MIN: CPT

## 2023-01-18 PROCEDURE — G8417 CALC BMI ABV UP PARAM F/U: HCPCS

## 2023-01-18 PROCEDURE — G8427 DOCREV CUR MEDS BY ELIG CLIN: HCPCS

## 2023-01-18 ASSESSMENT — PATIENT HEALTH QUESTIONNAIRE - PHQ9
8. MOVING OR SPEAKING SO SLOWLY THAT OTHER PEOPLE COULD HAVE NOTICED. OR THE OPPOSITE, BEING SO FIGETY OR RESTLESS THAT YOU HAVE BEEN MOVING AROUND A LOT MORE THAN USUAL: 0
6. FEELING BAD ABOUT YOURSELF - OR THAT YOU ARE A FAILURE OR HAVE LET YOURSELF OR YOUR FAMILY DOWN: 2
5. POOR APPETITE OR OVEREATING: 0
7. TROUBLE CONCENTRATING ON THINGS, SUCH AS READING THE NEWSPAPER OR WATCHING TELEVISION: 0
2. FEELING DOWN, DEPRESSED OR HOPELESS: 2
9. THOUGHTS THAT YOU WOULD BE BETTER OFF DEAD, OR OF HURTING YOURSELF: 0
SUM OF ALL RESPONSES TO PHQ QUESTIONS 1-9: 6
3. TROUBLE FALLING OR STAYING ASLEEP: 1
SUM OF ALL RESPONSES TO PHQ QUESTIONS 1-9: 6
SUM OF ALL RESPONSES TO PHQ QUESTIONS 1-9: 6
4. FEELING TIRED OR HAVING LITTLE ENERGY: 1
10. IF YOU CHECKED OFF ANY PROBLEMS, HOW DIFFICULT HAVE THESE PROBLEMS MADE IT FOR YOU TO DO YOUR WORK, TAKE CARE OF THINGS AT HOME, OR GET ALONG WITH OTHER PEOPLE: 0
SUM OF ALL RESPONSES TO PHQ9 QUESTIONS 1 & 2: 2
1. LITTLE INTEREST OR PLEASURE IN DOING THINGS: 0
SUM OF ALL RESPONSES TO PHQ QUESTIONS 1-9: 6
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
## 2023-02-08 ENCOUNTER — OFFICE VISIT (OUTPATIENT)
Dept: PAIN MANAGEMENT | Age: 43
End: 2023-02-08

## 2023-02-08 VITALS — HEIGHT: 71 IN | WEIGHT: 249 LBS | BODY MASS INDEX: 34.86 KG/M2
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
## 2023-02-08 DIAGNOSIS — G57.72 COMPLEX REGIONAL PAIN SYNDROME TYPE 2 OF LEFT LOWER EXTREMITY: ICD-10-CM

## 2023-02-08 DIAGNOSIS — F41.9 ANXIETY: ICD-10-CM

## 2023-02-08 DIAGNOSIS — G89.29 OTHER CHRONIC PAIN: ICD-10-CM

## 2023-02-08 DIAGNOSIS — F32.A DEPRESSION, UNSPECIFIED DEPRESSION TYPE: ICD-10-CM

## 2023-02-08 RX ORDER — GABAPENTIN 600 MG/1
600 TABLET ORAL 3 TIMES DAILY
Qty: 90 TABLET | Refills: 2 | Status: SHIPPED | OUTPATIENT
Start: 2023-02-08 | End: 2023-03-10

## 2023-02-08 RX ORDER — ENOXAPARIN SODIUM 100 MG/ML
INJECTION SUBCUTANEOUS EVERY 12 HOURS
COMMUNITY

## 2023-02-08 RX ORDER — VALACYCLOVIR HYDROCHLORIDE 500 MG/1
TABLET, FILM COATED ORAL
COMMUNITY
Start: 2023-01-25

## 2023-02-08 RX ORDER — CYCLOBENZAPRINE HCL 10 MG
10 TABLET ORAL DAILY PRN
Qty: 30 TABLET | Refills: 0 | Status: SHIPPED | OUTPATIENT
Start: 2023-02-08 | End: 2023-03-10

## 2023-02-08 ASSESSMENT — PATIENT HEALTH QUESTIONNAIRE - PHQ9
2. FEELING DOWN, DEPRESSED OR HOPELESS: 0
SUM OF ALL RESPONSES TO PHQ QUESTIONS 1-9: 0
1. LITTLE INTEREST OR PLEASURE IN DOING THINGS: 0
SUM OF ALL RESPONSES TO PHQ9 QUESTIONS 1 & 2: 0
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
## 2023-02-09 ASSESSMENT — ENCOUNTER SYMPTOMS
COUGH: 0
SHORTNESS OF BREATH: 0
CONSTIPATION: 0

## 2023-02-13 ASSESSMENT — ENCOUNTER SYMPTOMS
ABDOMINAL DISTENTION: 0
ABDOMINAL PAIN: 0
VOMITING: 0
NAUSEA: 0
CONSTIPATION: 0
COUGH: 0
EYE DISCHARGE: 0
APNEA: 0
SORE THROAT: 0
DIARRHEA: 0
EYE PAIN: 0
SHORTNESS OF BREATH: 0

## 2023-02-14 ENCOUNTER — HOSPITAL ENCOUNTER (OUTPATIENT)
Age: 43
Setting detail: SPECIMEN
Discharge: HOME | End: 2023-02-14

## 2023-02-14 DIAGNOSIS — Z01.89 ROUTINE LAB DRAW: ICD-10-CM

## 2023-02-14 DIAGNOSIS — Z13.220 LIPID SCREENING: ICD-10-CM

## 2023-02-14 DIAGNOSIS — Z11.59 NEED FOR HEPATITIS C SCREENING TEST: ICD-10-CM

## 2023-02-14 LAB
ABSOLUTE EOS #: 0.18 K/UL (ref 0–0.44)
ABSOLUTE IMMATURE GRANULOCYTE: 0.04 K/UL (ref 0–0.3)
ABSOLUTE LYMPH #: 3.83 K/UL (ref 1.1–3.7)
ABSOLUTE MONO #: 0.73 K/UL (ref 0.1–1.2)
ALBUMIN SERPL-MCNC: 4.5 G/DL (ref 3.5–5.2)
ALBUMIN/GLOBULIN RATIO: 1.5 (ref 1–2.5)
ALP SERPL-CCNC: 104 U/L (ref 40–129)
ALT SERPL-CCNC: 20 U/L (ref 5–41)
ANION GAP SERPL CALCULATED.3IONS-SCNC: 9 MMOL/L (ref 9–17)
AST SERPL-CCNC: 17 U/L
BASOPHILS # BLD: 1 % (ref 0–2)
BASOPHILS ABSOLUTE: 0.07 K/UL (ref 0–0.2)
BILIRUB SERPL-MCNC: 0.4 MG/DL (ref 0.3–1.2)
BUN SERPL-MCNC: 13 MG/DL (ref 6–20)
CALCIUM SERPL-MCNC: 9.1 MG/DL (ref 8.6–10.4)
CHLORIDE SERPL-SCNC: 102 MMOL/L (ref 98–107)
CHOLEST SERPL-MCNC: 199 MG/DL
CHOLESTEROL/HDL RATIO: 3.4
CO2 SERPL-SCNC: 28 MMOL/L (ref 20–31)
CREAT SERPL-MCNC: 0.94 MG/DL (ref 0.7–1.2)
EOSINOPHILS RELATIVE PERCENT: 2 % (ref 1–4)
GFR SERPL CREATININE-BSD FRML MDRD: >60 ML/MIN/1.73M2
GLUCOSE SERPL-MCNC: 108 MG/DL (ref 70–99)
HCT VFR BLD AUTO: 48 % (ref 40.7–50.3)
HCV AB SER QL: NONREACTIVE
HDLC SERPL-MCNC: 58 MG/DL
HGB BLD-MCNC: 16.4 G/DL (ref 13–17)
IMMATURE GRANULOCYTES: 0 %
LDLC SERPL CALC-MCNC: 128 MG/DL (ref 0–130)
LYMPHOCYTES # BLD: 36 % (ref 24–43)
MCH RBC QN AUTO: 33.5 PG (ref 25.2–33.5)
MCHC RBC AUTO-ENTMCNC: 34.2 G/DL (ref 28.4–34.8)
MCV RBC AUTO: 98.2 FL (ref 82.6–102.9)
MONOCYTES # BLD: 7 % (ref 3–12)
NRBC AUTOMATED: 0 PER 100 WBC
PDW BLD-RTO: 13.9 % (ref 11.8–14.4)
PLATELET # BLD AUTO: 261 K/UL (ref 138–453)
PMV BLD AUTO: 10.5 FL (ref 8.1–13.5)
POTASSIUM SERPL-SCNC: 4.2 MMOL/L (ref 3.7–5.3)
PROT SERPL-MCNC: 7.6 G/DL (ref 6.4–8.3)
RBC # BLD: 4.89 M/UL (ref 4.21–5.77)
SEG NEUTROPHILS: 54 % (ref 36–65)
SEGMENTED NEUTROPHILS ABSOLUTE COUNT: 5.66 K/UL (ref 1.5–8.1)
SODIUM SERPL-SCNC: 139 MMOL/L (ref 135–144)
TRIGL SERPL-MCNC: 64 MG/DL
WBC # BLD AUTO: 10.5 K/UL (ref 3.5–11.3)

## 2023-02-15 ENCOUNTER — HOSPITAL ENCOUNTER (OUTPATIENT)
Dept: PHYSICAL THERAPY | Facility: CLINIC | Age: 43
Setting detail: THERAPIES SERIES
Discharge: HOME | End: 2023-02-15
Payer: MEDICAID

## 2023-02-15 PROCEDURE — 97530 THERAPEUTIC ACTIVITIES: CPT

## 2023-02-15 PROCEDURE — 97163 PT EVAL HIGH COMPLEX 45 MIN: CPT
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
## 2023-02-21 ENCOUNTER — HOSPITAL ENCOUNTER (OUTPATIENT)
Dept: PHYSICAL THERAPY | Facility: CLINIC | Age: 43
Setting detail: THERAPIES SERIES
Discharge: HOME | End: 2023-02-21
Payer: MEDICAID

## 2023-02-21 PROCEDURE — 97110 THERAPEUTIC EXERCISES: CPT

## 2023-02-21 PROCEDURE — 97140 MANUAL THERAPY 1/> REGIONS: CPT

## 2023-02-21 NOTE — FLOWSHEET NOTE
[] [facility]  [address]  [phone]  F: [phone] [] [address]  [facility]   [address]  P: [phone]  F: [phone] [] [address]  [address]  P: [phone]  F: [phone] [x] [address]  P: [phone]  F: [phone] [] [address]  [facility]   [address]   [place]: [phone]  F: [phone]      Physical Therapy Daily Treatment Note    Date:  2023  Patient Name:  Broderick Golden    :  1980  MRN: [id number]  Physician: KYA Diaz CNP                                 Insurance: [facility] (30 visits)  Medical Diagnosis: G57.72 (ICD-10-CM) - Complex regional pain syndrome type 2 of left lower extremity                   Rehab Codes: Left lower extremity pain M79.662  Onset date:  2020                      Next Dr's appt. : 2023  Visit# / total visits: ; Progress note for Medicare patient due at visit 10     Cancels/No Shows: 0/0    Subjective:    Pain:  [x] Yes  [] No Location: LLE Pain Rating: (0-10 scale) 5-6/10  Pain altered Tx:  [] No  [x] Yes  Action: desensitization,   Comments: Pt reported continued LE pain with most of it in the calf and ankle, stating that it gets worse at night due to pressure and swelling. Objective:  Modalities:   Precautions:  Exercises:  Exercise Reps/ Time Weight/ Level Comments   nustep 5 min Level 2          Supine/long seated      Quad set 10x     Hip abduction 2x10     TA isos  2x10     Hamstring set.   10x     Manual 9 min  desensitization         Ambulation x                                                                 Other:    Patient was informed of the potential benefits of Dry Needling. Patient was informed of risks including but not limited to drowsiness, dizziness, minor bruising or bleeding, temporary pain, tingling, numbness and a low risk of pneumothorax. Patient gave verbal consent to proceed and signed a Dry Needling Acknowledgement form. Dry Needling performed in conjunction with Manual therapy to promote tissue extensibility, improve ROM, and reduce pain. No charge submitted for the time the needle was inserted. 4 left piriformis trigger points treated with a 75 mm needle using a bony backdrop for safety. Multiple twitch responses produced. Specific Instructions for next treatment: Dry Needling, Hip and Core strength, Nu-step, progressive desensitization. Treatment Charges: Mins Units   []  Modalities     []  Ther Exercise 30 2   []  Manual Therapy 9 1   []  Ther Activities     []  Aquatics     []  Vasocompression     []  Other     Total Treatment time 39 3       Assessment: [x] Progressing toward goals. Pt initiated treatment on nustep. Pt completed mat activities as listed above with poor tolerance due to pain and pt getting off task. Pt received minor manual for desensitization to left calf with poor  tolerance even with light pressure. Updated HEP as listed below    [] No change. [] Other:  [x] Patient would continue to benefit from skilled physical therapy services in order to: The patient is a 43year old male with a chief complaint of lower leg pain and signs and symptoms consistent with a diagnosis of Chronic Regional Pain Syndrome. He presents with pain, weakness, decreased range of motion, hypersensitivity and functional limitations. He will benefit from skilled PT to address above deficts. STG/LTG    STG: (to be met in 6 treatments)  ? Pain: 5/10  Patient to be independent with home exercise program as demonstrated by performance with correct form without cues.      LTG: (to be met in 16 treatments)  Patient will be able to tolerate wearing an AFO to improve gait and activity tolerance. Patient will ambulate without an assistive device around the house. Patient will perform light house work without assistance. Patient will improve LEFS score by > 10%. Patient goals: Reduce pain    Pt. Education:  [x] Yes  [] No  [x] Reviewed Prior HEP/Ed  Method of Education: [x] Verbal  [x] Demo  [] Written  Comprehension of Education:  [x] Verbalizes understanding. [x] Demonstrates understanding. [x] Needs review. [] Demonstrates/verbalizes HEP/Ed previously given. Access Code: [email or web address]  URL: [email or web address]  Date: 02/21/2023  Prepared by: [facility]    Exercises  Supine Heel Slides - 2-3 x daily - 7 x weekly - 2-3 sets - 10 reps  Supine Hip Abduction - 2-3 x daily - 7 x weekly - 2-3 sets - 10 reps  Supine Quad Set - 2-3 x daily - 7 x weekly - 2-3 sets - 10 reps    Patient Education  Rubbing with Different Textures     Plan: [x] Continue current frequency toward long and short term goals. [x] Specific Instructions for subsequent treatments: Dry Needling, Hip and Core strength, Nu-step, progressive desensitization.       Time In:1105            Time Out: 1149    Electronically signed by:  Adry Chandra PTA

## 2023-02-28 ENCOUNTER — APPOINTMENT (OUTPATIENT)
Dept: PHYSICAL THERAPY | Facility: CLINIC | Age: 43
End: 2023-02-28
Payer: MEDICAID

## 2023-03-07 ENCOUNTER — HOSPITAL ENCOUNTER (OUTPATIENT)
Dept: PHYSICAL THERAPY | Facility: CLINIC | Age: 43
Setting detail: THERAPIES SERIES
Discharge: HOME | End: 2023-03-07
Payer: MEDICAID

## 2023-03-07 PROCEDURE — 97530 THERAPEUTIC ACTIVITIES: CPT

## 2023-03-07 PROCEDURE — 97140 MANUAL THERAPY 1/> REGIONS: CPT

## 2023-03-07 NOTE — FLOWSHEET NOTE
[] [facility].  [address]  [phone]  F: [phone] [] [address]  [facility]   [address]  P: [phone]  F: [phone] [] [address]  [address]  P: [phone]  F: [phone] [x] [address]  P: [phone]  F: [phone] [] [address]  [facility]   [address]   [place]: [phone]  F: [phone]      Physical Therapy Daily Treatment Note    Date:  3/7/2023  Patient Name:  Norlin Baumgarten    :  1980  MRN: [id number]  Physician: KYA Tobin CNP                                 Insurance: [facility] (30 visits)  Medical Diagnosis: G57.72 (ICD-10-CM) - Complex regional pain syndrome type 2 of left lower extremity                   Rehab Codes: Left lower extremity pain M79.662  Onset date:  2020                      Next Dr's appt. : 2023  Visit# / total visits: 3/16; Progress note for Medicare patient due at visit 10     Cancels/No Shows: 0/0    Subjective:    Pain:  [x] Yes  [] No Location: LLE Pain Rating: (0-10 scale) 5-6/10  Pain altered Tx:  [] No  [x] Yes  Action: desensitization,   Comments: Pt reported continued LE pain especially in the foot. Objective:  Modalities:   Precautions: CRPS, foot drop left  Exercises:  Exercise Reps/ Time Weight/ Level Comments   nustep 5 min Level 2          Supine/long seated      Quad set 10x     Hip abduction 2x10     TA isos  2x10     Hamstring set. 10x     Manual 9 min  desensitization         Ambulation x                                                                 Other:  NARGIS:  Continued pain science education with explanation of CRPS.   Emphasis on the concepts of sensitivity and the distinction of pain versus tissue damage. Discussed progressive desensitization and the importance of movement. Manual desensitization with light to moderate pressure to medial and lateral calf 3x30\" each side     Dry Needling performed in conjunction with Manual therapy to promote tissue extensibility, improve ROM, and reduce pain. No charge submitted for the time the needle was inserted. 3 medial and 3 lateral left gastrocnemius  trigger points treated with a 25 mm needle using a bony backdrop for safety. Multiple twitch responses produced. Patient reported pain during but reduced \"tigthness after\"        Specific Instructions for next treatment: Dry Needling, Hip and Core strength, Nu-step, progressive desensitization. Treatment Charges: Mins Units   []  Modalities     []  Ther Exercise     [x]  Manual Therapy 8 1   [x]  Ther Activities 20 1   []  Aquatics     []  Dry Needling  5    []  Other     Total Treatment time 33 2       Assessment: [x] Progressing toward goals. Pt has poor tolerance to plantar flexed position due to increased pain in foot and ankle. He is sensitive to motion and palpation. Continued with patient education on pain science in conjunction with desensitization and Dry Needling. Patient has good tolerance. Will continue as tolerated. [] No change. [] Other:  [x] Patient would continue to benefit from skilled physical therapy services in order to: The patient is a 43year old male with a chief complaint of lower leg pain and signs and symptoms consistent with a diagnosis of Chronic Regional Pain Syndrome. He presents with pain, weakness, decreased range of motion, hypersensitivity and functional limitations. He will benefit from skilled PT to address above deficts. STG/LTG    STG: (to be met in 6 treatments)  ? Pain: 5/10  Patient to be independent with home exercise program as demonstrated by performance with correct form without cues.      LTG: (to be met in 12 treatments)  Patient will be able to tolerate wearing an AFO to improve gait and activity tolerance. Patient will ambulate without an assistive device around the house. Patient will perform light house work without assistance. Patient will improve LEFS score by > 10%. Patient goals: Reduce pain    Pt. Education:  [x] Yes  [] No  [x] Reviewed Prior HEP/Ed  Method of Education: [x] Verbal  [x] Demo  [] Written  Comprehension of Education:  [x] Verbalizes understanding. [x] Demonstrates understanding. [x] Needs review. [] Demonstrates/verbalizes HEP/Ed previously given. Access Code: [email or web address]  URL: [email or web address]  Date: 02/21/2023  Prepared by: [facility]    Exercises  Supine Heel Slides - 2-3 x daily - 7 x weekly - 2-3 sets - 10 reps  Supine Hip Abduction - 2-3 x daily - 7 x weekly - 2-3 sets - 10 reps  Supine Quad Set - 2-3 x daily - 7 x weekly - 2-3 sets - 10 reps    Patient Education  Rubbing with Different Textures     Plan: [x] Continue current frequency toward long and short term goals. [x] Specific Instructions for subsequent treatments: Dry Needling, Hip and Core strength, Nu-step, progressive desensitization.       Time In:1050            Time Out: 1125    Electronically signed by:  Sharlene Max PT

## 2023-03-14 ENCOUNTER — HOSPITAL ENCOUNTER (OUTPATIENT)
Dept: PHYSICAL THERAPY | Facility: CLINIC | Age: 43
Setting detail: THERAPIES SERIES
Discharge: HOME | End: 2023-03-14
Payer: MEDICAID

## 2023-03-14 PROCEDURE — 97530 THERAPEUTIC ACTIVITIES: CPT

## 2023-03-14 PROCEDURE — 97140 MANUAL THERAPY 1/> REGIONS: CPT

## 2023-03-14 NOTE — FLOWSHEET NOTE
[] [facility]  [address]  [phone]  F: [phone] [] [address]  [facility] 36   [address]  P: [phone]  F: [phone] [] [address]  [address]  P: [phone]  F: [phone] [x] [address]  P: [phone]  F: [phone] [] [address]  [facility]   [address]   [place]: [phone]  F: [phone]      Physical Therapy Daily Treatment Note    Date:  3/14/2023  Patient Name:  Gianluca Rivera    :  1980  MRN: [id number]  Physician: KYA Bradley CNP                                 Insurance: [facility] (30 visits)  Medical Diagnosis: G57.72 (ICD-10-CM) - Complex regional pain syndrome type 2 of left lower extremity                   Rehab Codes: Left lower extremity pain M79.662  Onset date:  2020                      Next Dr's appt. : 2023  Visit# / total visits:      Cancels/No Shows: 0/0    Subjective:    Pain:  [x] Yes  [] No Location: LLE Pain Rating: (0-10 scale) 7/10  Pain altered Tx:  [] No  [x] Yes  Action: desensitization,   Comments: Pt reported continued LE pain especially in the foot. He has some short term relief following last visit that he attributes to Dry Needling but the pain returned to baseline by the end of the day. Objective:  Modalities:   Precautions: CRPS, foot drop left  Exercises:  Exercise Reps/ Time Weight/ Level Comments   nustep 5 min Level 2          Supine/long seated      Quad set 10x     Hip abduction 2x10     TA isos  2x10     Hamstring set.   10x     Manual 3 min  desensitization         Ambulation x                                                                 Other:  NARGIS:  Continued pain science education with explanation of CRPS. Emphasis on the concepts of sensitivity and the distinction of pain versus tissue damage. Discussed progressive desensitization and the importance of movement. Manual desensitization with light to moderate pressure to medial and lateral calf 3x30\" each side     Dry Needling performed in conjunction with Manual therapy to promote tissue extensibility, improve ROM, and reduce pain. No charge submitted for the time the needle was inserted. 2 medial and 2 lateral left gastrocnemius and soleus  trigger points treated with a 25 mm needle using a bony backdrop for safety. Multiple twitch responses produced. Patient reported pain during but reduced \"tigthness after\"        Specific Instructions for next treatment: Dry Needling, Hip and Core strength, Nu-step, progressive desensitization. Treatment Charges: Mins Units   []  Modalities     []  Ther Exercise     [x]  Manual Therapy 8 1   [x]  Ther Activities 20 1   []  Aquatics     []  Dry Needling  5    []  Other     Total Treatment time 33 2       Assessment: [x] Progressing toward goals. Pt has poor tolerance to plantar flexed position due to increased pain in foot and ankle. He had increase in complaints of lower leg pain from 7/10 to 9/10 after Dry Needling, Manual therapy and nu-step. Will continue with POC depending on response over the next week. [] No change. [] Other:  [x] Patient would continue to benefit from skilled physical therapy services in order to: The patient is a 43year old male with a chief complaint of lower leg pain and signs and symptoms consistent with a diagnosis of Chronic Regional Pain Syndrome. He presents with pain, weakness, decreased range of motion, hypersensitivity and functional limitations. He will benefit from skilled PT to address above deficts. STG/LTG    STG: (to be met in 6 treatments)  ?  Pain: 5/10  Patient to be independent with home exercise program as demonstrated by performance with correct form without cues. LTG: (to be met in 16 treatments)  Patient will be able to tolerate wearing an AFO to improve gait and activity tolerance. Patient will ambulate without an assistive device around the house. Patient will perform light house work without assistance. Patient will improve LEFS score by > 10%. Patient goals: Reduce pain    Pt. Education:  [x] Yes  [] No  [x] Reviewed Prior HEP/Ed  Method of Education: [x] Verbal  [x] Demo  [] Written  Comprehension of Education:  [x] Verbalizes understanding. [x] Demonstrates understanding. [x] Needs review. [] Demonstrates/verbalizes HEP/Ed previously given. Access Code: [email or web address]  URL: [email or web address]  Date: 02/21/2023  Prepared by: [id number] [facility]    Exercises  Supine Heel Slides - 2-3 x daily - 7 x weekly - 2-3 sets - 10 reps  Supine Hip Abduction - 2-3 x daily - 7 x weekly - 2-3 sets - 10 reps  Supine Quad Set - 2-3 x daily - 7 x weekly - 2-3 sets - 10 reps    Patient Education  Rubbing with Different Textures     Plan: [x] Continue current frequency toward long and short term goals. [x] Specific Instructions for subsequent treatments: Dry Needling, Hip and Core strength, Nu-step, progressive desensitization.       Time In:1100            Time Out: 1133    Electronically signed by:  Jayde Gr, PT

## 2023-03-20 RX ORDER — CYCLOBENZAPRINE HCL 10 MG
TABLET ORAL
Qty: 30 TABLET | Refills: 0 | Status: SHIPPED | OUTPATIENT
Start: 2023-03-20

## 2023-03-21 ENCOUNTER — HOSPITAL ENCOUNTER (OUTPATIENT)
Dept: PHYSICAL THERAPY | Facility: CLINIC | Age: 43
Setting detail: THERAPIES SERIES
Discharge: HOME | End: 2023-03-21
Payer: MEDICAID

## 2023-03-21 PROCEDURE — 97530 THERAPEUTIC ACTIVITIES: CPT

## 2023-03-21 PROCEDURE — 97110 THERAPEUTIC EXERCISES: CPT

## 2023-03-21 NOTE — FLOWSHEET NOTE
with explanation of CRPS. Emphasis on the concepts of sensitivity and the distinction of pain versus tissue damage. Discussed progressive desensitization and the importance of movement. Manual desensitization with light to moderate pressure to medial and lateral calf 3x30\" each side     Dry Needling performed in conjunction with Manual therapy to promote tissue extensibility, improve ROM, and reduce pain. No charge submitted for the time the needle was inserted. 4 medial hamstring trigger points treated with a 50 mm needle using a bony backdrop for safety. Multiple twitch responses produced. Patient reported pain during but reduced \"tigthness after\"        Specific Instructions for next treatment: Dry Needling, Hip and Core strength, Nu-step, progressive desensitization. Treatment Charges: Mins Units   []  Modalities     [x]  Ther Exercise 15 1   [x]  Manual Therapy 5    [x]  Ther Activities 10 1   []  Aquatics     []  Dry Needling  3    []  Other     Total Treatment time 33 2       Assessment: [x] Progressing toward goals. Pt has poor tolerance to plantar flexed position due to increased pain in foot and ankle. Began with exercise for hip strength and light cardio with fair tolerance. Palpation of medial HS reproduced foot and ankle symptoms. Dry Needling performed to medial HS. He will return in 2 weeks prior to follow up with pain management. [] No change. [] Other:  [x] Patient would continue to benefit from skilled physical therapy services in order to: The patient is a 43year old male with a chief complaint of lower leg pain and signs and symptoms consistent with a diagnosis of Chronic Regional Pain Syndrome. He presents with pain, weakness, decreased range of motion, hypersensitivity and functional limitations. He will benefit from skilled PT to address above deficts. STG/LTG    STG: (to be met in 6 treatments)  ?  Pain: 5/10  Patient to be independent with home exercise

## 2023-03-22 ENCOUNTER — TELEPHONE (OUTPATIENT)
Dept: FAMILY MEDICINE CLINIC | Age: 43
End: 2023-03-22

## 2023-03-22 NOTE — TELEPHONE ENCOUNTER
This sheet was already completed once. I did redo sheet. Please send ASAP to appropriate requesting Marianna July.

## 2023-04-04 ENCOUNTER — HOSPITAL ENCOUNTER (OUTPATIENT)
Dept: PHYSICAL THERAPY | Facility: CLINIC | Age: 43
Setting detail: THERAPIES SERIES
Discharge: HOME | End: 2023-04-04
Payer: MEDICAID

## 2023-04-04 PROCEDURE — 97110 THERAPEUTIC EXERCISES: CPT

## 2023-04-04 PROCEDURE — 97530 THERAPEUTIC ACTIVITIES: CPT

## 2023-04-04 NOTE — PROGRESS NOTES
[] [facility]) [facility] [place] &  Therapy  [address]  [phone]  F: [phone] [] [address]  [facility]   [address]  P: [phone]  F: [phone] [] [place]  [address]  P: [phone]  F: [phone] [x] [address]  P: [phone]  F: [phone] [] [address]  [address] [facility]   [address]   [place]: [phone]  F: [phone]      Physical Therapy Progress Note    Date: 2023      Patient: Grupo Betts  : 1980  MRN: [id number]    Physician: KYA Valenzuela CNP                                 Insurance: [employer] [facility] (30 visits)  Medical Diagnosis: G57.72 (ICD-10-CM) - Complex regional pain syndrome type 2 of left lower extremity                   Rehab Codes: Left lower extremity pain M79.662  Onset date:  2020                      Next 's appt. : 2023  Visit# / total visits: 6                                  Cancels/No Shows: 0/0  Date range of services: 2/15/23 to 23      Subjective:    Pain:  [x] Yes  [] No   Location: LLE  Pain Rating: (0-10 scale) 6/10  Pain altered Tx:  [] No  [x] Yes  Action: desensitization,   Comments: Pt reported continued LE pain especially in the foot. He has been working on his HEP and self desensitization. He has noted increased irritation for 1-3 days following Dry Needling, his symptoms then return to baseline. Objective:  Test Measurements:   Hypersensitive to palpation below knee on left  Function:   Uses a single point cane with steppage gait due to left foot drop    Assessment:  [x] Progressing toward goals. The patient has had 6 PT visits for CRPS including a trial of Dr Andreas Brand.   He has increased symptoms following

## 2023-04-04 NOTE — FLOWSHEET NOTE
[] [facility] [address]  [address]  [phone]  F: [phone] [] [address]  [facility]   [address]  P: [phone]  F: [phone] [] [address]  [address]  P: [phone]  F: [phone] [x] [address]  P: [phone]  F: [phone] [] [address]  [facility]   [address]   [place]: [phone]  F: [phone]      Physical Therapy Daily Treatment Note    Date:  2023  Patient Name:  Broderick Golden    :  1980  MRN: [id number]  Physician: KYA Diaz CNP                                 Insurance: [facility] (30 visits)  Medical Diagnosis: G57.72 (ICD-10-CM) - Complex regional pain syndrome type 2 of left lower extremity                   Rehab Codes: Left lower extremity pain M79.662  Onset date:  2020                      Next Dr's appt. : 2023  Visit# / total visits:      Cancels/No Shows: 0/0    Subjective:    Pain:  [x] Yes  [] No Location: LLE Pain Rating: (0-10 scale) 6/10  Pain altered Tx:  [] No  [x] Yes  Action: desensitization,   Comments: Pt reported continued LE pain especially in the foot. He has been working on his HEP and self desensitization. He has noted increased irritation for 1-3 days following Dry Needling, his symptoms then return to baseline. Objective:  Modalities:   Precautions: CRPS, foot drop left  Exercises:  Exercise Reps/ Time Weight/ Level Comments   nustep 5 min Level 3          Supine/long seated      Quad set 10x     Hip abduction 2x10     TA isos  2x10     TA March 10 ea     Hamstring set.        Manual 3 min  desensitization   SLR 10     Hip extension 2x10     Knee curl 2x10     Hip abduction 10     Prone

## 2023-04-12 PROBLEM — G89.29 OTHER CHRONIC PAIN: Status: ACTIVE | Noted: 2023-04-12

## 2023-05-02 ENCOUNTER — HOSPITAL ENCOUNTER (OUTPATIENT)
Dept: PHYSICAL THERAPY | Facility: CLINIC | Age: 43
Setting detail: THERAPIES SERIES
Discharge: HOME | End: 2023-05-02
Payer: MEDICAID

## 2023-05-02 PROCEDURE — 97530 THERAPEUTIC ACTIVITIES: CPT

## 2023-05-02 PROCEDURE — 97110 THERAPEUTIC EXERCISES: CPT

## 2023-05-02 NOTE — FLOWSHEET NOTE
[] [facility] [address]  [address]  [phone]  F: [phone] [] [address]  [facility]   [address]  P: [phone]  F: [phone] [] [address]  [address]  P: [phone]  F: [phone] [x] [address]  P: [phone]  F: [phone] [] [address]  [facility]   [address]   [place]: [phone]  F: [phone]      Physical Therapy Daily Treatment Note    Date:  2023  Patient Name:  Svitlana Hunter    :  1980  MRN: [id number]  Physician: KYA Rose CNP                                 Insurance: [facility] (30 visits)  Medical Diagnosis: G57.72 (ICD-10-CM) - Complex regional pain syndrome type 2 of left lower extremity                   Rehab Codes: Left lower extremity pain M79.662  Onset date:  2020                      Next 's appt. : 2023  Visit# / total visits:      Cancels/No Shows: 0/0    Subjective:    Pain:  [x] Yes  [] No Location: LLE Pain Rating: (0-10 scale) 6/10  Pain altered Tx:  [] No  [x] Yes  Action: desensitization,   Comments: Pt reported continued LE pain especially in the foot. He has returned to pain management to discuss treatment options. They discussed a SCS and he has a referral to Podiatry. Objective:  Modalities:   Precautions: CRPS, foot drop left  Exercises:  Exercise Reps/ Time  Weight/ Level Comments   nustep 5 min  Level 3           Supine/long seated       Quad set 10x      Hip abduction 2x10      TA isos  2x10      TA March 10 ea      Hamstring set.         Manual 3 min x  desensitization   SLR 10      Hip extension 2x10      Knee curl 2x10      Hip abduction 10      Prone hip ext 10      Prone knee flex

## 2023-05-08 ENCOUNTER — OFFICE VISIT (OUTPATIENT)
Dept: PODIATRY | Age: 43
End: 2023-05-08
Payer: MEDICAID

## 2023-05-08 VITALS — HEIGHT: 71 IN | BODY MASS INDEX: 34.86 KG/M2 | WEIGHT: 249 LBS

## 2023-05-08 DIAGNOSIS — M25.472 PAIN AND SWELLING OF LEFT ANKLE: ICD-10-CM

## 2023-05-08 DIAGNOSIS — M24.572 EQUINUS CONTRACTURE OF LEFT ANKLE: ICD-10-CM

## 2023-05-08 DIAGNOSIS — M79.672 PAIN IN LEFT FOOT: ICD-10-CM

## 2023-05-08 DIAGNOSIS — M21.372 FOOT DROP, LEFT FOOT: Primary | ICD-10-CM

## 2023-05-08 DIAGNOSIS — M25.572 PAIN AND SWELLING OF LEFT ANKLE: ICD-10-CM

## 2023-05-08 PROCEDURE — 99203 OFFICE O/P NEW LOW 30 MIN: CPT | Performed by: PODIATRIST

## 2023-05-08 PROCEDURE — G8417 CALC BMI ABV UP PARAM F/U: HCPCS | Performed by: PODIATRIST

## 2023-05-08 PROCEDURE — G8427 DOCREV CUR MEDS BY ELIG CLIN: HCPCS | Performed by: PODIATRIST

## 2023-05-08 PROCEDURE — 4004F PT TOBACCO SCREEN RCVD TLK: CPT | Performed by: PODIATRIST

## 2023-05-08 ASSESSMENT — ENCOUNTER SYMPTOMS
DIARRHEA: 0
SHORTNESS OF BREATH: 0
COLOR CHANGE: 0
BACK PAIN: 0
NAUSEA: 0
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
## 2023-05-16 ENCOUNTER — HOSPITAL ENCOUNTER (OUTPATIENT)
Dept: PHYSICAL THERAPY | Facility: CLINIC | Age: 43
Setting detail: THERAPIES SERIES
Discharge: HOME | End: 2023-05-16
Payer: MEDICAID

## 2023-05-16 PROCEDURE — 97530 THERAPEUTIC ACTIVITIES: CPT

## 2023-05-16 PROCEDURE — 97110 THERAPEUTIC EXERCISES: CPT

## 2023-05-16 NOTE — FLOWSHEET NOTE
[] [facility] [address]  [address]  [phone]  F: [phone] [] [address]  [place] [address]   [address]  P: [phone]  F: [phone] [] [address]  [address] [facility]  P: [phone]  F: [phone] [x] [address]  P: [phone]  F: [phone] [] [address]  [facility]   [address]   [place]: [phone]  F: [phone]      Physical Therapy Daily Treatment Note    Date:  2023  Patient Name:  Jennifer Toussaint    :  1980  MRN: [id number]  Physician: KYA Ponce CNP                                 Insurance: [facility] (30 visits)  Medical Diagnosis: G57.72 (ICD-10-CM) - Complex regional pain syndrome type 2 of left lower extremity                   Rehab Codes: Left lower extremity pain M79.662  Onset date:  2020                      Next 's appt. : 2023  Visit# / total visits:      Cancels/No Shows: 0/0    Subjective:    Pain:  [x] Yes  [] No Location: LLE Pain Rating: (0-10 scale) 6/10  Pain altered Tx:  [] No  [x] Yes  Action: desensitization,   Comments: The patient had an appointment with Dr. Tommy Perez. Who recommended use of his AFO as well as PT for his calf tightness and foot pain. He has a follow up with pain management on  to discuss further treatment options. Objective:  Modalities:   Precautions: CRPS, foot drop left  Exercises:  Exercise Reps/ Time  Weight/ Level Comments   nustep 5 min x Level 4    Bike  5 min x     Supine/long seated       Quad set 10x      Hip abduction 2x10      TA isos  2x10      TA March 10 ea      Hamstring set.         Manual 3 min x  desensitization   SLR 10      Hip extension 2x10      Knee curl 2x10      Hip

## 2023-05-26 ENCOUNTER — OFFICE VISIT (OUTPATIENT)
Dept: PAIN MANAGEMENT | Age: 43
End: 2023-05-26
Payer: MEDICAID

## 2023-05-26 VITALS — WEIGHT: 249 LBS | HEIGHT: 71 IN | BODY MASS INDEX: 34.86 KG/M2

## 2023-05-26 DIAGNOSIS — G89.29 OTHER CHRONIC PAIN: ICD-10-CM

## 2023-05-26 DIAGNOSIS — F32.A DEPRESSION, UNSPECIFIED DEPRESSION TYPE: ICD-10-CM

## 2023-05-26 DIAGNOSIS — F41.9 ANXIETY: ICD-10-CM

## 2023-05-26 DIAGNOSIS — G57.72 COMPLEX REGIONAL PAIN SYNDROME TYPE 2 OF LEFT LOWER EXTREMITY: ICD-10-CM
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
## 2023-05-26 PROCEDURE — 99213 OFFICE O/P EST LOW 20 MIN: CPT | Performed by: NURSE PRACTITIONER

## 2023-05-26 PROCEDURE — 4004F PT TOBACCO SCREEN RCVD TLK: CPT | Performed by: NURSE PRACTITIONER

## 2023-05-26 PROCEDURE — G8427 DOCREV CUR MEDS BY ELIG CLIN: HCPCS | Performed by: NURSE PRACTITIONER

## 2023-05-26 PROCEDURE — G8417 CALC BMI ABV UP PARAM F/U: HCPCS | Performed by: NURSE PRACTITIONER

## 2023-05-26 RX ORDER — DULOXETIN HYDROCHLORIDE 60 MG/1
CAPSULE, DELAYED RELEASE ORAL
Qty: 90 CAPSULE | Refills: 1 | Status: SHIPPED | OUTPATIENT
Start: 2023-05-26

## 2023-05-26 RX ORDER — CYCLOBENZAPRINE HCL 10 MG
TABLET ORAL
Qty: 30 TABLET | Refills: 0 | OUTPATIENT
Start: 2023-05-26

## 2023-05-26 RX ORDER — CYCLOBENZAPRINE HCL 10 MG
10 TABLET ORAL 2 TIMES DAILY PRN
Qty: 60 TABLET | Refills: 1 | Status: SHIPPED | OUTPATIENT
Start: 2023-05-26 | End: 2023-06-25

## 2023-05-26 RX ORDER — GABAPENTIN 600 MG/1
600 TABLET ORAL 3 TIMES DAILY
Qty: 90 TABLET | Refills: 1 | Status: SHIPPED | OUTPATIENT
Start: 2023-05-26 | End: 2023-06-25

## 2023-05-26 ASSESSMENT — ENCOUNTER SYMPTOMS
SHORTNESS OF BREATH: 0
COUGH: 0
CONSTIPATION: 0
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
## 2023-05-30 ENCOUNTER — HOSPITAL ENCOUNTER (OUTPATIENT)
Dept: PHYSICAL THERAPY | Facility: CLINIC | Age: 43
Setting detail: THERAPIES SERIES
Discharge: HOME | End: 2023-05-30
Payer: MEDICAID

## 2023-05-30 PROCEDURE — 97113 AQUATIC THERAPY/EXERCISES: CPT

## 2023-05-30 NOTE — FLOWSHEET NOTE
[x] [facility]  [address]  P: [phone]  F: [phone]     Physical Therapy Daily  Aquatic Treatment Note    Date:  2023  Patient Name:  Edward Monroy  \"Abdulaziz\"  :  1980  MRN: [id number]  Physician: KYA Eric - ESTUARDO                                 Insurance: [facility] (30 visits)  Medical Diagnosis: G57.72 (ICD-10-CM) - Complex regional pain syndrome type 2 of left lower extremity                   Rehab Codes: Left lower extremity pain M79.662  Onset date:  2020                      Next Dr's appt. : 2023  Visit# / total visits:                                   Cancels/No Shows: 0/0    Subjective:    Pain:  [x] Yes  [] No Location: (L) LE Pain Rating: (0-10 scale) 7/10  Pain altered Tx:  [] No  [] Yes  Action:  Comments: Pt reports with desensitization his pain can inc to \"10/10\" but his baseline is 7/10.      Objective:  Precautions: CRPS, foot drop left     KEY  B = Belt G = Gloves N = Noodle   C = Cuffs K = Kickboard P = Paddles   CC = Cervical Collar L = Laps T = Theratube   DB = Dumbells M = Minutes W = Weights     Exercises/Activities  Warm-up/Amb 30 Dynamic Exercises    Forward 3L N March    Sideways 3L Squat    Backwards 3L N Retro HS curls      Retro SLR    Stretches  Braiding    Gastroc/Soleus 3x30\" Heel to Toe amb    Hamstring  Toe amb    Hip flexor  Heel amb    Piriformis      SKTC      Pec Stretch      Post Deltoid  Static Exercises UE      Shoulder flex/ext    Static Exercises LE  Shoulder abd/add    Heel/toe raises 20 ea Shoulder H.  abd/add    Marches  Shoulder IR/ER    Mini-squats  Rowing    4-way hip   Arm Circles    Hamstring curls  UT shrugs/rolls    Hip Circles/Fig 8  Scap squeezes    Ankle ROM  Diagonals 1/2    Toe yoga 20  Elbow flex/ext      Pron/Sup    Functional Exercise  Wrist AROM    Step      Wall Push-ups  Deep H20/    SLS  Bike    Breast Stroke on Noodle

## 2023-06-01 ENCOUNTER — HOSPITAL ENCOUNTER (OUTPATIENT)
Dept: PHYSICAL THERAPY | Facility: CLINIC | Age: 43
Setting detail: THERAPIES SERIES
Discharge: HOME | End: 2023-06-01
Payer: MEDICAID

## 2023-06-01 PROCEDURE — 97113 AQUATIC THERAPY/EXERCISES: CPT

## 2023-06-01 NOTE — FLOWSHEET NOTE
Wall Push-ups  Deep H20/    SLS  Bike    Breast Stroke on Noodle  Hip abd/add    Noodle Twist  Hip flex/ext    Noodle Push down  Hip IR/ER    Kickboard push/pull  Knee flex/ext      Push/pull on [employer] 10m end of rail   Other:    Specific Instructions for next treatment:    Treatment Charges: Mins Units   []  Modalities     []  Ther Exercise     []  Manual Therapy     []  Ther Activities     [x]  Aquatics 25 2   []  Other       Assessment:    [] Progressing toward goals. [] No change. [x] Other: Cont to have pt complete warm up amb in ~5 ft depth at end of railing to dec WB and pain levels. Initiated with retro walking as pt tolerates this best, working into lateral then forward laps with rest breaks prn. Some mm cramping in back of arch towards heel, instructed in self-STM with pain behavior initially with some relief. Added more stability focused LE/UE ex with fair tolerance. Concluded with supine float to reduce soreness, will cont to monitor and progress as tolerated. STG: (to be met in 6 treatments)  ? Pain: 5/10. Not Met  Patient to be independent with home exercise program as demonstrated by performance with correct form without cues. Met     LTG: (to be met in 16 treatments)  Patient will be able to tolerate wearing an AFO to improve gait and activity tolerance. Not Met  Patient will ambulate without an assistive device around the house. Not Met  Patient will perform light house work without assistance. Not Met  Patient will improve LEFS score by > 10%. Not Assessed       Pt. Education:  [x] Yes  [] No  [] Reviewed Prior HEP/Ed  Method of Education: [x] Verbal  [x] Demo  [] Written  Comprehension of Education:  [x] Verbalizes understanding. [x] Demonstrates understanding. [] Needs review. [] Demonstrates/verbalizes HEP/Ed previously given. Plan: [x] Continue per plan of care.    [] Other:      Time In: 9:55 am            Time Out: 10:40 am    Electronically signed by:  Antoine Melendez

## 2023-06-06 ENCOUNTER — HOSPITAL ENCOUNTER (OUTPATIENT)
Dept: PHYSICAL THERAPY | Facility: CLINIC | Age: 43
Setting detail: THERAPIES SERIES
Discharge: HOME | End: 2023-06-06
Payer: MEDICAID

## 2023-06-06 PROCEDURE — 97113 AQUATIC THERAPY/EXERCISES: CPT

## 2023-06-08 ENCOUNTER — APPOINTMENT (OUTPATIENT)
Dept: PHYSICAL THERAPY | Facility: CLINIC | Age: 43
End: 2023-06-08
Payer: MEDICAID

## 2023-06-19 ENCOUNTER — OFFICE VISIT (OUTPATIENT)
Dept: PODIATRY | Age: 43
End: 2023-06-19
Payer: MEDICAID

## 2023-06-19 VITALS — HEIGHT: 71 IN | BODY MASS INDEX: 34.86 KG/M2 | WEIGHT: 249 LBS

## 2023-06-19 DIAGNOSIS — M54.17 LUMBOSACRAL RADICULOPATHY: ICD-10-CM

## 2023-06-19 DIAGNOSIS — M79.672 PAIN IN LEFT FOOT: ICD-10-CM

## 2023-06-19 DIAGNOSIS — M24.572 EQUINUS CONTRACTURE OF LEFT ANKLE: ICD-10-CM

## 2023-06-19 DIAGNOSIS — M21.372 FOOT DROP, LEFT FOOT: Primary | ICD-10-CM

## 2023-06-19 PROCEDURE — 99213 OFFICE O/P EST LOW 20 MIN: CPT | Performed by: PODIATRIST

## 2023-06-19 PROCEDURE — 4004F PT TOBACCO SCREEN RCVD TLK: CPT | Performed by: PODIATRIST

## 2023-06-19 PROCEDURE — G8427 DOCREV CUR MEDS BY ELIG CLIN: HCPCS | Performed by: PODIATRIST

## 2023-06-19 PROCEDURE — G8417 CALC BMI ABV UP PARAM F/U: HCPCS | Performed by: PODIATRIST

## 2023-06-19 ASSESSMENT — ENCOUNTER SYMPTOMS
NAUSEA: 0
SHORTNESS OF BREATH: 0
BACK PAIN: 0
COLOR CHANGE: 0
DIARRHEA: 0

## 2023-06-19 NOTE — PROGRESS NOTES
[facility] Pod[facility]  Return Patient Progress Note    Subjective: Zachery Bell 43 y.o. male that presents for follow up evaluation of drop foot left. Chief Complaint   Patient presents with    Foot Pain     Left foot and ankle, still having the heel and arch pain     Patient's treatment thus far has included physical therapy. Pain is rated 8 out of 10 and is described as intermittent. Patient has been following my prescribed course of therapy as instructed. Patient states that he feels physical therapy is helping, but he is still having pain. Patient relates his pain as tingling and burning, especially down the left lower extremity. Patient states that he is getting a brace for his dropfoot, but he has not gotten that yet. Review of Systems   Constitutional:  Negative for activity change, appetite change, chills, diaphoresis, fatigue and fever. Respiratory:  Negative for shortness of breath. Cardiovascular:  Negative for leg swelling. Gastrointestinal:  Negative for diarrhea and nausea. Endocrine: Negative for cold intolerance, heat intolerance and polyuria. Musculoskeletal:  Positive for gait problem. Negative for arthralgias, back pain, joint swelling and myalgias. Skin:  Negative for color change, pallor, rash and wound. Allergic/Immunologic: Negative for environmental allergies and food allergies. Neurological:  Negative for dizziness, weakness, light-headedness and numbness. Hematological:  Does not bruise/bleed easily. Psychiatric/Behavioral:  Negative for behavioral problems, confusion and self-injury. The patient is not nervous/anxious. Objective: Clinical evaluation of the patient reveals continued absence of muscle strength to the left lower extremity anterior muscle group. There remains soft tissue resistance upon passive dorsiflexion of the bilateral ankle with the knee extended and flexed, left greater than right.   However, this has improved in severity since last

## 2023-06-20 ENCOUNTER — HOSPITAL ENCOUNTER (OUTPATIENT)
Dept: PHYSICAL THERAPY | Facility: CLINIC | Age: 43
Setting detail: THERAPIES SERIES
Discharge: HOME | End: 2023-06-20
Payer: MEDICAID

## 2023-06-20 PROCEDURE — 97113 AQUATIC THERAPY/EXERCISES: CPT

## 2023-06-20 NOTE — FLOWSHEET NOTE
[x] [facility]  [address]  P: [phone]  F: [phone]     Physical Therapy Daily  Aquatic Treatment Note    Date:  2023  Patient Name:  Darleen Haywood  \"Abdulaziz\"  :  1980  MRN: [id number]  Physician: KYA Byrd - ESTUARDO                                 Insurance: [facility] (30 visits)  Medical Diagnosis: G57.72 (ICD-10-CM) - Complex regional pain syndrome type 2 of left lower extremity                   Rehab Codes: Left lower extremity pain M79.662  Onset date:  2020                      Next Dr's appt.: 2023  Visit# / total visits:                                  Cancels/No Shows: 0/0    Subjective:    Pain:  [x] Yes  [] No Location: (L) LE Pain Rating: (0-10 scale) 710  Pain altered Tx:  [] No  [] Yes  Action:  Comments: Pt reports he is going to neurology  per recent appt with foot  Concerned about possible sciatic nerve involvement. Reports he is usually sore after therapy.      Objective:  Precautions: CRPS, foot drop left     KEY  B = Belt G = Gloves N = Noodle   C = Cuffs K = Kickboard P = Paddles   CC = Cervical Collar L = Laps T = Theratube   DB = Dumbells M = Minutes W = Weights     Exercises/Activities  Warm-up/Amb  Dynamic Exercises    Forward 3L - complete last   March    Sideways 3L  Squat    Backwards 3L - complete first  Retro HS curls      Retro SLR    Stretches  Braiding    Gastroc/Soleus 3x30\" Heel to Toe amb    Hamstring  Toe amb    Hip flexor  Heel amb    Piriformis      SKTC      Pec Stretch      Post Deltoid  Static Exercises UE Wide stance     Shoulder flex/ext 10   Static Exercises LE  Shoulder abd/add    Heel/toe raises 10 Shoulder H.  abd/add    Marches 10 Shoulder IR/ER    Mini-squats  Rowing    4-way hip   Arm Circles    Hamstring curls 10 UT shrugs/rolls    Hip Circles/Fig 8  Scap squeezes    Ankle ROM   Diagonals 1/2    Ankle BAPS 10 ea  Elbow

## 2023-06-26 NOTE — TELEPHONE ENCOUNTER
LOV  01/27/22  RTO not scheduled  LRF 1/25/23          Controlled Substance Monitoring:    Acute and Chronic Pain Monitoring:   RX Monitoring 9/4/2015   Attestation The Prescription Monitoring Report for this patient was reviewed today. Periodic Controlled Substance Monitoring Possible medication side effects, risk of tolerance and/or dependence, and alternative treatments discussed; No signs of potential drug abuse or diversion identified.

## 2023-06-27 ENCOUNTER — HOSPITAL ENCOUNTER (OUTPATIENT)
Dept: PHYSICAL THERAPY | Facility: CLINIC | Age: 43
Setting detail: THERAPIES SERIES
Discharge: HOME | End: 2023-06-27
Payer: MEDICAID

## 2023-06-27 PROCEDURE — 97113 AQUATIC THERAPY/EXERCISES: CPT

## 2023-06-27 RX ORDER — VALACYCLOVIR HYDROCHLORIDE 500 MG/1
TABLET, FILM COATED ORAL
Qty: 30 TABLET | Refills: 0 | Status: SHIPPED | OUTPATIENT
Start: 2023-06-27 | End: 2023-08-03

## 2023-07-17 ENCOUNTER — OFFICE VISIT (OUTPATIENT)
Dept: PODIATRY | Age: 43
End: 2023-07-17
Payer: MEDICAID

## 2023-07-17 VITALS — HEIGHT: 71 IN | BODY MASS INDEX: 34.86 KG/M2 | WEIGHT: 249 LBS

## 2023-07-17 DIAGNOSIS — M24.572 EQUINUS CONTRACTURE OF LEFT ANKLE: ICD-10-CM

## 2023-07-17 DIAGNOSIS — M79.672 PAIN IN LEFT FOOT: ICD-10-CM

## 2023-07-17 DIAGNOSIS — M54.17 LUMBOSACRAL RADICULOPATHY: ICD-10-CM

## 2023-07-17 DIAGNOSIS — M25.472 PAIN AND SWELLING OF LEFT ANKLE: ICD-10-CM

## 2023-07-17 DIAGNOSIS — M25.572 PAIN AND SWELLING OF LEFT ANKLE: ICD-10-CM

## 2023-07-17 DIAGNOSIS — M21.372 FOOT DROP, LEFT FOOT: Primary | ICD-10-CM

## 2023-07-17 PROCEDURE — 99213 OFFICE O/P EST LOW 20 MIN: CPT | Performed by: PODIATRIST

## 2023-07-17 PROCEDURE — G8427 DOCREV CUR MEDS BY ELIG CLIN: HCPCS | Performed by: PODIATRIST

## 2023-07-17 PROCEDURE — 4004F PT TOBACCO SCREEN RCVD TLK: CPT | Performed by: PODIATRIST

## 2023-07-17 PROCEDURE — G8417 CALC BMI ABV UP PARAM F/U: HCPCS | Performed by: PODIATRIST

## 2023-07-17 ASSESSMENT — ENCOUNTER SYMPTOMS
DIARRHEA: 0
NAUSEA: 0
BACK PAIN: 0
COLOR CHANGE: 0
SHORTNESS OF BREATH: 0

## 2023-07-17 NOTE — PROGRESS NOTES
[address] [facility] Podiatry  Return Patient Progress Note    Subjective: Mireya Bell 43 y.o. male that presents for follow up evaluation of dropfoot left. Chief Complaint   Patient presents with    Foot Pain     Left foot and ankle still having pain     Patient's treatment thus far has included AFO. Pain is rated 8 out of 10 and is described as intermittent. Patient has been following my prescribed course of therapy as instructed. Patient states that his AFO is too big to get into any of his shoes. Therefore, patient does not use the AFOs much as he would like. Patient states that he contacted the company that made the brace for him and that they are unable to see him for a couple of months. Review of Systems   Constitutional:  Negative for activity change, appetite change, chills, diaphoresis, fatigue and fever. Respiratory:  Negative for shortness of breath. Cardiovascular:  Negative for leg swelling. Gastrointestinal:  Negative for diarrhea and nausea. Endocrine: Negative for cold intolerance, heat intolerance and polyuria. Musculoskeletal:  Positive for gait problem. Negative for arthralgias, back pain, joint swelling and myalgias. Skin:  Negative for color change, pallor, rash and wound. Allergic/Immunologic: Negative for environmental allergies and food allergies. Neurological:  Negative for dizziness, weakness, light-headedness and numbness. Hematological:  Does not bruise/bleed easily. Psychiatric/Behavioral:  Negative for behavioral problems, confusion and self-injury. The patient is not nervous/anxious. Objective: Clinical evaluation of the patient reveals continued absence of muscle strength to the left lower extremity anterior muscle group. There remains soft tissue resistance upon passive dorsiflexion of the bilateral ankle with the knee extended and flexed, left greater than right. This is not changed since last visit.   The patient's AFO is too large for his foot and

## 2023-07-21 ENCOUNTER — OFFICE VISIT (OUTPATIENT)
Dept: PAIN MANAGEMENT | Age: 43
End: 2023-07-21

## 2023-07-21 VITALS — HEIGHT: 71 IN | BODY MASS INDEX: 34.3 KG/M2 | WEIGHT: 245 LBS

## 2023-07-21 DIAGNOSIS — G89.29 OTHER CHRONIC PAIN: ICD-10-CM
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
## 2023-07-21 DIAGNOSIS — G57.72 COMPLEX REGIONAL PAIN SYNDROME TYPE 2 OF LEFT LOWER EXTREMITY: ICD-10-CM

## 2023-07-21 RX ORDER — CYCLOBENZAPRINE HCL 10 MG
10 TABLET ORAL 2 TIMES DAILY PRN
Qty: 60 TABLET | Refills: 2 | Status: SHIPPED | OUTPATIENT
Start: 2023-07-21 | End: 2023-10-19

## 2023-07-21 RX ORDER — GABAPENTIN 600 MG/1
600 TABLET ORAL 3 TIMES DAILY
Qty: 90 TABLET | Refills: 2 | Status: SHIPPED | OUTPATIENT
Start: 2023-07-21 | End: 2023-10-19

## 2023-07-21 ASSESSMENT — ENCOUNTER SYMPTOMS
COUGH: 0
SHORTNESS OF BREATH: 0
CONSTIPATION: 0
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
## 2023-08-03 RX ORDER — VALACYCLOVIR HYDROCHLORIDE 500 MG/1
TABLET, FILM COATED ORAL
Qty: 30 TABLET | Refills: 2 | Status: SHIPPED | OUTPATIENT
Start: 2023-08-03 | End: 2023-08-29

## 2023-08-24 ENCOUNTER — OFFICE VISIT (OUTPATIENT)
Dept: NEUROSURGERY | Age: 43
End: 2023-08-24
Payer: MEDICAID

## 2023-08-24 ENCOUNTER — TELEPHONE (OUTPATIENT)
Dept: FAMILY MEDICINE CLINIC | Age: 43
End: 2023-08-24

## 2023-08-24 VITALS
OXYGEN SATURATION: 96 % | HEART RATE: 61 BPM | WEIGHT: 245 LBS | HEIGHT: 71 IN | BODY MASS INDEX: 34.3 KG/M2 | DIASTOLIC BLOOD PRESSURE: 75 MMHG | TEMPERATURE: 99 F | SYSTOLIC BLOOD PRESSURE: 113 MMHG

## 2023-08-24 DIAGNOSIS — G57.72 COMPLEX REGIONAL PAIN SYNDROME TYPE 2 OF LEFT LOWER EXTREMITY: Primary | ICD-10-CM

## 2023-08-24 DIAGNOSIS — M21.372 LEFT FOOT DROP: ICD-10-CM

## 2023-08-24 PROCEDURE — 99204 OFFICE O/P NEW MOD 45 MIN: CPT | Performed by: NURSE PRACTITIONER

## 2023-08-24 PROCEDURE — 4004F PT TOBACCO SCREEN RCVD TLK: CPT | Performed by: NURSE PRACTITIONER

## 2023-08-24 PROCEDURE — G8427 DOCREV CUR MEDS BY ELIG CLIN: HCPCS | Performed by: NURSE PRACTITIONER

## 2023-08-24 PROCEDURE — G8417 CALC BMI ABV UP PARAM F/U: HCPCS | Performed by: NURSE PRACTITIONER
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
## 2023-08-24 NOTE — TELEPHONE ENCOUNTER
Pt saw Endy Carrillo today and needs a follow up appt with Dr. Tye Field. \"Follow up Dr Tye Field in 6-8 weeks for eval for DRG stim\". Pt prefers to come to the [facility] office but will go to [facility] if he has to. There is no availability in either office currently. Please advise where pt can be scheduled.

## 2023-08-29 ENCOUNTER — OFFICE VISIT (OUTPATIENT)
Dept: FAMILY MEDICINE CLINIC | Age: 43
End: 2023-08-29
Payer: MEDICAID

## 2023-08-29 VITALS
SYSTOLIC BLOOD PRESSURE: 150 MMHG | HEART RATE: 101 BPM | BODY MASS INDEX: 35.28 KG/M2 | HEIGHT: 71 IN | WEIGHT: 252 LBS | DIASTOLIC BLOOD PRESSURE: 90 MMHG | OXYGEN SATURATION: 98 %

## 2023-08-29 DIAGNOSIS — G89.29 OTHER CHRONIC PAIN: ICD-10-CM

## 2023-08-29 DIAGNOSIS — F43.23 ADJUSTMENT DISORDER WITH MIXED ANXIETY AND DEPRESSED MOOD: ICD-10-CM
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
## 2023-08-29 DIAGNOSIS — F41.9 ANXIETY: ICD-10-CM

## 2023-08-29 DIAGNOSIS — G57.72 COMPLEX REGIONAL PAIN SYNDROME TYPE 2 OF LEFT LOWER EXTREMITY: Primary | ICD-10-CM

## 2023-08-29 DIAGNOSIS — F90.2 ATTENTION DEFICIT HYPERACTIVITY DISORDER (ADHD), COMBINED TYPE: ICD-10-CM

## 2023-08-29 DIAGNOSIS — A60.01 HERPES SIMPLEX INFECTION OF PENIS: ICD-10-CM

## 2023-08-29 DIAGNOSIS — M21.372 LEFT FOOT DROP: ICD-10-CM

## 2023-08-29 DIAGNOSIS — F32.A DEPRESSION, UNSPECIFIED DEPRESSION TYPE: ICD-10-CM

## 2023-08-29 PROBLEM — S00.03XA SCALP HEMATOMA: Status: RESOLVED | Noted: 2020-07-29 | Resolved: 2023-08-29

## 2023-08-29 PROCEDURE — G8427 DOCREV CUR MEDS BY ELIG CLIN: HCPCS

## 2023-08-29 PROCEDURE — 99215 OFFICE O/P EST HI 40 MIN: CPT

## 2023-08-29 PROCEDURE — G2212 PROLONG OUTPT/OFFICE VIS: HCPCS

## 2023-08-29 PROCEDURE — G8417 CALC BMI ABV UP PARAM F/U: HCPCS

## 2023-08-29 PROCEDURE — 4004F PT TOBACCO SCREEN RCVD TLK: CPT

## 2023-08-29 RX ORDER — VALACYCLOVIR HYDROCHLORIDE 500 MG/1
500 TABLET, FILM COATED ORAL DAILY PRN
Qty: 30 TABLET | Refills: 3 | Status: SHIPPED | OUTPATIENT
Start: 2023-08-29 | End: 2024-08-28

## 2023-08-29 RX ORDER — MELOXICAM 7.5 MG/1
7.5 TABLET ORAL DAILY PRN
Qty: 90 TABLET | Refills: 1 | Status: SHIPPED | OUTPATIENT
Start: 2023-08-29 | End: 2024-08-28

## 2023-08-29 RX ORDER — CYCLOBENZAPRINE HCL 10 MG
10 TABLET ORAL 2 TIMES DAILY PRN
Qty: 60 TABLET | Refills: 2 | Status: CANCELLED | OUTPATIENT
Start: 2023-08-29 | End: 2023-11-27

## 2023-08-29 RX ORDER — DULOXETIN HYDROCHLORIDE 60 MG/1
60 CAPSULE, DELAYED RELEASE ORAL DAILY
Qty: 90 CAPSULE | Refills: 1 | Status: SHIPPED | OUTPATIENT
Start: 2023-08-29 | End: 2024-08-28

## 2023-08-29 ASSESSMENT — ENCOUNTER SYMPTOMS
SORE THROAT: 0
VOMITING: 0
SHORTNESS OF BREATH: 0
EYE PAIN: 0
CONSTIPATION: 0
EYE DISCHARGE: 0
BACK PAIN: 1
ABDOMINAL PAIN: 0
APNEA: 0
COUGH: 0
NAUSEA: 0
DIARRHEA: 0
ABDOMINAL DISTENTION: 0
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
## 2023-09-12 ENCOUNTER — HOSPITAL ENCOUNTER (OUTPATIENT)
Dept: MRI IMAGING | Age: 43
Discharge: HOME | End: 2023-09-14
Payer: MEDICAID

## 2023-09-12 DIAGNOSIS — G57.72 COMPLEX REGIONAL PAIN SYNDROME TYPE 2 OF LEFT LOWER EXTREMITY: ICD-10-CM

## 2023-09-12 DIAGNOSIS — M21.372 LEFT FOOT DROP: ICD-10-CM

## 2023-09-12 PROCEDURE — 72148 MRI LUMBAR SPINE W/O DYE: CPT

## 2023-10-10 ENCOUNTER — OFFICE VISIT (OUTPATIENT)
Dept: FAMILY MEDICINE CLINIC | Age: 43
End: 2023-10-10

## 2023-10-10 VITALS
DIASTOLIC BLOOD PRESSURE: 80 MMHG | HEIGHT: 71 IN | BODY MASS INDEX: 35.28 KG/M2 | WEIGHT: 252 LBS | SYSTOLIC BLOOD PRESSURE: 126 MMHG | HEART RATE: 113 BPM | TEMPERATURE: 98.3 F | OXYGEN SATURATION: 97 %

## 2023-10-10 DIAGNOSIS — F41.9 ANXIETY: ICD-10-CM

## 2023-10-10 DIAGNOSIS — Z23 NEED FOR TDAP VACCINATION: ICD-10-CM

## 2023-10-10 DIAGNOSIS — K58.9 SYMPTOMS CONSISTENT WITH IRRITABLE BOWEL SYNDROME: ICD-10-CM

## 2023-10-10 DIAGNOSIS — G89.29 OTHER CHRONIC PAIN: ICD-10-CM

## 2023-10-10 DIAGNOSIS — G57.72 COMPLEX REGIONAL PAIN SYNDROME TYPE 2 OF LEFT LOWER EXTREMITY: ICD-10-CM

## 2023-10-10 DIAGNOSIS — R00.0 TACHYCARDIA: ICD-10-CM

## 2023-10-10 DIAGNOSIS — K21.00 GASTROESOPHAGEAL REFLUX DISEASE WITH ESOPHAGITIS WITHOUT HEMORRHAGE: Primary | ICD-10-CM

## 2023-10-10 DIAGNOSIS — F32.A DEPRESSION, UNSPECIFIED DEPRESSION TYPE: ICD-10-CM
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
## 2023-10-10 DIAGNOSIS — M19.90 ARTHRITIS PAIN: ICD-10-CM

## 2023-10-10 RX ORDER — METOPROLOL SUCCINATE 25 MG/1
25 TABLET, EXTENDED RELEASE ORAL DAILY
Qty: 90 TABLET | Refills: 1 | Status: SHIPPED | OUTPATIENT
Start: 2023-10-10

## 2023-10-10 RX ORDER — MELOXICAM 15 MG/1
15 TABLET ORAL DAILY
Qty: 30 TABLET | Refills: 3 | Status: SHIPPED | OUTPATIENT
Start: 2023-10-10

## 2023-10-10 RX ORDER — OMEPRAZOLE 20 MG/1
20 CAPSULE, DELAYED RELEASE ORAL
Qty: 90 CAPSULE | Refills: 1 | Status: SHIPPED | OUTPATIENT
Start: 2023-10-10

## 2023-10-10 RX ORDER — DULOXETIN HYDROCHLORIDE 30 MG/1
30 CAPSULE, DELAYED RELEASE ORAL DAILY
COMMUNITY

## 2023-10-10 RX ORDER — GABAPENTIN 600 MG/1
600 TABLET ORAL 3 TIMES DAILY
Qty: 90 TABLET | Refills: 2 | Status: CANCELLED | OUTPATIENT
Start: 2023-10-10 | End: 2024-01-08

## 2023-10-10 ASSESSMENT — ENCOUNTER SYMPTOMS
DIARRHEA: 1
NAUSEA: 0
CONSTIPATION: 1

## 2023-10-10 ASSESSMENT — PATIENT HEALTH QUESTIONNAIRE - PHQ9
7. TROUBLE CONCENTRATING ON THINGS, SUCH AS READING THE NEWSPAPER OR WATCHING TELEVISION: 1
9. THOUGHTS THAT YOU WOULD BE BETTER OFF DEAD, OR OF HURTING YOURSELF: 0
SUM OF ALL RESPONSES TO PHQ QUESTIONS 1-9: 5
3. TROUBLE FALLING OR STAYING ASLEEP: 2
SUM OF ALL RESPONSES TO PHQ QUESTIONS 1-9: 5
8. MOVING OR SPEAKING SO SLOWLY THAT OTHER PEOPLE COULD HAVE NOTICED. OR THE OPPOSITE, BEING SO FIGETY OR RESTLESS THAT YOU HAVE BEEN MOVING AROUND A LOT MORE THAN USUAL: 0
1. LITTLE INTEREST OR PLEASURE IN DOING THINGS: 0
5. POOR APPETITE OR OVEREATING: 1
2. FEELING DOWN, DEPRESSED OR HOPELESS: 1
10. IF YOU CHECKED OFF ANY PROBLEMS, HOW DIFFICULT HAVE THESE PROBLEMS MADE IT FOR YOU TO DO YOUR WORK, TAKE CARE OF THINGS AT HOME, OR GET ALONG WITH OTHER PEOPLE: 1
SUM OF ALL RESPONSES TO PHQ9 QUESTIONS 1 & 2: 1
6. FEELING BAD ABOUT YOURSELF - OR THAT YOU ARE A FAILURE OR HAVE LET YOURSELF OR YOUR FAMILY DOWN: 0
SUM OF ALL RESPONSES TO PHQ QUESTIONS 1-9: 5
SUM OF ALL RESPONSES TO PHQ QUESTIONS 1-9: 5
4. FEELING TIRED OR HAVING LITTLE ENERGY: 0

## 2023-10-10 ASSESSMENT — ANXIETY QUESTIONNAIRES
3. WORRYING TOO MUCH ABOUT DIFFERENT THINGS: 2
6. BECOMING EASILY ANNOYED OR IRRITABLE: 2
2. NOT BEING ABLE TO STOP OR CONTROL WORRYING: 2
4. TROUBLE RELAXING: 2
5. BEING SO RESTLESS THAT IT IS HARD TO SIT STILL: 2
IF YOU CHECKED OFF ANY PROBLEMS ON THIS QUESTIONNAIRE, HOW DIFFICULT HAVE THESE PROBLEMS MADE IT FOR YOU TO DO YOUR WORK, TAKE CARE OF THINGS AT HOME, OR GET ALONG WITH OTHER PEOPLE: SOMEWHAT DIFFICULT
7. FEELING AFRAID AS IF SOMETHING AWFUL MIGHT HAPPEN: 2
GAD7 TOTAL SCORE: 14
1. FEELING NERVOUS, ANXIOUS, OR ON EDGE: 2
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
## 2023-10-15 ASSESSMENT — ENCOUNTER SYMPTOMS
ABDOMINAL DISTENTION: 0
COUGH: 0
BLOOD IN STOOL: 0
SHORTNESS OF BREATH: 0
APNEA: 0
VOMITING: 0
EYE PAIN: 0
SORE THROAT: 0
EYE DISCHARGE: 0
ABDOMINAL PAIN: 1
BACK PAIN: 1

## 2023-10-16 ENCOUNTER — OFFICE VISIT (OUTPATIENT)
Dept: PODIATRY | Age: 43
End: 2023-10-16
Payer: MEDICAID

## 2023-10-16 VITALS — BODY MASS INDEX: 35.28 KG/M2 | WEIGHT: 252 LBS | HEIGHT: 71 IN

## 2023-10-16 DIAGNOSIS — M24.572 EQUINUS CONTRACTURE OF LEFT ANKLE: ICD-10-CM

## 2023-10-16 DIAGNOSIS — M79.672 PAIN IN LEFT FOOT: ICD-10-CM

## 2023-10-16 DIAGNOSIS — M25.472 PAIN AND SWELLING OF LEFT ANKLE: ICD-10-CM

## 2023-10-16 DIAGNOSIS — M21.372 FOOT DROP, LEFT FOOT: Primary | ICD-10-CM

## 2023-10-16 DIAGNOSIS — M25.572 PAIN AND SWELLING OF LEFT ANKLE: ICD-10-CM

## 2023-10-16 PROCEDURE — G8484 FLU IMMUNIZE NO ADMIN: HCPCS | Performed by: PODIATRIST

## 2023-10-16 PROCEDURE — G8427 DOCREV CUR MEDS BY ELIG CLIN: HCPCS | Performed by: PODIATRIST

## 2023-10-16 PROCEDURE — 4004F PT TOBACCO SCREEN RCVD TLK: CPT | Performed by: PODIATRIST

## 2023-10-16 PROCEDURE — G8417 CALC BMI ABV UP PARAM F/U: HCPCS | Performed by: PODIATRIST

## 2023-10-16 PROCEDURE — 99213 OFFICE O/P EST LOW 20 MIN: CPT | Performed by: PODIATRIST

## 2023-10-16 ASSESSMENT — ENCOUNTER SYMPTOMS
NAUSEA: 0
SHORTNESS OF BREATH: 0
BACK PAIN: 0
DIARRHEA: 0
COLOR CHANGE: 0

## 2023-10-16 NOTE — PROGRESS NOTES
[id number] [facility]  Return Patient Progress Note    Subjective: Jacky Bell 43 y.o. male that presents for follow up evaluation of drop foot   Chief Complaint   Patient presents with    Foot Pain     Left foot,  readjusted brace     Patient's treatment thus far has included AFO with modifications. Pain is rated 8 out of 10 and is described as intermittent. Patient has been following my prescribed course of therapy as instructed. Patient states that the orthotic company did not help him much with modifying his AFO and it is still on wearable and uncomfortable. Review of Systems   Constitutional:  Negative for activity change, appetite change, chills, diaphoresis, fatigue and fever. Respiratory:  Negative for shortness of breath. Cardiovascular:  Negative for leg swelling. Gastrointestinal:  Negative for diarrhea and nausea. Endocrine: Negative for cold intolerance, heat intolerance and polyuria. Musculoskeletal:  Positive for gait problem. Negative for arthralgias, back pain, joint swelling and myalgias. Skin:  Negative for color change, pallor, rash and wound. Allergic/Immunologic: Negative for environmental allergies and food allergies. Neurological:  Negative for dizziness, weakness, light-headedness and numbness. Hematological:  Does not bruise/bleed easily. Psychiatric/Behavioral:  Negative for behavioral problems, confusion and self-injury. The patient is not nervous/anxious. Objective: Clinical evaluation of the patient reveals continued absence of muscle strength to the left lower extremity anterior muscle group. There remains soft tissue resistance upon passive dorsiflexion of the bilateral ankle with the knee extended and flexed, left greater than right. This is again unchanged since last visit. The patient's AFO remains too large for his foot and ankle. There is far too much space medially and laterally to the foot and leg.   Also, there is no padding

## 2023-10-20 ENCOUNTER — OFFICE VISIT (OUTPATIENT)
Dept: PAIN MANAGEMENT | Age: 43
End: 2023-10-20
Payer: MEDICAID

## 2023-10-20 VITALS — BODY MASS INDEX: 35.28 KG/M2 | HEIGHT: 71 IN | WEIGHT: 252 LBS

## 2023-10-20 DIAGNOSIS — G89.29 OTHER CHRONIC PAIN: ICD-10-CM

## 2023-10-20 DIAGNOSIS — G57.72 COMPLEX REGIONAL PAIN SYNDROME TYPE 2 OF LEFT LOWER EXTREMITY: ICD-10-CM

## 2023-10-20 PROCEDURE — 99213 OFFICE O/P EST LOW 20 MIN: CPT | Performed by: NURSE PRACTITIONER

## 2023-10-20 PROCEDURE — G8417 CALC BMI ABV UP PARAM F/U: HCPCS | Performed by: NURSE PRACTITIONER

## 2023-10-20 PROCEDURE — G8484 FLU IMMUNIZE NO ADMIN: HCPCS | Performed by: NURSE PRACTITIONER

## 2023-10-20 PROCEDURE — 4004F PT TOBACCO SCREEN RCVD TLK: CPT | Performed by: NURSE PRACTITIONER

## 2023-10-20 PROCEDURE — G8427 DOCREV CUR MEDS BY ELIG CLIN: HCPCS | Performed by: NURSE PRACTITIONER

## 2023-10-20 RX ORDER — GABAPENTIN 600 MG/1
600 TABLET ORAL 3 TIMES DAILY
Qty: 90 TABLET | Refills: 2 | Status: SHIPPED | OUTPATIENT
Start: 2023-10-20 | End: 2024-01-18

## 2023-10-20 ASSESSMENT — ENCOUNTER SYMPTOMS
COUGH: 0
SHORTNESS OF BREATH: 0
CONSTIPATION: 0
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
## 2023-10-26 ENCOUNTER — OFFICE VISIT (OUTPATIENT)
Dept: NEUROSURGERY | Age: 43
End: 2023-10-26
Payer: MEDICAID

## 2023-10-26 VITALS
OXYGEN SATURATION: 98 % | HEART RATE: 122 BPM | HEIGHT: 71 IN | BODY MASS INDEX: 35.28 KG/M2 | TEMPERATURE: 98.4 F | WEIGHT: 252 LBS | SYSTOLIC BLOOD PRESSURE: 128 MMHG | DIASTOLIC BLOOD PRESSURE: 84 MMHG

## 2023-10-26 DIAGNOSIS — M25.572 CHRONIC PAIN OF LEFT ANKLE: ICD-10-CM

## 2023-10-26 DIAGNOSIS — G89.29 CHRONIC PAIN OF LEFT ANKLE: ICD-10-CM

## 2023-10-26 DIAGNOSIS — G57.32 PERONEAL NEUROPATHY AT KNEE, LEFT: ICD-10-CM

## 2023-10-26 DIAGNOSIS — M21.372 LEFT FOOT DROP: ICD-10-CM

## 2023-10-26 DIAGNOSIS — G57.72 COMPLEX REGIONAL PAIN SYNDROME TYPE 2 OF LEFT LOWER EXTREMITY: Primary | ICD-10-CM

## 2023-10-26 PROCEDURE — G8427 DOCREV CUR MEDS BY ELIG CLIN: HCPCS | Performed by: NEUROLOGICAL SURGERY

## 2023-10-26 PROCEDURE — G8484 FLU IMMUNIZE NO ADMIN: HCPCS | Performed by: NEUROLOGICAL SURGERY

## 2023-10-26 PROCEDURE — G8417 CALC BMI ABV UP PARAM F/U: HCPCS | Performed by: NEUROLOGICAL SURGERY

## 2023-10-26 PROCEDURE — 99204 OFFICE O/P NEW MOD 45 MIN: CPT | Performed by: NEUROLOGICAL SURGERY

## 2023-10-26 PROCEDURE — 4004F PT TOBACCO SCREEN RCVD TLK: CPT | Performed by: NEUROLOGICAL SURGERY

## 2023-10-26 NOTE — PROGRESS NOTES
voice recognition software. There may be inaccuracies of transcription  that are inadvertently overlooked prior to the signature. There is any questions about the transcription please contact me.

## 2023-11-08 ENCOUNTER — HOSPITAL ENCOUNTER (OUTPATIENT)
Dept: PHYSICAL THERAPY | Facility: CLINIC | Age: 43
Setting detail: THERAPIES SERIES
Discharge: HOME | End: 2023-11-08
Payer: MEDICAID

## 2023-11-08 PROCEDURE — 97110 THERAPEUTIC EXERCISES: CPT

## 2023-11-08 PROCEDURE — 97162 PT EVAL MOD COMPLEX 30 MIN: CPT

## 2023-11-08 NOTE — CONSULTS
[] [address]  [address] [facility].  [phone]  F: [phone] [] [address] [facility]  [address]  P: [phone]  F: [phone] [] [address]  [address] [facility]  P: [phone]  F: [phone] [x] Tavo Ivelisse: [phone]  F: [phone] [] [address] [facility]   P: [phone]  F: [phone]  [] [address] [facility].   P: [phone]  F: [phone] [] [address] [facility]  [address]  P: [phone]  F: [phone] [] [place]  [address] [facility]  [place]: [phone]  F: [phone] [] [address] [facility]  [address]  [place]: [phone]  F: [phone]  [] [facility] &  Therapy  [address]  [place]: [phone]  F: [phone]     Physical Therapy Lower Extremity Evaluation    Date:  2023  Patient: Sandie Peters  : 1980  MRN: [id number]  Physician: David Murphy DPM     Insurance: [facility]; Yoan yr; 30/16vs; Laura Julisa after 30vs; no pt resp  Medical Diagnosis:   M21.372 (ICD-10-CM) - Foot drop, left foot   M24.572 (ICD-10-CM) - Equinus contracture of left ankle   M79.672 (ICD-10-CM) - Pain in left foot   M25.572, M25.472 (ICD-10-CM) - Pain and swelling of left ankle   Rehab Codes:   M21.372 (ICD-10-CM) - Foot drop, left foot   M25.572, M25.472 (ICD-10-CM) - Pain and swelling of left ankle     Onset date: 2020  Next 's appt. Michelle Hutton

## 2023-11-13 ENCOUNTER — HOSPITAL ENCOUNTER (OUTPATIENT)
Dept: NEUROLOGY | Age: 43
Discharge: HOME | End: 2023-11-13
Payer: MEDICAID

## 2023-11-13 DIAGNOSIS — M21.372 LEFT FOOT DROP: ICD-10-CM

## 2023-11-13 PROCEDURE — 95886 MUSC TEST DONE W/N TEST COMP: CPT | Performed by: PHYSICAL MEDICINE & REHABILITATION

## 2023-11-13 PROCEDURE — 95911 NRV CNDJ TEST 9-10 STUDIES: CPT | Performed by: PHYSICAL MEDICINE & REHABILITATION

## 2023-11-17 DIAGNOSIS — G57.32 PERONEAL NEUROPATHY AT KNEE, LEFT: Primary | ICD-10-CM

## 2023-12-11 RX ORDER — CYCLOBENZAPRINE HCL 10 MG
TABLET ORAL
Qty: 60 TABLET | Refills: 1 | OUTPATIENT
Start: 2023-12-11

## 2023-12-12 ENCOUNTER — HOSPITAL ENCOUNTER (OUTPATIENT)
Dept: MRI IMAGING | Age: 43
Discharge: HOME | End: 2023-12-14
Attending: NEUROLOGICAL SURGERY
Payer: MEDICAID

## 2023-12-12 DIAGNOSIS — G57.32 PERONEAL NEUROPATHY AT KNEE, LEFT: ICD-10-CM

## 2023-12-12 PROCEDURE — 73723 MRI JOINT LWR EXTR W/O&W/DYE: CPT

## 2023-12-12 PROCEDURE — A9579 GAD-BASE MR CONTRAST NOS,1ML: HCPCS | Performed by: NEUROLOGICAL SURGERY

## 2023-12-12 PROCEDURE — 6360000004 HC RX CONTRAST MEDICATION: Performed by: NEUROLOGICAL SURGERY

## 2023-12-12 RX ADMIN — GADOTERIDOL 20 ML: 279.3 INJECTION, SOLUTION INTRAVENOUS at 18:36

## 2024-01-02 RX ORDER — CYCLOBENZAPRINE HCL 10 MG
TABLET ORAL
Qty: 60 TABLET | Refills: 1 | OUTPATIENT
Start: 2024-01-02

## 2024-01-03 ENCOUNTER — TELEPHONE (OUTPATIENT)
Dept: NEUROSURGERY | Age: 44
End: 2024-01-03

## 2024-01-03 DIAGNOSIS — G57.72 COMPLEX REGIONAL PAIN SYNDROME TYPE 2 OF LEFT LOWER EXTREMITY: ICD-10-CM

## 2024-01-03 DIAGNOSIS — G57.32 PERONEAL NEUROPATHY AT KNEE, LEFT: Primary | ICD-10-CM

## 2024-01-03 DIAGNOSIS — S83.105S LEFT KNEE DISLOCATION, SEQUELA: ICD-10-CM

## 2024-01-03 NOTE — TELEPHONE ENCOUNTER
----- Message from KYA Hopper CNP sent at 1/2/2024  8:45 AM EST -----  Dr Marino has recommended patient see ortho, please see if he would like to follow up with established ortho or if new referral is preferred. Thanks  ----- Message -----  From: Jamila Marino DO  Sent: 12/26/2023   8:27 AM EST  To: KYA Hopper CNP    Can you refer him to ortho ? He had prior surgeries with them. Has CRPS and peroneal neuropathy. Need to see if anything needs to be done from their perspective

## 2024-01-04 ENCOUNTER — TELEPHONE (OUTPATIENT)
Dept: NEUROSURGERY | Age: 44
End: 2024-01-04

## 2024-01-08 SDOH — ECONOMIC STABILITY: INCOME INSECURITY: HOW HARD IS IT FOR YOU TO PAY FOR THE VERY BASICS LIKE FOOD, HOUSING, MEDICAL CARE, AND HEATING?: VERY HARD

## 2024-01-08 SDOH — ECONOMIC STABILITY: HOUSING INSECURITY
IN THE LAST 12 MONTHS, WAS THERE A TIME WHEN YOU DID NOT HAVE A STEADY PLACE TO SLEEP OR SLEPT IN A SHELTER (INCLUDING NOW)?: NO

## 2024-01-08 SDOH — ECONOMIC STABILITY: FOOD INSECURITY: WITHIN THE PAST 12 MONTHS, YOU WORRIED THAT YOUR FOOD WOULD RUN OUT BEFORE YOU GOT MONEY TO BUY MORE.: SOMETIMES TRUE

## 2024-01-08 SDOH — ECONOMIC STABILITY: TRANSPORTATION INSECURITY
IN THE PAST 12 MONTHS, HAS LACK OF TRANSPORTATION KEPT YOU FROM MEETINGS, WORK, OR FROM GETTING THINGS NEEDED FOR DAILY LIVING?: YES

## 2024-01-08 SDOH — ECONOMIC STABILITY: FOOD INSECURITY: WITHIN THE PAST 12 MONTHS, THE FOOD YOU BOUGHT JUST DIDN'T LAST AND YOU DIDN'T HAVE MONEY TO GET MORE.: SOMETIMES TRUE

## 2024-01-11 ENCOUNTER — OFFICE VISIT (OUTPATIENT)
Dept: FAMILY MEDICINE CLINIC | Age: 44
End: 2024-01-11
Payer: MEDICAID

## 2024-01-11 ENCOUNTER — HOSPITAL ENCOUNTER (OUTPATIENT)
Age: 44
Setting detail: SPECIMEN
Discharge: HOME | End: 2024-01-11

## 2024-01-11 VITALS
OXYGEN SATURATION: 98 % | TEMPERATURE: 98 F | WEIGHT: 259 LBS | DIASTOLIC BLOOD PRESSURE: 84 MMHG | SYSTOLIC BLOOD PRESSURE: 132 MMHG | HEART RATE: 84 BPM | BODY MASS INDEX: 36.12 KG/M2

## 2024-01-11 DIAGNOSIS — Z01.89 ROUTINE LAB DRAW: ICD-10-CM

## 2024-01-11 DIAGNOSIS — G89.29 CHRONIC RIGHT LOWER QUADRANT PAIN: ICD-10-CM

## 2024-01-11 DIAGNOSIS — R39.11 URINARY HESITANCY: ICD-10-CM

## 2024-01-11 DIAGNOSIS — Z13.220 LIPID SCREENING: ICD-10-CM

## 2024-01-11 DIAGNOSIS — K21.00 GASTROESOPHAGEAL REFLUX DISEASE WITH ESOPHAGITIS WITHOUT HEMORRHAGE: ICD-10-CM

## 2024-01-11 DIAGNOSIS — R10.31 CHRONIC RIGHT LOWER QUADRANT PAIN: ICD-10-CM

## 2024-01-11 DIAGNOSIS — R00.0 TACHYCARDIA: ICD-10-CM

## 2024-01-11 DIAGNOSIS — R30.0 DYSURIA: ICD-10-CM

## 2024-01-11 DIAGNOSIS — N30.01 ACUTE CYSTITIS WITH HEMATURIA: ICD-10-CM

## 2024-01-11 LAB
ALBUMIN SERPL-MCNC: 4.3 G/DL (ref 3.5–5.2)
ALBUMIN/GLOB SERPL: 1.5 {RATIO} (ref 1–2.5)
ALP SERPL-CCNC: 95 U/L (ref 40–129)
ALT SERPL-CCNC: 17 U/L (ref 5–41)
ANION GAP SERPL CALCULATED.3IONS-SCNC: 11 MMOL/L (ref 9–17)
AST SERPL-CCNC: 16 U/L
BASOPHILS # BLD: 0.06 K/UL (ref 0–0.2)
BASOPHILS NFR BLD: 1 % (ref 0–2)
BILIRUB SERPL-MCNC: 0.4 MG/DL (ref 0.3–1.2)
BILIRUBIN, POC: NORMAL
BLOOD URINE, POC: NORMAL
BUN SERPL-MCNC: 15 MG/DL (ref 6–20)
CALCIUM SERPL-MCNC: 8.9 MG/DL (ref 8.6–10.4)
CHLORIDE SERPL-SCNC: 101 MMOL/L (ref 98–107)
CHOLEST SERPL-MCNC: 177 MG/DL
CHOLESTEROL/HDL RATIO: 3
CLARITY, POC: CLEAR
CO2 SERPL-SCNC: 26 MMOL/L (ref 20–31)
COLOR, POC: YELLOW
CREAT SERPL-MCNC: 0.8 MG/DL (ref 0.7–1.2)
EOSINOPHIL # BLD: 0.1 K/UL (ref 0–0.44)
EOSINOPHILS RELATIVE PERCENT: 1 % (ref 1–4)
ERYTHROCYTE [DISTWIDTH] IN BLOOD BY AUTOMATED COUNT: 13.7 % (ref 11.8–14.4)
GFR SERPL CREATININE-BSD FRML MDRD: >60 ML/MIN/1.73M2
GLUCOSE SERPL-MCNC: 111 MG/DL (ref 70–99)
GLUCOSE URINE, POC: NORMAL
HCT VFR BLD AUTO: 46 % (ref 40.7–50.3)
HDLC SERPL-MCNC: 60 MG/DL
HGB BLD-MCNC: 15.6 G/DL (ref 13–17)
IMM GRANULOCYTES # BLD AUTO: 0.07 K/UL (ref 0–0.3)
IMM GRANULOCYTES NFR BLD: 1 %
KETONES, POC: NORMAL
LDLC SERPL CALC-MCNC: 106 MG/DL (ref 0–130)
LEUKOCYTE EST, POC: NORMAL
LYMPHOCYTES NFR BLD: 2.53 K/UL (ref 1.1–3.7)
LYMPHOCYTES RELATIVE PERCENT: 20 % (ref 24–43)
MCH RBC QN AUTO: 33 PG (ref 25.2–33.5)
MCHC RBC AUTO-ENTMCNC: 33.9 G/DL (ref 28.4–34.8)
MCV RBC AUTO: 97.3 FL (ref 82.6–102.9)
MONOCYTES NFR BLD: 1 K/UL (ref 0.1–1.2)
MONOCYTES NFR BLD: 8 % (ref 3–12)
NEUTROPHILS NFR BLD: 69 % (ref 36–65)
NEUTS SEG NFR BLD: 9.16 K/UL (ref 1.5–8.1)
NITRITE, POC: NORMAL
NRBC BLD-RTO: 0 PER 100 WBC
PH, POC: 6.5
PLATELET # BLD AUTO: 238 K/UL (ref 138–453)
PMV BLD AUTO: 10.6 FL (ref 8.1–13.5)
POTASSIUM SERPL-SCNC: 4.8 MMOL/L (ref 3.7–5.3)
PROT SERPL-MCNC: 7.1 G/DL (ref 6.4–8.3)
PROTEIN, POC: NORMAL
PSA SERPL-MCNC: 2.49 NG/ML
RBC # BLD AUTO: 4.73 M/UL (ref 4.21–5.77)
SODIUM SERPL-SCNC: 138 MMOL/L (ref 135–144)
SPECIFIC GRAVITY, POC: 1.02
TRIGL SERPL-MCNC: 57 MG/DL
UROBILINOGEN, POC: 0.2
WBC OTHER # BLD: 12.9 K/UL (ref 3.5–11.3)

## 2024-01-11 PROCEDURE — 99214 OFFICE O/P EST MOD 30 MIN: CPT

## 2024-01-11 PROCEDURE — G8484 FLU IMMUNIZE NO ADMIN: HCPCS

## 2024-01-11 PROCEDURE — 4004F PT TOBACCO SCREEN RCVD TLK: CPT

## 2024-01-11 PROCEDURE — 81003 URINALYSIS AUTO W/O SCOPE: CPT

## 2024-01-11 PROCEDURE — G8417 CALC BMI ABV UP PARAM F/U: HCPCS

## 2024-01-11 PROCEDURE — G8427 DOCREV CUR MEDS BY ELIG CLIN: HCPCS

## 2024-01-11 RX ORDER — OMEPRAZOLE 20 MG/1
20 CAPSULE, DELAYED RELEASE ORAL
Qty: 90 CAPSULE | Refills: 1 | Status: SHIPPED | OUTPATIENT
Start: 2024-01-11

## 2024-01-11 RX ORDER — METOPROLOL SUCCINATE 25 MG/1
25 TABLET, EXTENDED RELEASE ORAL DAILY
Qty: 90 TABLET | Refills: 1 | Status: SHIPPED | OUTPATIENT
Start: 2024-01-11

## 2024-01-11 ASSESSMENT — PATIENT HEALTH QUESTIONNAIRE - PHQ9
6. FEELING BAD ABOUT YOURSELF - OR THAT YOU ARE A FAILURE OR HAVE LET YOURSELF OR YOUR FAMILY DOWN: 1
SUM OF ALL RESPONSES TO PHQ QUESTIONS 1-9: 5
SUM OF ALL RESPONSES TO PHQ QUESTIONS 1-9: 5
4. FEELING TIRED OR HAVING LITTLE ENERGY: 0
1. LITTLE INTEREST OR PLEASURE IN DOING THINGS: 0
9. THOUGHTS THAT YOU WOULD BE BETTER OFF DEAD, OR OF HURTING YOURSELF: 0
SUM OF ALL RESPONSES TO PHQ QUESTIONS 1-9: 5
8. MOVING OR SPEAKING SO SLOWLY THAT OTHER PEOPLE COULD HAVE NOTICED. OR THE OPPOSITE, BEING SO FIGETY OR RESTLESS THAT YOU HAVE BEEN MOVING AROUND A LOT MORE THAN USUAL: 1
3. TROUBLE FALLING OR STAYING ASLEEP: 1
7. TROUBLE CONCENTRATING ON THINGS, SUCH AS READING THE NEWSPAPER OR WATCHING TELEVISION: 0
5. POOR APPETITE OR OVEREATING: 1
2. FEELING DOWN, DEPRESSED OR HOPELESS: 1
SUM OF ALL RESPONSES TO PHQ QUESTIONS 1-9: 5
SUM OF ALL RESPONSES TO PHQ9 QUESTIONS 1 & 2: 1
10. IF YOU CHECKED OFF ANY PROBLEMS, HOW DIFFICULT HAVE THESE PROBLEMS MADE IT FOR YOU TO DO YOUR WORK, TAKE CARE OF THINGS AT HOME, OR GET ALONG WITH OTHER PEOPLE: 0

## 2024-01-11 NOTE — PROGRESS NOTES
Xander Bell (:  1980) is a 43 y.o. male,Established patient, here for evaluation of the following chief complaint(s):  Medication Refill and Tachycardia         ASSESSMENT/PLAN:  1. Routine lab draw  -     CBC with Auto Differential; Future  -     Comprehensive Metabolic Panel; Future  2. Dysuria  -     POCT Urinalysis No Micro (Auto)  -     Chlamydia/GC DNA, Urine; Future  -     Culture, Urine; Future  3. Urinary hesitancy  -     POCT Urinalysis No Micro (Auto)  -     Chlamydia/GC DNA, Urine; Future  -     PSA, Diagnostic; Future  -     Culture, Urine; Future  4. Lipid screening  -     Lipid Panel; Future  5. Tachycardia  -     metoprolol succinate (TOPROL XL) 25 MG extended release tablet; Take 1 tablet by mouth daily, Disp-90 tablet, R-1Normal  6. Gastroesophageal reflux disease with esophagitis without hemorrhage  -     omeprazole (PRILOSEC) 20 MG delayed release capsule; Take 1 capsule by mouth every morning (before breakfast), Disp-90 capsule, R-1Normal  7. Chronic right lower quadrant pain  -     CT ABDOMEN PELVIS WO CONTRAST Additional Contrast? Radiologist Recommendation; Future  8. Acute cystitis with hematuria  -     nitrofurantoin, macrocrystal-monohydrate, (MACROBID) 100 MG capsule; Take 1 capsule by mouth 2 times daily for 10 days, Disp-20 capsule, R-0Normal    POC reviewed- will start oral ABX  Given chronicity of abdominal pain- will complete CT   Continue mediations as prescribed   Close follow up with specialist     Return in about 6 months (around 2024) for chronic conditions .         Subjective   SUBJECTIVE/OBJECTIVE:  Patient presents today for follow up on his tachycardia.  His HR is WNL today.     He is also having increased urinary symptoms and burning when he urinates and or ejaculates. He states his sexual partner was recently treated for a UTI.  He also states his right testicle is uncomfortable at times. He has had these symptoms off and on for a month.     He

## 2024-01-12 LAB
MICROORGANISM SPEC CULT: NO GROWTH
SPECIMEN DESCRIPTION: NORMAL

## 2024-01-12 RX ORDER — NITROFURANTOIN 25; 75 MG/1; MG/1
100 CAPSULE ORAL 2 TIMES DAILY
Qty: 20 CAPSULE | Refills: 0 | Status: SHIPPED | OUTPATIENT
Start: 2024-01-12 | End: 2024-01-22

## 2024-01-12 NOTE — RESULT ENCOUNTER NOTE
Please advise patient that his labs are within normal limits.  He does have an elevated white blood cell count, which is likely due to his current suspected urinary tract infection.  His urine has been sent for urine culture.  This will be to double check and make sure that the antibiotic prescribed to treat the infection.

## 2024-01-13 LAB
CHLAMYDIA DNA UR QL NAA+PROBE: NEGATIVE
N GONORRHOEA DNA UR QL NAA+PROBE: NEGATIVE
SPECIMEN DESCRIPTION: NORMAL

## 2024-01-15 ENCOUNTER — OFFICE VISIT (OUTPATIENT)
Dept: PODIATRY | Age: 44
End: 2024-01-15
Payer: MEDICAID

## 2024-01-15 VITALS — WEIGHT: 259 LBS | HEIGHT: 71 IN | BODY MASS INDEX: 36.26 KG/M2

## 2024-01-15 DIAGNOSIS — M25.572 PAIN AND SWELLING OF LEFT ANKLE: ICD-10-CM

## 2024-01-15 DIAGNOSIS — M24.572 EQUINUS CONTRACTURE OF LEFT ANKLE: ICD-10-CM

## 2024-01-15 DIAGNOSIS — M54.17 LUMBOSACRAL RADICULOPATHY: ICD-10-CM

## 2024-01-15 DIAGNOSIS — M79.672 PAIN IN LEFT FOOT: ICD-10-CM

## 2024-01-15 DIAGNOSIS — M25.472 PAIN AND SWELLING OF LEFT ANKLE: ICD-10-CM

## 2024-01-15 DIAGNOSIS — M21.372 FOOT DROP, LEFT FOOT: Primary | ICD-10-CM

## 2024-01-15 PROCEDURE — G8427 DOCREV CUR MEDS BY ELIG CLIN: HCPCS | Performed by: PODIATRIST

## 2024-01-15 PROCEDURE — 99213 OFFICE O/P EST LOW 20 MIN: CPT | Performed by: PODIATRIST

## 2024-01-15 PROCEDURE — G8484 FLU IMMUNIZE NO ADMIN: HCPCS | Performed by: PODIATRIST

## 2024-01-15 PROCEDURE — G8417 CALC BMI ABV UP PARAM F/U: HCPCS | Performed by: PODIATRIST

## 2024-01-15 PROCEDURE — 4004F PT TOBACCO SCREEN RCVD TLK: CPT | Performed by: PODIATRIST

## 2024-01-15 ASSESSMENT — ENCOUNTER SYMPTOMS
DIARRHEA: 0
SHORTNESS OF BREATH: 0
COLOR CHANGE: 0
NAUSEA: 0
BACK PAIN: 1

## 2024-01-15 NOTE — PROGRESS NOTES
[facility]  Return Patient Progress Note    Subjective: Xander Bell 43 y.o. male that presents for follow up evaluation of drop foot to the left foot.   Chief Complaint   Patient presents with    Foot Pain     Left foot, worse because of winter     Patient's treatment thus far has included referred to  for an adjustment to his AFO. Patient states that he went to  to have this done, but now it doesn't fit in his shoes. Patient states that he has not yet gotten a shoe to fit his brace. Patient states that he is still getting pain to his left ankle as well as to the heel and arch of the foot. Patient states that his symptoms are worse in the cold weather. Patient states that he gets numbness and tingling to the left foot.  Pain is rated 9 out of 10 and is described as intermittent.  Patient states that he has seen the neurosurgeon and there are plans to perform decompression of a nerve in the leg.    Review of Systems   Constitutional:  Negative for activity change, appetite change, chills, diaphoresis, fatigue and fever.   Respiratory:  Negative for shortness of breath.    Cardiovascular:  Negative for leg swelling.   Gastrointestinal:  Negative for diarrhea and nausea.   Endocrine: Negative for cold intolerance, heat intolerance and polyuria.   Musculoskeletal:  Positive for back pain and gait problem. Negative for arthralgias, joint swelling and myalgias.   Skin:  Negative for color change, pallor, rash and wound.   Allergic/Immunologic: Negative for environmental allergies and food allergies.   Neurological:  Negative for dizziness, weakness, light-headedness and numbness.   Hematological:  Does not bruise/bleed easily.   Psychiatric/Behavioral:  Negative for behavioral problems, confusion and self-injury. The patient is not nervous/anxious.        Objective: Clinical evaluation of the patient reveals continued absence of muscle strength to the left lower extremity anterior muscle group.

## 2024-01-18 ENCOUNTER — OFFICE VISIT (OUTPATIENT)
Dept: ORTHOPEDIC SURGERY | Age: 44
End: 2024-01-18

## 2024-01-18 VITALS — HEIGHT: 71 IN | BODY MASS INDEX: 36.26 KG/M2 | WEIGHT: 259 LBS | RESPIRATION RATE: 14 BRPM

## 2024-01-18 DIAGNOSIS — M21.372 LEFT FOOT DROP: ICD-10-CM

## 2024-01-18 DIAGNOSIS — M25.562 LEFT KNEE PAIN, UNSPECIFIED CHRONICITY: Primary | ICD-10-CM

## 2024-01-18 SDOH — HEALTH STABILITY: PHYSICAL HEALTH: ON AVERAGE, HOW MANY DAYS PER WEEK DO YOU ENGAGE IN MODERATE TO STRENUOUS EXERCISE (LIKE A BRISK WALK)?: 1 DAY

## 2024-01-19 ENCOUNTER — HOSPITAL ENCOUNTER (OUTPATIENT)
Dept: CT IMAGING | Age: 44
End: 2024-01-19
Payer: MEDICAID

## 2024-01-19 ENCOUNTER — OFFICE VISIT (OUTPATIENT)
Dept: PAIN MANAGEMENT | Age: 44
End: 2024-01-19
Payer: MEDICAID

## 2024-01-19 VITALS — WEIGHT: 259 LBS | BODY MASS INDEX: 36.26 KG/M2 | HEIGHT: 71 IN

## 2024-01-19 DIAGNOSIS — G89.29 CHRONIC RIGHT LOWER QUADRANT PAIN: ICD-10-CM

## 2024-01-19 DIAGNOSIS — G57.72 COMPLEX REGIONAL PAIN SYNDROME TYPE 2 OF LEFT LOWER EXTREMITY: ICD-10-CM

## 2024-01-19 DIAGNOSIS — G89.29 OTHER CHRONIC PAIN: ICD-10-CM

## 2024-01-19 DIAGNOSIS — R10.31 CHRONIC RIGHT LOWER QUADRANT PAIN: ICD-10-CM

## 2024-01-19 PROCEDURE — G8484 FLU IMMUNIZE NO ADMIN: HCPCS | Performed by: NURSE PRACTITIONER

## 2024-01-19 PROCEDURE — G8427 DOCREV CUR MEDS BY ELIG CLIN: HCPCS | Performed by: NURSE PRACTITIONER

## 2024-01-19 PROCEDURE — 4004F PT TOBACCO SCREEN RCVD TLK: CPT | Performed by: NURSE PRACTITIONER

## 2024-01-19 PROCEDURE — G8417 CALC BMI ABV UP PARAM F/U: HCPCS | Performed by: NURSE PRACTITIONER

## 2024-01-19 PROCEDURE — 99213 OFFICE O/P EST LOW 20 MIN: CPT | Performed by: NURSE PRACTITIONER

## 2024-01-19 PROCEDURE — 74176 CT ABD & PELVIS W/O CONTRAST: CPT

## 2024-01-19 RX ORDER — GABAPENTIN 600 MG/1
600 TABLET ORAL 3 TIMES DAILY
Qty: 90 TABLET | Refills: 2 | Status: SHIPPED | OUTPATIENT
Start: 2024-01-19 | End: 2024-04-18

## 2024-01-19 ASSESSMENT — ENCOUNTER SYMPTOMS
ROS SKIN COMMENTS: NEGATIVE FOR RASH
CONSTIPATION: 0
SHORTNESS OF BREATH: 0
SHORTNESS OF BREATH: 0
BACK PAIN: 1
EYE DISCHARGE: 0
COUGH: 0
ABDOMINAL PAIN: 0
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
## 2024-01-28 DIAGNOSIS — K40.90 RIGHT INGUINAL HERNIA: Primary | ICD-10-CM

## 2024-02-06 ENCOUNTER — HOSPITAL ENCOUNTER (OUTPATIENT)
Dept: SURGERY | Age: 44
Discharge: HOME | End: 2024-02-06
Payer: MEDICAID

## 2024-02-06 VITALS
WEIGHT: 259 LBS | DIASTOLIC BLOOD PRESSURE: 78 MMHG | HEART RATE: 105 BPM | BODY MASS INDEX: 36.26 KG/M2 | HEIGHT: 71 IN | OXYGEN SATURATION: 98 % | SYSTOLIC BLOOD PRESSURE: 111 MMHG

## 2024-02-06 DIAGNOSIS — Z87.19 HISTORY OF RIGHT INGUINAL HERNIA: ICD-10-CM

## 2024-02-06 DIAGNOSIS — R10.9 RIGHT LATERAL ABDOMINAL PAIN: Primary | ICD-10-CM

## 2024-02-06 PROCEDURE — 99202 OFFICE O/P NEW SF 15 MIN: CPT

## 2024-02-06 PROCEDURE — 99203 OFFICE O/P NEW LOW 30 MIN: CPT | Performed by: SURGERY
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
## 2024-03-04 ENCOUNTER — OFFICE VISIT (OUTPATIENT)
Dept: PAIN MANAGEMENT | Age: 44
End: 2024-03-04
Payer: MEDICAID

## 2024-03-04 VITALS — HEIGHT: 71 IN | WEIGHT: 259 LBS | BODY MASS INDEX: 36.26 KG/M2

## 2024-03-04 DIAGNOSIS — M19.90 ARTHRITIS PAIN: ICD-10-CM

## 2024-03-04 DIAGNOSIS — G89.29 OTHER CHRONIC PAIN: ICD-10-CM

## 2024-03-04 DIAGNOSIS — G57.72 COMPLEX REGIONAL PAIN SYNDROME TYPE 2 OF LEFT LOWER EXTREMITY: ICD-10-CM

## 2024-03-04 PROCEDURE — 99213 OFFICE O/P EST LOW 20 MIN: CPT | Performed by: NURSE PRACTITIONER

## 2024-03-04 PROCEDURE — G8484 FLU IMMUNIZE NO ADMIN: HCPCS | Performed by: NURSE PRACTITIONER

## 2024-03-04 PROCEDURE — 4004F PT TOBACCO SCREEN RCVD TLK: CPT | Performed by: NURSE PRACTITIONER

## 2024-03-04 PROCEDURE — G8427 DOCREV CUR MEDS BY ELIG CLIN: HCPCS | Performed by: NURSE PRACTITIONER

## 2024-03-04 PROCEDURE — G8417 CALC BMI ABV UP PARAM F/U: HCPCS | Performed by: NURSE PRACTITIONER

## 2024-03-04 RX ORDER — CYCLOBENZAPRINE HCL 10 MG
10 TABLET ORAL 2 TIMES DAILY PRN
Qty: 60 TABLET | Refills: 2 | Status: SHIPPED | OUTPATIENT
Start: 2024-03-04 | End: 2024-06-02

## 2024-03-04 RX ORDER — GABAPENTIN 600 MG/1
600 TABLET ORAL 3 TIMES DAILY
Qty: 90 TABLET | Refills: 2 | Status: SHIPPED | OUTPATIENT
Start: 2024-03-04 | End: 2024-06-02

## 2024-03-04 ASSESSMENT — ENCOUNTER SYMPTOMS
SHORTNESS OF BREATH: 0
COUGH: 0
BACK PAIN: 1
CONSTIPATION: 0

## 2024-03-04 NOTE — PROGRESS NOTES
mouth daily, Disp: 30 tablet, Rfl: 3    valACYclovir (VALTREX) 500 MG tablet, Take 1 tablet by mouth daily as needed (onset of lesions), Disp: 30 tablet, Rfl: 3    DULoxetine (CYMBALTA) 60 MG extended release capsule, Take 1 capsule by mouth daily, Disp: 90 capsule, Rfl: 1    Gabapentin POWD, , Disp: , Rfl:     lidocaine (LIDODERM) 5 %, 5 patches, Disp: , Rfl:     Family History   Problem Relation Age of Onset    Diabetes Father     High Blood Pressure Father     High Blood Pressure Paternal Grandmother     Diabetes Paternal Grandfather        Social History     Socioeconomic History    Marital status: Single     Spouse name: Not on file    Number of children: Not on file    Years of education: Not on file    Highest education level: Not on file   Occupational History    Occupation: Construction, brick   Tobacco Use    Smoking status: Every Day     Current packs/day: 1.00     Average packs/day: 1 pack/day for 25.0 years (25.0 ttl pk-yrs)     Types: Cigarettes    Smokeless tobacco: Never   Substance and Sexual Activity    Alcohol use: Yes     Comment: 6 pack of beer per day    Drug use: Yes     Frequency: 1.0 times per week     Types: Cocaine, Marijuana (Weed)     Comment: Occasionally    Sexual activity: Yes     Partners: Female     Comment: same partner 1 year   Other Topics Concern    Not on file   Social History Narrative    ** Merged History Encounter **          Social Determinants of Health     Financial Resource Strain: Low Risk  (8/25/2022)    Overall Financial Resource Strain (CARDIA)     Difficulty of Paying Living Expenses: Not hard at all   Food Insecurity: Not on file (1/8/2024)   Transportation Needs: Not on file   Physical Activity: Unknown (1/18/2024)    Exercise Vital Sign     Days of Exercise per Week: 1 day     Minutes of Exercise per Session: Not on file   Stress: Not on file   Social Connections: Not on file   Intimate Partner Violence: Not on file   Housing Stability: Not on file       Review
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
## 2024-03-05 RX ORDER — MELOXICAM 15 MG/1
15 TABLET ORAL DAILY
Qty: 30 TABLET | Refills: 3 | Status: SHIPPED | OUTPATIENT
Start: 2024-03-05

## 2024-04-04 ENCOUNTER — OFFICE VISIT (OUTPATIENT)
Dept: NEUROSURGERY | Age: 44
End: 2024-04-04
Payer: MEDICAID

## 2024-04-04 VITALS
SYSTOLIC BLOOD PRESSURE: 122 MMHG | WEIGHT: 254 LBS | HEIGHT: 71 IN | BODY MASS INDEX: 35.56 KG/M2 | DIASTOLIC BLOOD PRESSURE: 81 MMHG | HEART RATE: 120 BPM

## 2024-04-04 DIAGNOSIS — G57.72 COMPLEX REGIONAL PAIN SYNDROME TYPE 2 OF LEFT LOWER EXTREMITY: ICD-10-CM

## 2024-04-04 DIAGNOSIS — G89.29 CHRONIC PAIN OF LEFT ANKLE: ICD-10-CM

## 2024-04-04 DIAGNOSIS — M25.572 CHRONIC PAIN OF LEFT ANKLE: ICD-10-CM

## 2024-04-04 DIAGNOSIS — M21.372 LEFT FOOT DROP: ICD-10-CM

## 2024-04-04 DIAGNOSIS — G57.32 PERONEAL NEUROPATHY AT KNEE, LEFT: Primary | ICD-10-CM

## 2024-04-04 PROCEDURE — G8427 DOCREV CUR MEDS BY ELIG CLIN: HCPCS | Performed by: NEUROLOGICAL SURGERY

## 2024-04-04 PROCEDURE — 99214 OFFICE O/P EST MOD 30 MIN: CPT | Performed by: NEUROLOGICAL SURGERY

## 2024-04-04 PROCEDURE — 4004F PT TOBACCO SCREEN RCVD TLK: CPT | Performed by: NEUROLOGICAL SURGERY

## 2024-04-04 PROCEDURE — G8417 CALC BMI ABV UP PARAM F/U: HCPCS | Performed by: NEUROLOGICAL SURGERY

## 2024-04-04 RX ORDER — IBUPROFEN 800 MG/1
800 TABLET ORAL EVERY 6 HOURS PRN
COMMUNITY
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
## 2024-04-09 ENCOUNTER — OFFICE VISIT (OUTPATIENT)
Dept: PAIN MANAGEMENT | Age: 44
End: 2024-04-09
Payer: MEDICAID

## 2024-04-09 VITALS — HEIGHT: 71 IN | BODY MASS INDEX: 35.56 KG/M2 | WEIGHT: 254 LBS

## 2024-04-09 DIAGNOSIS — G57.72 COMPLEX REGIONAL PAIN SYNDROME TYPE 2 OF LEFT LOWER EXTREMITY: Primary | ICD-10-CM

## 2024-04-09 DIAGNOSIS — M21.372 LEFT FOOT DROP: Primary | ICD-10-CM

## 2024-04-09 DIAGNOSIS — G89.29 OTHER CHRONIC PAIN: ICD-10-CM

## 2024-04-09 PROCEDURE — 99213 OFFICE O/P EST LOW 20 MIN: CPT | Performed by: PAIN MEDICINE

## 2024-04-09 PROCEDURE — 4004F PT TOBACCO SCREEN RCVD TLK: CPT | Performed by: PAIN MEDICINE

## 2024-04-09 PROCEDURE — G8427 DOCREV CUR MEDS BY ELIG CLIN: HCPCS | Performed by: PAIN MEDICINE

## 2024-04-09 PROCEDURE — G8417 CALC BMI ABV UP PARAM F/U: HCPCS | Performed by: PAIN MEDICINE

## 2024-04-09 NOTE — PROGRESS NOTES
HPI:     Leg Pain   The incident occurred more than 1 week ago. The injury mechanism was a direct blow. The pain is present in the left knee, left leg, left foot and left toes. The quality of the pain is described as aching, burning, cramping, shooting and stabbing. The pain is moderate. The pain has been Constant since onset. Associated symptoms include an inability to bear weight. Possible foreign bodies include metal. The symptoms are aggravated by movement, palpation and weight bearing. He has tried non-weight bearing, rest, elevation and acetaminophen for the symptoms.     EMG with peroneal nerve injury.  Has seen surgeon.  Plans on going ahead with peripheral nerve decompression.  Does have dropfoot.  Walks with a cane.  Gabapentin with benefit.  Flexeril prn with some benefit as well.  Does use compounding cream.    Patient denies any new neurological symptoms. Nobowel or bladder incontinence, no weakness, and no falling.    Review of OARRS does not show any aberrant prescription behavior. Medication is helping the patient stay active. Patient denies any side effects and reports adequate analgesia. No sign of misuse/abuse.    Past Medical History:   Diagnosis Date    Scalp hematoma 7/29/2020    Stomach ache     \"most of the time\"       Past Surgical History:   Procedure Laterality Date    HERNIA REPAIR  12/3/2003    abdominal    INGUINAL HERNIA REPAIR Right 10/15/2015       Allergies   Allergen Reactions    Aspirin      Nose bleeds, thins blood         Current Outpatient Medications:     ibuprofen (ADVIL;MOTRIN) 800 MG tablet, Take 1 tablet by mouth every 6 hours as needed for Pain, Disp: , Rfl:     meloxicam (MOBIC) 15 MG tablet, TAKE 1 TABLET BY MOUTH DAILY, Disp: 30 tablet, Rfl: 3    cyclobenzaprine (FLEXERIL) 10 MG tablet, Take 1 tablet by mouth 2 times daily as needed for Muscle spasms, Disp: 60 tablet, Rfl: 2    gabapentin (NEURONTIN) 600 MG tablet, Take 1 tablet by mouth 3 times daily for 90 days.,

## 2024-04-17 RX ORDER — SODIUM CHLORIDE, SODIUM LACTATE, POTASSIUM CHLORIDE, CALCIUM CHLORIDE 600; 310; 30; 20 MG/100ML; MG/100ML; MG/100ML; MG/100ML
INJECTION, SOLUTION INTRAVENOUS CONTINUOUS
OUTPATIENT
Start: 2024-04-17

## 2024-04-17 NOTE — DISCHARGE INSTRUCTIONS
You may shave your face or neck.    ·Please wear loose, comfortable clothing.  If you are potentially going to have a cast or brace bring clothing that will fit over them.      ·Bring a list of all medications you take, along with the dose of the medications and how often you take it.  If more convenient bring the pharmacy bottles in a zip lock bag.    ·Brush your teeth but do not swallow water.    ·Bring your eyeglasses and case with you.  No contacts are to be worn the day of surgery.  You also may bring your hearing aids.    ·Do not bring any valuables, such as jewelry, cash or credit cards.  If you are staying overnight with us, please bring a SMALL bag of personal items.  We cannot accommodate large items, like suitcases.    ·If your child is having surgery please make arrangements for any other children to be cared for at home on the day of surgery.  Other children are not permitted in recovery room and we want you to be able to spend time with the patient.  If other arrangements are not available then we suggest that you have a second adult to stay in the waiting room.    ·If you are having any type of anesthesia you are to have nothing to eat or drink after midnight the night before your surgery.  This includes gum, mints, water or smoking or chewing tobacco.  The only exception to this is a small sip of water to take with any morning dose of heart, blood pressure, or seizure medications.    ·Bring your inhaler if you are currently using one.    ·Bring your blood band if one has been given to you.  Please do not close the clasp.    ·If you are on C-PAP or Bi-PAP at home and plan on staying in the hospital overnight for your surgery please bring the machine with you.    ·Do not wear any jewelry or body piercings day of surgery.  Also, NO lotion, perfume or deodorant to be used the day of surgery.      If you have any other questions regarding your procedure/surgery please call  your surgeon's office.     If

## 2024-04-18 ENCOUNTER — OFFICE VISIT (OUTPATIENT)
Dept: GASTROENTEROLOGY | Age: 44
End: 2024-04-18
Payer: MEDICAID

## 2024-04-18 VITALS
HEIGHT: 71 IN | SYSTOLIC BLOOD PRESSURE: 122 MMHG | BODY MASS INDEX: 35.7 KG/M2 | DIASTOLIC BLOOD PRESSURE: 69 MMHG | HEART RATE: 83 BPM | WEIGHT: 255 LBS

## 2024-04-18 DIAGNOSIS — R10.13 EPIGASTRIC PAIN: Primary | ICD-10-CM

## 2024-04-18 PROCEDURE — G8417 CALC BMI ABV UP PARAM F/U: HCPCS | Performed by: INTERNAL MEDICINE

## 2024-04-18 PROCEDURE — 4004F PT TOBACCO SCREEN RCVD TLK: CPT | Performed by: INTERNAL MEDICINE

## 2024-04-18 PROCEDURE — G8428 CUR MEDS NOT DOCUMENT: HCPCS | Performed by: INTERNAL MEDICINE

## 2024-04-18 PROCEDURE — 99204 OFFICE O/P NEW MOD 45 MIN: CPT | Performed by: INTERNAL MEDICINE

## 2024-04-18 NOTE — PROGRESS NOTES
Needs: Not on file   Physical Activity: Unknown (1/18/2024)    Exercise Vital Sign     Days of Exercise per Week: 1 day     Minutes of Exercise per Session: Not on file   Stress: Not on file   Social Connections: Not on file   Intimate Partner Violence: Not on file   Housing Stability: Not on file       REVIEW OF SYSTEMS: A 12-point review of systemswas obtained and pertinent positives and negatives were enumerated above in the history of present illness. All other reviewed systems / symptoms were negative.  Constitutional: No fever, no chills, no lethargy, no weakness.  HEENT:  No headache, otalgia, itchy eyes, nasal discharge or sore throat.  Cardiac:  No chest pain, dyspnea, orthopnea or PND.  Chest:   No cough, phlegm or wheezing.  Abdomen:       See above  Neuro:   No focal weakness, abnormal movements or seizure like activity.  Skin:   No rashes, no itching.  Extremities:  No swelling or joint pains.  ROS was otherwise negative    PHYSICAL EXAMINATION: Vital signs reviewed per the nursing documentation.     There were no vitals taken for this visit.  There is no height or weight on file to calculate BMI.     Constitutional: Patient is oriented to person, place, and time. Patient appears well-developed and well-nourished.   Head: Normocephalic and atraumatic.   Eyes: Pupils are equal, round, and reactive to light. EOM are normal.   Neck: Normal range of motion. Neck supple. No JVD present. No tracheal deviation present. No thyromegaly present.   Cardiovascular: Normal rate, regular rhythm, normal heart sounds and intact distal pulses.   Pulmonary/Chest: Effort normal and breath sounds normal. No stridor. No respiratory distress. No wheezes. No rales. No tenderness.   Abdominal: Soft. Bowel sounds are normal. No distension and no mass. There is no tenderness. There is no rebound and no guarding. No hernia.       LABORATORY DATA: Reviewed  Lab Results   Component Value Date    WBC 12.9 (H) 01/11/2024    HGB 15.6

## 2024-04-22 ENCOUNTER — TELEPHONE (OUTPATIENT)
Dept: GASTROENTEROLOGY | Age: 44
End: 2024-04-22

## 2024-04-23 ENCOUNTER — HOSPITAL ENCOUNTER (OUTPATIENT)
Dept: PREADMISSION TESTING | Age: 44
Discharge: HOME | End: 2024-04-27
Payer: MEDICAID

## 2024-04-23 VITALS
HEART RATE: 96 BPM | DIASTOLIC BLOOD PRESSURE: 86 MMHG | SYSTOLIC BLOOD PRESSURE: 127 MMHG | RESPIRATION RATE: 20 BRPM | OXYGEN SATURATION: 96 % | WEIGHT: 258 LBS | HEIGHT: 71 IN | BODY MASS INDEX: 36.12 KG/M2 | TEMPERATURE: 97.7 F

## 2024-04-23 LAB
ABO + RH BLD: NORMAL
ANION GAP SERPL CALCULATED.3IONS-SCNC: 11 MMOL/L (ref 9–16)
ARM BAND NUMBER: NORMAL
BILIRUB UR QL STRIP: NEGATIVE
BLOOD BANK SAMPLE EXPIRATION: NORMAL
BLOOD GROUP ANTIBODIES SERPL: NEGATIVE
BUN SERPL-MCNC: 16 MG/DL (ref 6–20)
CHLORIDE SERPL-SCNC: 103 MMOL/L (ref 98–107)
CLARITY UR: CLEAR
CO2 SERPL-SCNC: 23 MMOL/L (ref 20–31)
COLOR UR: YELLOW
COMMENT: NORMAL
CREAT SERPL-MCNC: 0.8 MG/DL (ref 0.7–1.2)
EKG ATRIAL RATE: 82 BPM
EKG P AXIS: 77 DEGREES
EKG P-R INTERVAL: 164 MS
EKG Q-T INTERVAL: 350 MS
EKG QRS DURATION: 92 MS
EKG QTC CALCULATION (BAZETT): 408 MS
EKG R AXIS: 85 DEGREES
EKG T AXIS: 60 DEGREES
EKG VENTRICULAR RATE: 82 BPM
ERYTHROCYTE [DISTWIDTH] IN BLOOD BY AUTOMATED COUNT: 14 % (ref 11.8–14.4)
GFR SERPL CREATININE-BSD FRML MDRD: >90 ML/MIN/1.73M2
GLUCOSE SERPL-MCNC: 98 MG/DL (ref 74–99)
GLUCOSE UR STRIP-MCNC: NEGATIVE MG/DL
HCT VFR BLD AUTO: 40.9 % (ref 40.7–50.3)
HGB BLD-MCNC: 13.7 G/DL (ref 13–17)
HGB UR QL STRIP.AUTO: NEGATIVE
KETONES UR STRIP-MCNC: NEGATIVE MG/DL
LEUKOCYTE ESTERASE UR QL STRIP: NEGATIVE
MCH RBC QN AUTO: 31.9 PG (ref 25.2–33.5)
MCHC RBC AUTO-ENTMCNC: 33.5 G/DL (ref 28.4–34.8)
MCV RBC AUTO: 95.3 FL (ref 82.6–102.9)
NITRITE UR QL STRIP: NEGATIVE
NRBC BLD-RTO: 0 PER 100 WBC
PH UR STRIP: 6.5 [PH] (ref 5–8)
PLATELET # BLD AUTO: 209 K/UL (ref 138–453)
PMV BLD AUTO: 10.5 FL (ref 8.1–13.5)
POTASSIUM SERPL-SCNC: 4.2 MMOL/L (ref 3.7–5.3)
PROT UR STRIP-MCNC: NEGATIVE MG/DL
RBC # BLD AUTO: 4.29 M/UL (ref 4.21–5.77)
SODIUM SERPL-SCNC: 137 MMOL/L (ref 136–145)
SP GR UR STRIP: 1.01 (ref 1–1.03)
UROBILINOGEN UR STRIP-ACNC: NORMAL EU/DL (ref 0–1)
WBC OTHER # BLD: 6.1 K/UL (ref 3.5–11.3)

## 2024-04-23 PROCEDURE — 36415 COLL VENOUS BLD VENIPUNCTURE: CPT

## 2024-04-23 PROCEDURE — 86901 BLOOD TYPING SEROLOGIC RH(D): CPT

## 2024-04-23 PROCEDURE — 86900 BLOOD TYPING SEROLOGIC ABO: CPT

## 2024-04-23 PROCEDURE — 82947 ASSAY GLUCOSE BLOOD QUANT: CPT

## 2024-04-23 PROCEDURE — 81003 URINALYSIS AUTO W/O SCOPE: CPT

## 2024-04-23 PROCEDURE — 86850 RBC ANTIBODY SCREEN: CPT

## 2024-04-23 PROCEDURE — 80051 ELECTROLYTE PANEL: CPT

## 2024-04-23 PROCEDURE — 93005 ELECTROCARDIOGRAM TRACING: CPT | Performed by: NEUROLOGICAL SURGERY

## 2024-04-23 PROCEDURE — 84520 ASSAY OF UREA NITROGEN: CPT

## 2024-04-23 PROCEDURE — 82565 ASSAY OF CREATININE: CPT

## 2024-04-23 PROCEDURE — 85027 COMPLETE CBC AUTOMATED: CPT

## 2024-04-23 ASSESSMENT — PAIN DESCRIPTION - PAIN TYPE: TYPE: CHRONIC PAIN

## 2024-04-23 ASSESSMENT — PAIN SCALES - GENERAL: PAINLEVEL_OUTOF10: 7

## 2024-04-23 ASSESSMENT — PAIN DESCRIPTION - ORIENTATION: ORIENTATION: LEFT

## 2024-04-23 ASSESSMENT — PAIN DESCRIPTION - LOCATION: LOCATION: FOOT;KNEE

## 2024-04-23 ASSESSMENT — PAIN DESCRIPTION - FREQUENCY: FREQUENCY: CONTINUOUS

## 2024-04-23 ASSESSMENT — PAIN - FUNCTIONAL ASSESSMENT: PAIN_FUNCTIONAL_ASSESSMENT: PREVENTS OR INTERFERES WITH MANY ACTIVE NOT PASSIVE ACTIVITIES

## 2024-04-23 NOTE — PROGRESS NOTES
Anesthesia Focused Assessment    Hx of anesthesia complications:  no  Family hx of anesthesia complications:  no      Tested positive for Covid-19 in last 8 weeks: no  Upper respiratory infection within the last 4 weeks: no      STOP-BANG Sleep Apnea Questionnaire    SNORE loudly (heard through closed doors)?   Yes  TIRED, fatigued, sleepy during daytime?    No  OBSERVED stopping breathing during sleep?   No  High blood PRESSURE or being treated?    Yes    BMI over 35?        Yes  AGE over 50?        No  NECK circumference over 16\"?     No  GENDER (male)?       Yes             Total 4  High risk 5-8  Intermediate risk 3-4  Low risk 0-2    ----------------------------------------------------------------------------------------------------------------------  MAURILIO                              No  If yes, machine?          DM1                                            No  DM2                   No    Coronary Artery Disease      No  HTN         Yes, on meds  Defib/AICD/Pacemaker               No             Renal Failure                   No  If yes, on dialysis           Active smoker?       Yes, 1 ppd  Drinks alcohol?       Yes, occasional  Illicit drugs?        Yes, THC daily  Dentition?        benign      Past Medical History:   Diagnosis Date    Anxiety and depression     COVID-19 vaccine series not administered     Hypertension     Scalp hematoma 07/29/2020    Stomach ache     \"most of the time\"    Under care of service provider     GI Dr Aleman / last visit 4/18/24    Under care of service provider     Pain Management Dr. Katz / last visit 4/9/24    Under care of service provider     Neurosurgery Dr. Marino / last visit 4/4/24    Under care of service provider     Ortho Dr. Abelino Pacheco / last visit Jan 2024    Wellness examination     PCP SIRI Trevino / last visit Feb 2024         Patient was evaluated in PAT & anesthesia guidelines were applied.   NPO guidelines, medication instructions and

## 2024-04-23 NOTE — H&P
total daily dose to 90mg    ProviderAyala MD   valACYclovir (VALTREX) 500 MG tablet Take 1 tablet by mouth daily as needed (onset of lesions) 8/29/23 8/28/24  Reba Hidalgo APRN - CNP   DULoxetine (CYMBALTA) 60 MG extended release capsule Take 1 capsule by mouth daily 8/29/23 8/28/24  Reba Hidalgo APRN - CNP   Gabapentin POWD  11/7/22   Ayala Boucher MD   lidocaine (LIDODERM) 5 % 5 patches 2/16/22   Sander, MD Ayala     Past Medical History    has a past medical history of Anxiety and depression, COVID-19 vaccine series not administered, Hypertension, Scalp hematoma, Stomach ache, Under care of service provider, Under care of service provider, Under care of service provider, Under care of service provider, and Wellness examination.  Past Surgical History   has a past surgical history that includes hernia repair (12/03/2003) and Inguinal hernia repair (Right, 10/15/2015).  Social History   reports that he has been smoking cigarettes. He has a 25.0 pack-year smoking history. He uses smokeless tobacco.    reports current alcohol use.    reports current drug use. Drug: Marijuana (Weed).   Marital Status   Occupation none  Family History  Family Status   Relation Name Status    Mother  Alive    Father  Alive    PGM  (Not Specified)    PGF  (Not Specified)     family history includes Diabetes in his father and paternal grandfather; High Blood Pressure in his father and paternal grandmother; No Known Problems in his mother.    Review of Systems:  CONSTITUTIONAL:   negative for fevers, chills, fatigue and malaise    EYES:   negative for double vision, blurred vision and photophobia    HEENT:   negative for tinnitus, epistaxis and sore throat     RESPIRATORY:   negative for cough, shortness of breath, wheezing     CARDIOVASCULAR:   negative for chest pain, palpitations, syncope, edema     GASTROINTESTINAL:   negative for nausea, vomiting     GENITOURINARY:   negative for

## 2024-04-24 LAB
EKG ATRIAL RATE: 82 BPM
EKG P AXIS: 77 DEGREES
EKG P-R INTERVAL: 164 MS
EKG Q-T INTERVAL: 350 MS
EKG QRS DURATION: 92 MS
EKG QTC CALCULATION (BAZETT): 408 MS
EKG R AXIS: 85 DEGREES
EKG T AXIS: 60 DEGREES
EKG VENTRICULAR RATE: 82 BPM

## 2024-04-24 PROCEDURE — 93010 ELECTROCARDIOGRAM REPORT: CPT | Performed by: INTERNAL MEDICINE

## 2024-05-09 ENCOUNTER — ANESTHESIA EVENT (OUTPATIENT)
Dept: OPERATING ROOM | Age: 44
End: 2024-05-09
Payer: MEDICAID

## 2024-05-09 ASSESSMENT — LIFESTYLE VARIABLES: SMOKING_STATUS: 1

## 2024-05-10 ENCOUNTER — ANESTHESIA (OUTPATIENT)
Dept: OPERATING ROOM | Age: 44
End: 2024-05-10
Payer: MEDICAID

## 2024-05-10 ENCOUNTER — HOSPITAL ENCOUNTER (OUTPATIENT)
Age: 44
Setting detail: OUTPATIENT SURGERY
Discharge: HOME | End: 2024-05-10
Attending: NEUROLOGICAL SURGERY | Admitting: NEUROLOGICAL SURGERY
Payer: MEDICAID

## 2024-05-10 VITALS
HEART RATE: 108 BPM | HEIGHT: 71 IN | RESPIRATION RATE: 18 BRPM | DIASTOLIC BLOOD PRESSURE: 82 MMHG | WEIGHT: 259 LBS | TEMPERATURE: 97 F | OXYGEN SATURATION: 99 % | BODY MASS INDEX: 36.26 KG/M2 | SYSTOLIC BLOOD PRESSURE: 134 MMHG

## 2024-05-10 DIAGNOSIS — G57.32 NEUROPATHY OF PERONEAL NERVE AT LEFT KNEE: Primary | ICD-10-CM

## 2024-05-10 PROCEDURE — 2500000003 HC RX 250 WO HCPCS: Performed by: NURSE ANESTHETIST, CERTIFIED REGISTERED

## 2024-05-10 PROCEDURE — 2580000003 HC RX 258: Performed by: NEUROLOGICAL SURGERY

## 2024-05-10 PROCEDURE — 3600000004 HC SURGERY LEVEL 4 BASE: Performed by: NEUROLOGICAL SURGERY

## 2024-05-10 PROCEDURE — 3700000001 HC ADD 15 MINUTES (ANESTHESIA): Performed by: NEUROLOGICAL SURGERY

## 2024-05-10 PROCEDURE — 3600000014 HC SURGERY LEVEL 4 ADDTL 15MIN: Performed by: NEUROLOGICAL SURGERY

## 2024-05-10 PROCEDURE — 7100000010 HC PHASE II RECOVERY - FIRST 15 MIN: Performed by: NEUROLOGICAL SURGERY

## 2024-05-10 PROCEDURE — 7100000001 HC PACU RECOVERY - ADDTL 15 MIN: Performed by: NEUROLOGICAL SURGERY

## 2024-05-10 PROCEDURE — 3700000000 HC ANESTHESIA ATTENDED CARE: Performed by: NEUROLOGICAL SURGERY

## 2024-05-10 PROCEDURE — 6360000002 HC RX W HCPCS: Performed by: NURSE ANESTHETIST, CERTIFIED REGISTERED

## 2024-05-10 PROCEDURE — 2709999900 HC NON-CHARGEABLE SUPPLY: Performed by: NEUROLOGICAL SURGERY

## 2024-05-10 PROCEDURE — 7100000000 HC PACU RECOVERY - FIRST 15 MIN: Performed by: NEUROLOGICAL SURGERY

## 2024-05-10 PROCEDURE — 7100000011 HC PHASE II RECOVERY - ADDTL 15 MIN: Performed by: NEUROLOGICAL SURGERY

## 2024-05-10 PROCEDURE — 2580000003 HC RX 258: Performed by: NURSE ANESTHETIST, CERTIFIED REGISTERED

## 2024-05-10 PROCEDURE — 2500000003 HC RX 250 WO HCPCS: Performed by: NEUROLOGICAL SURGERY

## 2024-05-10 PROCEDURE — 2720000010 HC SURG SUPPLY STERILE: Performed by: NEUROLOGICAL SURGERY

## 2024-05-10 DEVICE — NEUROSHIELD® IS A CHITOSAN POLYSACCHARIDE FILM INDICATED FOR THE REPAIR OF PERIPHERAL NERVE INJURIES BY PROVIDING A PROTECTIVE BARRIERDURING TISSUE HEALING.
Type: IMPLANTABLE DEVICE | Status: FUNCTIONAL
Brand: NEUROSHIELD

## 2024-05-10 RX ORDER — ONDANSETRON 2 MG/ML
4 INJECTION INTRAMUSCULAR; INTRAVENOUS
Status: DISCONTINUED | OUTPATIENT
Start: 2024-05-10 | End: 2024-05-10 | Stop reason: HOSPADM

## 2024-05-10 RX ORDER — LABETALOL HYDROCHLORIDE 5 MG/ML
10 INJECTION, SOLUTION INTRAVENOUS
Status: DISCONTINUED | OUTPATIENT
Start: 2024-05-10 | End: 2024-05-10 | Stop reason: HOSPADM

## 2024-05-10 RX ORDER — PROPOFOL 10 MG/ML
INJECTION, EMULSION INTRAVENOUS PRN
Status: DISCONTINUED | OUTPATIENT
Start: 2024-05-10 | End: 2024-05-10 | Stop reason: SDUPTHER

## 2024-05-10 RX ORDER — CEFAZOLIN SODIUM 1 G/3ML
INJECTION, POWDER, FOR SOLUTION INTRAMUSCULAR; INTRAVENOUS PRN
Status: DISCONTINUED | OUTPATIENT
Start: 2024-05-10 | End: 2024-05-10 | Stop reason: SDUPTHER

## 2024-05-10 RX ORDER — MAGNESIUM HYDROXIDE 1200 MG/15ML
LIQUID ORAL CONTINUOUS PRN
Status: DISCONTINUED | OUTPATIENT
Start: 2024-05-10 | End: 2024-05-10 | Stop reason: HOSPADM

## 2024-05-10 RX ORDER — KETAMINE HCL IN NACL, ISO-OSM 100MG/10ML
SYRINGE (ML) INJECTION PRN
Status: DISCONTINUED | OUTPATIENT
Start: 2024-05-10 | End: 2024-05-10 | Stop reason: SDUPTHER

## 2024-05-10 RX ORDER — DEXAMETHASONE SODIUM PHOSPHATE 10 MG/ML
INJECTION INTRAMUSCULAR; INTRAVENOUS PRN
Status: DISCONTINUED | OUTPATIENT
Start: 2024-05-10 | End: 2024-05-10 | Stop reason: SDUPTHER

## 2024-05-10 RX ORDER — MIDAZOLAM HYDROCHLORIDE 1 MG/ML
INJECTION INTRAMUSCULAR; INTRAVENOUS PRN
Status: DISCONTINUED | OUTPATIENT
Start: 2024-05-10 | End: 2024-05-10 | Stop reason: SDUPTHER

## 2024-05-10 RX ORDER — NALOXONE HYDROCHLORIDE 0.4 MG/ML
INJECTION, SOLUTION INTRAMUSCULAR; INTRAVENOUS; SUBCUTANEOUS PRN
Status: DISCONTINUED | OUTPATIENT
Start: 2024-05-10 | End: 2024-05-10 | Stop reason: HOSPADM

## 2024-05-10 RX ORDER — HYDRALAZINE HYDROCHLORIDE 20 MG/ML
10 INJECTION INTRAMUSCULAR; INTRAVENOUS
Status: DISCONTINUED | OUTPATIENT
Start: 2024-05-10 | End: 2024-05-10 | Stop reason: HOSPADM

## 2024-05-10 RX ORDER — FENTANYL CITRATE 50 UG/ML
INJECTION, SOLUTION INTRAMUSCULAR; INTRAVENOUS PRN
Status: DISCONTINUED | OUTPATIENT
Start: 2024-05-10 | End: 2024-05-10 | Stop reason: SDUPTHER

## 2024-05-10 RX ORDER — ROCURONIUM BROMIDE 10 MG/ML
INJECTION, SOLUTION INTRAVENOUS PRN
Status: DISCONTINUED | OUTPATIENT
Start: 2024-05-10 | End: 2024-05-10 | Stop reason: SDUPTHER

## 2024-05-10 RX ORDER — LIDOCAINE HYDROCHLORIDE 10 MG/ML
INJECTION, SOLUTION EPIDURAL; INFILTRATION; INTRACAUDAL; PERINEURAL PRN
Status: DISCONTINUED | OUTPATIENT
Start: 2024-05-10 | End: 2024-05-10 | Stop reason: SDUPTHER

## 2024-05-10 RX ORDER — ONDANSETRON 2 MG/ML
INJECTION INTRAMUSCULAR; INTRAVENOUS PRN
Status: DISCONTINUED | OUTPATIENT
Start: 2024-05-10 | End: 2024-05-10 | Stop reason: SDUPTHER

## 2024-05-10 RX ORDER — SODIUM CHLORIDE, SODIUM LACTATE, POTASSIUM CHLORIDE, CALCIUM CHLORIDE 600; 310; 30; 20 MG/100ML; MG/100ML; MG/100ML; MG/100ML
INJECTION, SOLUTION INTRAVENOUS CONTINUOUS PRN
Status: DISCONTINUED | OUTPATIENT
Start: 2024-05-10 | End: 2024-05-10 | Stop reason: SDUPTHER

## 2024-05-10 RX ORDER — SODIUM CHLORIDE 9 MG/ML
INJECTION, SOLUTION INTRAVENOUS PRN
Status: DISCONTINUED | OUTPATIENT
Start: 2024-05-10 | End: 2024-05-10 | Stop reason: HOSPADM

## 2024-05-10 RX ORDER — CEPHALEXIN 500 MG/1
500 CAPSULE ORAL 3 TIMES DAILY
Qty: 3 CAPSULE | Refills: 0 | Status: SHIPPED | OUTPATIENT
Start: 2024-05-10 | End: 2024-05-11

## 2024-05-10 RX ORDER — SODIUM CHLORIDE, SODIUM LACTATE, POTASSIUM CHLORIDE, CALCIUM CHLORIDE 600; 310; 30; 20 MG/100ML; MG/100ML; MG/100ML; MG/100ML
INJECTION, SOLUTION INTRAVENOUS CONTINUOUS
Status: DISCONTINUED | OUTPATIENT
Start: 2024-05-10 | End: 2024-05-10 | Stop reason: HOSPADM

## 2024-05-10 RX ORDER — LIDOCAINE HYDROCHLORIDE AND EPINEPHRINE 10; 10 MG/ML; UG/ML
INJECTION, SOLUTION INFILTRATION; PERINEURAL PRN
Status: DISCONTINUED | OUTPATIENT
Start: 2024-05-10 | End: 2024-05-10 | Stop reason: HOSPADM

## 2024-05-10 RX ORDER — SODIUM CHLORIDE 0.9 % (FLUSH) 0.9 %
5-40 SYRINGE (ML) INJECTION EVERY 12 HOURS SCHEDULED
Status: DISCONTINUED | OUTPATIENT
Start: 2024-05-10 | End: 2024-05-10 | Stop reason: HOSPADM

## 2024-05-10 RX ORDER — SODIUM CHLORIDE 0.9 % (FLUSH) 0.9 %
5-40 SYRINGE (ML) INJECTION PRN
Status: DISCONTINUED | OUTPATIENT
Start: 2024-05-10 | End: 2024-05-10 | Stop reason: HOSPADM

## 2024-05-10 RX ORDER — PHENYLEPHRINE HCL IN 0.9% NACL 1 MG/10 ML
SYRINGE (ML) INTRAVENOUS PRN
Status: DISCONTINUED | OUTPATIENT
Start: 2024-05-10 | End: 2024-05-10 | Stop reason: SDUPTHER

## 2024-05-10 RX ORDER — HYDROCODONE BITARTRATE AND ACETAMINOPHEN 5; 325 MG/1; MG/1
1 TABLET ORAL EVERY 6 HOURS PRN
Qty: 28 TABLET | Refills: 0 | Status: SHIPPED | OUTPATIENT
Start: 2024-05-10 | End: 2024-05-17

## 2024-05-10 RX ORDER — BUPIVACAINE HYDROCHLORIDE AND EPINEPHRINE 5; 5 MG/ML; UG/ML
INJECTION, SOLUTION PERINEURAL PRN
Status: DISCONTINUED | OUTPATIENT
Start: 2024-05-10 | End: 2024-05-10 | Stop reason: HOSPADM

## 2024-05-10 RX ADMIN — Medication 30 MG: at 13:00

## 2024-05-10 RX ADMIN — Medication 100 MCG: at 10:02

## 2024-05-10 RX ADMIN — FENTANYL CITRATE 50 MCG: 50 INJECTION, SOLUTION INTRAMUSCULAR; INTRAVENOUS at 09:25

## 2024-05-10 RX ADMIN — LIDOCAINE HYDROCHLORIDE 5 ML: 10 INJECTION, SOLUTION EPIDURAL; INFILTRATION; INTRACAUDAL; PERINEURAL at 09:25

## 2024-05-10 RX ADMIN — FENTANYL CITRATE 25 MCG: 50 INJECTION, SOLUTION INTRAMUSCULAR; INTRAVENOUS at 11:03

## 2024-05-10 RX ADMIN — SUGAMMADEX 300 MG: 100 INJECTION, SOLUTION INTRAVENOUS at 13:11

## 2024-05-10 RX ADMIN — CEFAZOLIN 2 G: 1 INJECTION, POWDER, FOR SOLUTION INTRAMUSCULAR; INTRAVENOUS at 10:01

## 2024-05-10 RX ADMIN — ROCURONIUM BROMIDE 20 MG: 10 INJECTION, SOLUTION INTRAVENOUS at 10:15

## 2024-05-10 RX ADMIN — ONDANSETRON 4 MG: 2 INJECTION INTRAMUSCULAR; INTRAVENOUS at 12:56

## 2024-05-10 RX ADMIN — FENTANYL CITRATE 25 MCG: 50 INJECTION, SOLUTION INTRAMUSCULAR; INTRAVENOUS at 11:09

## 2024-05-10 RX ADMIN — FENTANYL CITRATE 25 MCG: 50 INJECTION, SOLUTION INTRAMUSCULAR; INTRAVENOUS at 11:16

## 2024-05-10 RX ADMIN — FENTANYL CITRATE 25 MCG: 50 INJECTION, SOLUTION INTRAMUSCULAR; INTRAVENOUS at 11:52

## 2024-05-10 RX ADMIN — PROPOFOL 200 MG: 10 INJECTION, EMULSION INTRAVENOUS at 09:26

## 2024-05-10 RX ADMIN — ROCURONIUM BROMIDE 50 MG: 10 INJECTION, SOLUTION INTRAVENOUS at 09:27

## 2024-05-10 RX ADMIN — SODIUM CHLORIDE, POTASSIUM CHLORIDE, SODIUM LACTATE AND CALCIUM CHLORIDE: 600; 310; 30; 20 INJECTION, SOLUTION INTRAVENOUS at 09:18

## 2024-05-10 RX ADMIN — MIDAZOLAM 2 MG: 1 INJECTION INTRAMUSCULAR; INTRAVENOUS at 09:21

## 2024-05-10 RX ADMIN — DEXAMETHASONE SODIUM PHOSPHATE 10 MG: 10 INJECTION INTRAMUSCULAR; INTRAVENOUS at 10:05

## 2024-05-10 RX ADMIN — FENTANYL CITRATE 50 MCG: 50 INJECTION, SOLUTION INTRAMUSCULAR; INTRAVENOUS at 12:01

## 2024-05-10 ASSESSMENT — PAIN - FUNCTIONAL ASSESSMENT: PAIN_FUNCTIONAL_ASSESSMENT: NONE - DENIES PAIN

## 2024-05-10 NOTE — ANESTHESIA PRE PROCEDURE
to auscultation  (+)           current smoker                           Cardiovascular:  Exercise tolerance: Walks with cane  (+) hypertension:        Rhythm: regular  Rate: normal                    Neuro/Psych:   (+) neuromuscular disease:, psychiatric history:depression/anxiety              ROS comment: CPRS; numbness and tingling of all 5 toes on left LE GI/Hepatic/Renal:   (+) GERD:          Endo/Other: Negative Endo/Other ROS                    Abdominal:             Vascular: negative vascular ROS.         Other Findings:         Anesthesia Plan      general     ASA 2       Induction: intravenous.    MIPS: Postoperative opioids intended and Prophylactic antiemetics administered.  Anesthetic plan and risks discussed with patient.    Use of blood products discussed with patient whom consented to blood products.    Plan discussed with CRNA.                  Rodney Dalton MD   5/9/2024

## 2024-05-10 NOTE — BRIEF OP NOTE
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 Item Serial No.  Lot No. LRB No. Used Action   NeuroShield     [id number] Left 1 Implanted         Drains: * No LDAs found *    Findings:  Infection Present At Time Of Surgery (PATOS) (choose all levels that have infection present):  No infection present  Other Findings: severe fibrosis    Electronically signed by Jamila Marino DO on 5/10/2024 at 7:10 PM

## 2024-05-10 NOTE — DISCHARGE INSTRUCTIONS
Peroneal Nerve Decompression Surgery Discharge Instructions     Thank you for choosing [facility] [place] and [facility] for your surgical needs. The following instructions will help to ensure your comfort and that you are well prepared after your surgery.     Post-Operative Visit:   The office is located at:    [facility]    [facility] [address], [address], [place] [address]    [phone]     Please also call your primary care physician to schedule an appointment for further evaluation and care.     Diet:   You may resume your regular diet.   Be sure to eat a well-balanced diet. Protein promotes wound healing.   Pain medication and decreased activity can cause constipation. Drink 8-10 glasses of water a day, eat fresh fruits and vegetables, and add prunes, raisins and bran cereals to your diet if you do become constipated. A stool softener taken 1-2 times a day is helpful. Dulcolax suppositories or Fleets enemas are also available without a prescription. Call our office if the problem continues.     Activity and Exercise:   No driving until you are seen in the office.   Avoid riding in a car for the first two weeks until you come to the office for your scheduled follow-up.   Start taking short, frequent walks in the beginning. [place], more frequent walks throughout the day are more beneficial than one long walk each day. You may gradually increase the distance; as tolerated.    If your pain increases, you may be walking too much or too far. Try backing off for a day or two and then resume slowly.   No lifting greater than 5 lbs.  No baths, swimming or hot tub until you discuss this with your doctor.  Patient refrain from utilizing his ankle-foot orthosis until he is evaluated in the office.  Patient can be weightbearing as tolerated.  If necessary, patient can utilize crutches and partial weightbearing to the left lower

## 2024-05-10 NOTE — H&P
and malaise    EYES:   negative for double vision, blurred vision and photophobia    HEENT:   negative for tinnitus, epistaxis and sore throat     RESPIRATORY:   negative for cough, shortness of breath, wheezing     CARDIOVASCULAR:   negative for chest pain, palpitations, syncope, edema     GASTROINTESTINAL:   negative for nausea, vomiting     GENITOURINARY:   negative for incontinence     MUSCULOSKELETAL:   negative for neck pain see HPI    NEUROLOGICAL:   Negative for weakness and tingling  negative for headaches and dizziness     PSYCHIATRIC:   negative for anxiety        OBJECTIVE:   VITALS:  height is 1.803 m (5' 11\") and weight is 117 kg (258 lb). His temporal temperature is 97.7 °F (36.5 °C). His blood pressure is 127/86 and his pulse is 96. His respiration is 20 and oxygen saturation is 96%.   CONSTITUTIONAL:alert & oriented x 3, no acute distress. Calm and pleasant.  SKIN:  Warm and dry, no rashes to exposed areas of skin.   HEAD:  Normocephalic, atraumatic.   EYES: PERRL.  EOMs intact.   EARS:  Intact and equal bilaterally. Hearing grossly WNL.    NOSE:  Nares patent.  No rhinorrhea   MOUTH/THROAT:  Mucous membranes pink and moist, teeth appear to be intact.   NECK:supple, good ROM.  LUNGS: Respirations even and non-labored. Clear to auscultation bilaterally, no wheezes, rales, or rhonchi.    CARDIOVASCULAR: Regular rate and rhythm, no murmurs.   ABDOMEN: soft, non-tender, non-distended, bowel sounds active x 4   EXTREMITIES: 1+ edema to bilateral lower extremities. No varicosities to bilateral lower extremities.   NEUROLOGIC: CN II-XII are grossly intact.  Gait is antalgic, using cane.   Testing:   [id number]  Labs pending: drawn 2024   IMPRESSIONS:   Peroneal neuropathy at left knee; complex regional pain syndrome.   PLANS:        LEG PERONEAL NERVE DECOMPRESSION  (SUGITA TABLE, SUPINE, LEFT SHOULDER BUMP, GUARDIAN NERVE STIMULATOR) - Left         KYA Daily - CNP  Electronically signed  4/23/2024 at 10:39 AM

## 2024-05-10 NOTE — PROGRESS NOTES
Neurosurgery Post op Progress Note      SUBJECTIVE: Status post: 1.  Left peroneal nerve decompression.  Patient seen while in recovery.  Patient is in good spirits.  Patient is having minimal complaints of pain to the surgical site.  He still continues to have left foot drop.  He does have sensation intact to his lower extremity.      OBJECTIVE      Physical exam   VITALS:    Vitals:    05/10/24 1330   BP: 128/86   Pulse: (!) 135   Resp: 14   Temp:    SpO2: 99%     INTAKE:    Intake/Output Summary (Last 24 hours) at 5/10/2024 1341  Last data filed at 5/10/2024 1319  Gross per 24 hour   Intake 1500 ml   Output 5 ml   Net 1495 ml     URINARY CATHETER OUTPUT (Read): No Read catheter.    DRAIN/TUBE OUTPUT: No drains to the surgical site.    No evidence of DVT seen on physical exam.    Neurological exam reveals Awake and alert, Responds to voice, and Responds to tactile stimuli  alert, oriented x3, affect appropriate, no involuntary movements, and reflexes at knee and ankle intact  cranial nerves II through XII intact.   left leg: poor foot dorsiflexion and EHL function.  Some inversion and eversion at the foot.  Sensation intact.  no sensory deficits noted and normal light touch sensation      Wound   Post op wound: Below the left lateral knee.      well approximated incisionclean, dry, and no drainage Closed w/ continuous Monocryl suture and Dermabond.  No dressing over incision site.    Data      LABS:   Lab Results   Component Value Date    WBC 6.1 04/23/2024    HGB 13.7 04/23/2024    HCT 40.9 04/23/2024    MCV 95.3 04/23/2024     04/23/2024      Lab Results   Component Value Date     04/23/2024    K 4.2 04/23/2024     04/23/2024    CO2 23 04/23/2024     Lab Results   Component Value Date    BUN 16 04/23/2024      Lab Results   Component Value Date    CREATININE 0.8 04/23/2024        ASSESSMENT AND PLAN  1.  Okay to discharge patient to home from recovery if stable.  2.  Electronic prescriptions  sent to the patient's pharmacy for antibiotics and narcotic analgesics for pain.  3.  Postop instructions given to patient with regards to diet, activity and exercise, incision care and hygiene, and pain management.  4.  Patient can be weightbearing as tolerated.  If necessary he can utilize crutches and be partial weightbearing to the left lower extremity.  5.  Patient is to refrain from wearing his foot drop brace/AFO until he is seen in the office.  6.  Patient is to elevate his left leg when at rest on 1-2 pillows to help reduce pain and swelling.  He is to utilize ice to the area of the incision site for a period of time of 20 minutes on 1 hour off as needed for pain control.  This should be done at least 4 times a day.  7.  Patient should follow-up with Dr. Marino in his private office in approximately 2 weeks time or sooner should complications arise.

## 2024-05-10 NOTE — OP NOTE
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 Item Serial No.  Lot No. LRB No. Used Action   NeuroShield     [id number] Left 1 Implanted         Drains: * No LDAs found *    Findings:  Infection Present At Time Of Surgery (PATOS) (choose all levels that have infection present):  No infection present  Other Findings: severe fibrosis    Detailed Description of Procedure:   This is a ***-year-old *** who has suffered from **** of progressive neuropathic pain, numbness, and dorsiflexion weakness of the foot corresponds to a peroneal nerve neuropathy, confirmed by EMG.  The Patient  had MRI of the lumbar spine as well as at the knee that was negative for significant radiculopathy or mass lesion around her peroneal nerve.  The patient has failed conservative management including physical therapy and pain medicines and I recommended surgical decompression of the right peroneal nerve.  Risk of surgery include pain, infection, wound problems, failure to improve, nerve damage, need for more surgery, medical/anesthesia risks.  Alternatives no surgery.  All questions answered I was asked to proceed with surgery     Detailed Description of Procedure:   Patient was brought in the OR by anesthesiology staff.  Large-bore IV secured.  Patient was started on MAC anesthesia.  Patient's right leg was bent at the knee and slightly turned to the left.  The  circumferentially decompressed without any tension in both extension and flexion of the knee.  I did not get much muscle twitch with a 20 mA stimulation at the distal nerve.  More proximally the nerve was tightly bound by a thick rind of scar tissue.  The fibrosis was dissected with combination of sharp and blunt technique until the nerve was freed the mobile.  There was a layer of thickened epineurogram which was also opened until the nerve disappeared approximately into the thigh and was felt to be under no tension.  The stimulation of the nerve at this point allowed for some contraction of the proximal anterior lateral muscles.  There was minimal bleeding.  hemostasis was obtained with bipolar at low setting.  The wound was thoroughly irrigated.  Marcaine was infiltrated in subcutaneous tissue.  The wound was then closed in interrupted reverse sutures in standard fashion.  A running 3-0 Monocryl was then used to close the skin.  And the skin was dressed with Dermabond followed by loose gauze.  All counts were correct at end the procedure.  Patient tolerated procedure well.         Electronically signed by Jamila Marino DO on 5/10/2024 at 7:11 PM

## 2024-05-11 NOTE — ANESTHESIA POSTPROCEDURE EVALUATION
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    Conner Phase I: Conner Score: 10    Conner Phase II: Conner Score: 10    Anesthesia Post Evaluation    Patient location during evaluation: PACU  Patient participation: complete - patient participated  Level of consciousness: awake and alert  Pain score: 3  Airway patency: patent  Nausea & Vomiting: no nausea and no vomiting  Cardiovascular status: hemodynamically stable  Respiratory status: acceptable  Hydration status: euvolemic  Pain management: adequate    No notable events documented.

## 2024-05-17 ENCOUNTER — HOSPITAL ENCOUNTER (INPATIENT)
Age: 44
LOS: 2 days | Discharge: HOME | DRG: 197 | End: 2024-05-19
Attending: EMERGENCY MEDICINE | Admitting: INTERNAL MEDICINE
Payer: MEDICAID

## 2024-05-17 ENCOUNTER — APPOINTMENT (OUTPATIENT)
Dept: VASCULAR LAB | Age: 44
DRG: 197 | End: 2024-05-17
Payer: MEDICAID

## 2024-05-17 DIAGNOSIS — G89.18 PAIN AT SURGICAL SITE: Primary | ICD-10-CM

## 2024-05-17 DIAGNOSIS — I82.4Z2 ACUTE DEEP VEIN THROMBOSIS (DVT) OF DISTAL VEIN OF LEFT LOWER EXTREMITY (HCC): ICD-10-CM

## 2024-05-17 DIAGNOSIS — G57.32 NEUROPATHY OF PERONEAL NERVE AT LEFT KNEE: ICD-10-CM

## 2024-05-17 PROBLEM — L03.116 CELLULITIS OF LEFT LOWER EXTREMITY: Status: ACTIVE | Noted: 2024-05-17

## 2024-05-17 PROBLEM — S80.12XA TRAUMATIC ECCHYMOSIS OF LEFT LOWER LEG: Status: ACTIVE | Noted: 2024-05-17

## 2024-05-17 PROBLEM — I82.412 ACUTE DEEP VEIN THROMBOSIS (DVT) OF FEMORAL VEIN OF LEFT LOWER EXTREMITY (HCC): Status: ACTIVE | Noted: 2024-05-17

## 2024-05-17 LAB
ANION GAP SERPL CALCULATED.3IONS-SCNC: 12 MMOL/L (ref 9–16)
ANTI-XA UNFRAC HEPARIN: 1.22 IU/L
ANTI-XA UNFRAC HEPARIN: 1.39 IU/L
BASOPHILS # BLD: 0.05 K/UL (ref 0–0.2)
BASOPHILS NFR BLD: 1 % (ref 0–2)
BUN SERPL-MCNC: 15 MG/DL (ref 6–20)
CALCIUM SERPL-MCNC: 8.9 MG/DL (ref 8.6–10.4)
CHLORIDE SERPL-SCNC: 102 MMOL/L (ref 98–107)
CO2 SERPL-SCNC: 24 MMOL/L (ref 20–31)
CREAT SERPL-MCNC: 1.1 MG/DL (ref 0.7–1.2)
CRP SERPL HS-MCNC: 16.4 MG/L (ref 0–5)
EOSINOPHIL # BLD: 0.1 K/UL (ref 0–0.44)
EOSINOPHILS RELATIVE PERCENT: 1 % (ref 1–4)
ERYTHROCYTE [DISTWIDTH] IN BLOOD BY AUTOMATED COUNT: 13.7 % (ref 11.8–14.4)
ERYTHROCYTE [SEDIMENTATION RATE] IN BLOOD BY PHOTOMETRIC METHOD: 14 MM/HR (ref 0–15)
GFR, ESTIMATED: 84 ML/MIN/1.73M2
GLUCOSE SERPL-MCNC: 85 MG/DL (ref 74–99)
HCT VFR BLD AUTO: 39.3 % (ref 40.7–50.3)
HGB BLD-MCNC: 13.2 G/DL (ref 13–17)
IMM GRANULOCYTES # BLD AUTO: 0.06 K/UL (ref 0–0.3)
IMM GRANULOCYTES NFR BLD: 1 %
INR PPP: 1.1
LACTIC ACID, SEPSIS WHOLE BLOOD: 0.7 MMOL/L (ref 0.5–1.9)
LACTIC ACID, SEPSIS WHOLE BLOOD: 2.7 MMOL/L (ref 0.5–1.9)
LYMPHOCYTES NFR BLD: 2.31 K/UL (ref 1.1–3.7)
LYMPHOCYTES RELATIVE PERCENT: 21 % (ref 24–43)
MCH RBC QN AUTO: 32.7 PG (ref 25.2–33.5)
MCHC RBC AUTO-ENTMCNC: 33.6 G/DL (ref 28.4–34.8)
MCV RBC AUTO: 97.3 FL (ref 82.6–102.9)
MONOCYTES NFR BLD: 0.91 K/UL (ref 0.1–1.2)
MONOCYTES NFR BLD: 8 % (ref 3–12)
NEUTROPHILS NFR BLD: 68 % (ref 36–65)
NEUTS SEG NFR BLD: 7.66 K/UL (ref 1.5–8.1)
NRBC BLD-RTO: 0 PER 100 WBC
PARTIAL THROMBOPLASTIN TIME: 28.6 SEC (ref 23–36.5)
PLATELET # BLD AUTO: 254 K/UL (ref 138–453)
PMV BLD AUTO: 10.6 FL (ref 8.1–13.5)
POTASSIUM SERPL-SCNC: 3.9 MMOL/L (ref 3.7–5.3)
PROTHROMBIN TIME: 14 SEC (ref 11.7–14.9)
RBC # BLD AUTO: 4.04 M/UL (ref 4.21–5.77)
SODIUM SERPL-SCNC: 138 MMOL/L (ref 136–145)
WBC OTHER # BLD: 11.1 K/UL (ref 3.5–11.3)

## 2024-05-17 PROCEDURE — 85610 PROTHROMBIN TIME: CPT

## 2024-05-17 PROCEDURE — 85520 HEPARIN ASSAY: CPT

## 2024-05-17 PROCEDURE — APPSS30 APP SPLIT SHARED TIME 16-30 MINUTES: Performed by: NURSE PRACTITIONER

## 2024-05-17 PROCEDURE — 2580000003 HC RX 258: Performed by: STUDENT IN AN ORGANIZED HEALTH CARE EDUCATION/TRAINING PROGRAM

## 2024-05-17 PROCEDURE — 85652 RBC SED RATE AUTOMATED: CPT

## 2024-05-17 PROCEDURE — 85730 THROMBOPLASTIN TIME PARTIAL: CPT

## 2024-05-17 PROCEDURE — 96374 THER/PROPH/DIAG INJ IV PUSH: CPT

## 2024-05-17 PROCEDURE — 6370000000 HC RX 637 (ALT 250 FOR IP)

## 2024-05-17 PROCEDURE — 93971 EXTREMITY STUDY: CPT

## 2024-05-17 PROCEDURE — 6370000000 HC RX 637 (ALT 250 FOR IP): Performed by: INTERNAL MEDICINE

## 2024-05-17 PROCEDURE — 99223 1ST HOSP IP/OBS HIGH 75: CPT | Performed by: INTERNAL MEDICINE

## 2024-05-17 PROCEDURE — 83605 ASSAY OF LACTIC ACID: CPT

## 2024-05-17 PROCEDURE — 93971 EXTREMITY STUDY: CPT | Performed by: SURGERY

## 2024-05-17 PROCEDURE — 86140 C-REACTIVE PROTEIN: CPT

## 2024-05-17 PROCEDURE — 1200000000 HC SEMI PRIVATE

## 2024-05-17 PROCEDURE — 80048 BASIC METABOLIC PNL TOTAL CA: CPT

## 2024-05-17 PROCEDURE — 6360000002 HC RX W HCPCS: Performed by: STUDENT IN AN ORGANIZED HEALTH CARE EDUCATION/TRAINING PROGRAM

## 2024-05-17 PROCEDURE — 99285 EMERGENCY DEPT VISIT HI MDM: CPT

## 2024-05-17 PROCEDURE — 36415 COLL VENOUS BLD VENIPUNCTURE: CPT

## 2024-05-17 PROCEDURE — 87040 BLOOD CULTURE FOR BACTERIA: CPT

## 2024-05-17 PROCEDURE — 85025 COMPLETE CBC W/AUTO DIFF WBC: CPT

## 2024-05-17 PROCEDURE — 99222 1ST HOSP IP/OBS MODERATE 55: CPT | Performed by: SURGERY

## 2024-05-17 RX ORDER — ACETAMINOPHEN 650 MG/1
650 SUPPOSITORY RECTAL EVERY 6 HOURS PRN
Status: DISCONTINUED | OUTPATIENT
Start: 2024-05-17 | End: 2024-05-19 | Stop reason: HOSPADM

## 2024-05-17 RX ORDER — HEPARIN SODIUM 10000 [USP'U]/100ML
5-30 INJECTION, SOLUTION INTRAVENOUS CONTINUOUS
Status: DISCONTINUED | OUTPATIENT
Start: 2024-05-17 | End: 2024-05-19

## 2024-05-17 RX ORDER — HEPARIN SODIUM 1000 [USP'U]/ML
80 INJECTION, SOLUTION INTRAVENOUS; SUBCUTANEOUS PRN
Status: DISCONTINUED | OUTPATIENT
Start: 2024-05-17 | End: 2024-05-19

## 2024-05-17 RX ORDER — ONDANSETRON 2 MG/ML
4 INJECTION INTRAMUSCULAR; INTRAVENOUS EVERY 6 HOURS PRN
Status: DISCONTINUED | OUTPATIENT
Start: 2024-05-17 | End: 2024-05-19 | Stop reason: HOSPADM

## 2024-05-17 RX ORDER — METOPROLOL SUCCINATE 25 MG/1
25 TABLET, EXTENDED RELEASE ORAL DAILY
Status: DISCONTINUED | OUTPATIENT
Start: 2024-05-18 | End: 2024-05-19 | Stop reason: HOSPADM

## 2024-05-17 RX ORDER — HEPARIN SODIUM 1000 [USP'U]/ML
80 INJECTION, SOLUTION INTRAVENOUS; SUBCUTANEOUS ONCE
Status: COMPLETED | OUTPATIENT
Start: 2024-05-17 | End: 2024-05-17

## 2024-05-17 RX ORDER — SODIUM CHLORIDE 0.9 % (FLUSH) 0.9 %
5-40 SYRINGE (ML) INJECTION EVERY 12 HOURS SCHEDULED
Status: DISCONTINUED | OUTPATIENT
Start: 2024-05-17 | End: 2024-05-19 | Stop reason: HOSPADM

## 2024-05-17 RX ORDER — POTASSIUM CHLORIDE 7.45 MG/ML
10 INJECTION INTRAVENOUS PRN
Status: DISCONTINUED | OUTPATIENT
Start: 2024-05-17 | End: 2024-05-19

## 2024-05-17 RX ORDER — CYCLOBENZAPRINE HCL 10 MG
10 TABLET ORAL 2 TIMES DAILY PRN
Status: DISCONTINUED | OUTPATIENT
Start: 2024-05-17 | End: 2024-05-19 | Stop reason: HOSPADM

## 2024-05-17 RX ORDER — SODIUM CHLORIDE 0.9 % (FLUSH) 0.9 %
5-40 SYRINGE (ML) INJECTION PRN
Status: DISCONTINUED | OUTPATIENT
Start: 2024-05-17 | End: 2024-05-19 | Stop reason: HOSPADM

## 2024-05-17 RX ORDER — ONDANSETRON 4 MG/1
4 TABLET, ORALLY DISINTEGRATING ORAL EVERY 8 HOURS PRN
Status: DISCONTINUED | OUTPATIENT
Start: 2024-05-17 | End: 2024-05-19 | Stop reason: HOSPADM

## 2024-05-17 RX ORDER — POLYETHYLENE GLYCOL 3350 17 G/17G
17 POWDER, FOR SOLUTION ORAL DAILY PRN
Status: DISCONTINUED | OUTPATIENT
Start: 2024-05-17 | End: 2024-05-19 | Stop reason: HOSPADM

## 2024-05-17 RX ORDER — DULOXETIN HYDROCHLORIDE 30 MG/1
30 CAPSULE, DELAYED RELEASE ORAL DAILY
Status: DISCONTINUED | OUTPATIENT
Start: 2024-05-18 | End: 2024-05-17

## 2024-05-17 RX ORDER — LINEZOLID 600 MG/1
600 TABLET, FILM COATED ORAL EVERY 12 HOURS SCHEDULED
Status: DISCONTINUED | OUTPATIENT
Start: 2024-05-17 | End: 2024-05-18

## 2024-05-17 RX ORDER — PANTOPRAZOLE SODIUM 40 MG/1
40 TABLET, DELAYED RELEASE ORAL
Status: DISCONTINUED | OUTPATIENT
Start: 2024-05-18 | End: 2024-05-19 | Stop reason: HOSPADM

## 2024-05-17 RX ORDER — OXYCODONE HYDROCHLORIDE 5 MG/1
5 TABLET ORAL EVERY 4 HOURS PRN
Status: DISCONTINUED | OUTPATIENT
Start: 2024-05-17 | End: 2024-05-19 | Stop reason: HOSPADM

## 2024-05-17 RX ORDER — POTASSIUM CHLORIDE 20 MEQ/1
40 TABLET, EXTENDED RELEASE ORAL PRN
Status: DISCONTINUED | OUTPATIENT
Start: 2024-05-17 | End: 2024-05-19

## 2024-05-17 RX ORDER — ACETAMINOPHEN 325 MG/1
650 TABLET ORAL EVERY 6 HOURS PRN
Status: DISCONTINUED | OUTPATIENT
Start: 2024-05-17 | End: 2024-05-19 | Stop reason: HOSPADM

## 2024-05-17 RX ORDER — GABAPENTIN 600 MG/1
600 TABLET ORAL 3 TIMES DAILY
Status: DISCONTINUED | OUTPATIENT
Start: 2024-05-17 | End: 2024-05-19 | Stop reason: HOSPADM

## 2024-05-17 RX ORDER — 0.9 % SODIUM CHLORIDE 0.9 %
1000 INTRAVENOUS SOLUTION INTRAVENOUS ONCE
Status: COMPLETED | OUTPATIENT
Start: 2024-05-17 | End: 2024-05-17

## 2024-05-17 RX ORDER — MAGNESIUM SULFATE IN WATER 40 MG/ML
2000 INJECTION, SOLUTION INTRAVENOUS PRN
Status: DISCONTINUED | OUTPATIENT
Start: 2024-05-17 | End: 2024-05-19

## 2024-05-17 RX ORDER — SODIUM CHLORIDE 9 MG/ML
INJECTION, SOLUTION INTRAVENOUS PRN
Status: DISCONTINUED | OUTPATIENT
Start: 2024-05-17 | End: 2024-05-19 | Stop reason: HOSPADM

## 2024-05-17 RX ORDER — HEPARIN SODIUM 1000 [USP'U]/ML
40 INJECTION, SOLUTION INTRAVENOUS; SUBCUTANEOUS PRN
Status: DISCONTINUED | OUTPATIENT
Start: 2024-05-17 | End: 2024-05-19

## 2024-05-17 RX ORDER — DULOXETIN HYDROCHLORIDE 30 MG/1
90 CAPSULE, DELAYED RELEASE ORAL DAILY
Status: DISCONTINUED | OUTPATIENT
Start: 2024-05-18 | End: 2024-05-19 | Stop reason: HOSPADM

## 2024-05-17 RX ADMIN — HEPARIN SODIUM 8900 UNITS: 1000 INJECTION INTRAVENOUS; SUBCUTANEOUS at 14:39

## 2024-05-17 RX ADMIN — HEPARIN SODIUM 18 UNITS/KG/HR: 10000 INJECTION, SOLUTION INTRAVENOUS at 14:40

## 2024-05-17 RX ADMIN — GABAPENTIN 600 MG: 600 TABLET, FILM COATED ORAL at 20:35

## 2024-05-17 RX ADMIN — LINEZOLID 600 MG: 600 TABLET, FILM COATED ORAL at 21:15

## 2024-05-17 RX ADMIN — CYCLOBENZAPRINE 10 MG: 10 TABLET, FILM COATED ORAL at 22:23

## 2024-05-17 RX ADMIN — CEFTRIAXONE SODIUM 1000 MG: 1 INJECTION, POWDER, FOR SOLUTION INTRAMUSCULAR; INTRAVENOUS at 14:39

## 2024-05-17 RX ADMIN — SODIUM CHLORIDE 1000 ML: 9 INJECTION, SOLUTION INTRAVENOUS at 12:53

## 2024-05-17 RX ADMIN — OXYCODONE 5 MG: 5 TABLET ORAL at 19:17

## 2024-05-17 ASSESSMENT — PAIN DESCRIPTION - DESCRIPTORS
DESCRIPTORS: CRAMPING;THROBBING
DESCRIPTORS: SPASM
DESCRIPTORS: DISCOMFORT
DESCRIPTORS: ACHING

## 2024-05-17 ASSESSMENT — PAIN DESCRIPTION - LOCATION
LOCATION: LEG

## 2024-05-17 ASSESSMENT — PAIN SCALES - GENERAL
PAINLEVEL_OUTOF10: 8
PAINLEVEL_OUTOF10: 0
PAINLEVEL_OUTOF10: 6
PAINLEVEL_OUTOF10: 10
PAINLEVEL_OUTOF10: 9
PAINLEVEL_OUTOF10: 7

## 2024-05-17 ASSESSMENT — PAIN - FUNCTIONAL ASSESSMENT
PAIN_FUNCTIONAL_ASSESSMENT: ACTIVITIES ARE NOT PREVENTED
PAIN_FUNCTIONAL_ASSESSMENT: 0-10
PAIN_FUNCTIONAL_ASSESSMENT: ACTIVITIES ARE NOT PREVENTED

## 2024-05-17 ASSESSMENT — PAIN DESCRIPTION - ORIENTATION
ORIENTATION: LEFT

## 2024-05-17 ASSESSMENT — PAIN DESCRIPTION - PAIN TYPE: TYPE: ACUTE PAIN

## 2024-05-17 ASSESSMENT — PAIN DESCRIPTION - FREQUENCY: FREQUENCY: CONTINUOUS
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
## 2024-05-17 NOTE — CONSULTS
Department of Neurosurgery                                            Nurse Practitioner Consult Note      Reason for Consult:  POD#7 s/p left peroneal nerve decompression, increased pain edema and warmth   Requesting Physician:    Neurosurgeon:   [] Dr. Estes  [x] Dr. Marino  [] Dr. Liu  [] Dr. Carter        History Obtained From:  patient    CHIEF COMPLAINT:         Chief Complaint   Patient presents with    Leg Swelling     Leg swelling/pain x a few days       HISTORY OF PRESENT ILLNESS:       The patient is a 43 y.o. male who presents with increased pain, edema and warmth to left leg. He had left peroneal nerve decompression with DR Marino on 5/10/2024 and was sent home same day on PO antibiotics for 3 doses (Keflex). He reports that 4 days post op he started to have increased pain edema and warmth. He does report that his preop nerve symptoms have improved.      He denies chest pain SOB nausea vomiting      Past Medical History:        Diagnosis Date    Anxiety and depression     COVID-19 vaccine series not administered     Hypertension     Scalp hematoma 07/29/2020    Stomach ache     \"most of the time\"    Under care of service provider     GI Dr Aleman / last visit 4/18/24    Under care of service provider     Pain Management Dr. Katz / last visit 4/9/24    Under care of service provider     Neurosurgery Dr. Marino / last visit 4/4/24    Under care of service provider     Ortho Dr. Abelino Pacheco / last visit Jan 2024    Wellness examination     PCP SIRI Trevino / last visit Feb 2024       Past Surgical History:        Procedure Laterality Date    HERNIA REPAIR  12/03/2003    umbilical    INGUINAL HERNIA REPAIR Right 10/15/2015    NERVE SURGERY Left 5/10/2024    LEFT PERONEAL NERVE DECOMPRESSION performed by Jamila [facility]ino DO at [facility] OR    PERONEAL NERVE DECOMPRESSION      LEFT PERONEAL NERVE DECOMPRESSION       Social History:   Social History     Socioeconomic History    Marital  and warmth to left leg s/p left peroneal nerve decompression on 5/10     Patient care will be discussed with attending, will reevaluated patient along with attending.       Obtain LLE dopplers to rule out clot  CBC, ESR and CRP  ID consult  If labs are unremarkable and if ID ok, we are ok with sending patient home with PO antibiotics  Additional recommendations may follow          Age indeterminate non-occlusive DVT in left middle femoral vein- started on heparin drip  IM and vascular consulted  Patient to be admitted       Please contact neurosurgery with any changes in patients neurologic status.     Thank you for your consult.       KYA Martinez - NP     [facility]   5/17/2024  12:57 PM
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
## 2024-05-17 NOTE — ED PROVIDER NOTES
[facility]     Emergency Department     Faculty Attestation    I performed a history and physical examination of the patient and discussed management with the resident. I reviewed the resident´s note and agree with the documented findings and plan of care. Any areas of disagreement are noted on the chart. I was personally present for the key portions of any procedures. I have documented in the chart those procedures where I was not present during the key portions. I have reviewed the emergency nurses triage note. I agree with the chief complaint, past medical history, past surgical history, allergies, medications, social and family history as documented unless otherwise noted below. For Physician Assistant/ Nurse Practitioner cases/documentation I have personally evaluated this patient and have completed at least one if not all key elements of the E/M (history, physical exam, and MDM). Additional findings are as noted.    Cellulitis left leg, no subcutaneous air palpated.     Horace Montoya MD  05/17/24 1092

## 2024-05-17 NOTE — H&P
Attending Supervising Physician’s Attestation Statement  I have seen and examined Xander Bell and the key elements of all parts of the encounter have been performed by me.  I agree with the assessment, plan and orders as documented. I discussed the findings and plans with the resident physician and agree as documented in their note .    In addition to the pertinent positives and negatives as stated within HPI and the review of systems as documented in the notes, all other systems were reviewed when able to and are reported negative.    Additional Comments:   43 M presented with LLE swelling  Underwent recent surgery last week - peroneal never decompression  Noted increased leg swelling last few days  Found to have DVT in ER  Vascular consulted  Also concern for possible wound infection - ID/NS consulted  Continue antibiotics per ID  Follow up surgery recommendations     Electronically signed by Carloz Diallo MD on 5/17/2024 at 5:00 PM

## 2024-05-17 NOTE — ED PROVIDER NOTES
[facility] ED  Emergency Department Encounter  Emergency Medicine Resident     Pt Name:Xander Bell  MRN: [id number]  Birthdate 1980  Date of evaluation: 5/17/24  PCP:  Reba Hidalgo APRN - CNP  Note Started: 12:22 PM EDT    CHIEF COMPLAINT       Chief Complaint   Patient presents with    Leg Swelling     Leg swelling/pain x a few days       HISTORY OF PRESENT ILLNESS  (Location/Symptom, Timing/Onset, Context/Setting, Quality, Duration, Modifying Factors, Severity.)      Xander Bell is a 43 y.o. male who presents with left lower extremity pain and cramping as well as worsening erythema for the past few days. He has a history of left peroneal nerve decompression on 5/10 with Dr Marino. He states he has had difficulty bearing weight on the left lower extremity due to significant cramping. He denies fevers, chills, drainage from site, numbness, tingling, weakness, trauma or injury, history of DVT or PE.    PAST MEDICAL / SURGICAL / SOCIAL / FAMILY HISTORY      has a past medical history of Anxiety and depression, COVID-19 vaccine series not administered, Hypertension, Scalp hematoma, Stomach ache, Under care of service provider, Under care of service provider, Under care of service provider, Under care of service provider, and Wellness examination.       has a past surgical history that includes hernia repair (12/03/2003); Inguinal hernia repair (Right, 10/15/2015); Peroneal nerve decompression; and Nerve Surgery (Left, 5/10/2024).      Social History     Socioeconomic History    Marital status: Single     Spouse name: Not on file    Number of children: Not on file    Years of education: Not on file    Highest education level: Not on file   Occupational History    Occupation: Construction, brick   Tobacco Use    Smoking status: Every Day     Current packs/day: 1.00     Average packs/day: 1 pack/day for 25.0 years (25.0 ttl pk-yrs)     Types: Cigarettes    Smokeless tobacco: Current

## 2024-05-17 NOTE — PROGRESS NOTES
[facility]    Admission Medication Reconciliation       The patient's list of current home medications has been reviewed.     Source(s) of information: Patient Interview, Patient had current home medication bottles with them, and Dispense report.    Based on information provided by the above source(s), I have updated the patient's home med list as described below.     Please review the ACTION REQUESTED section of this note below for any discrepancies on current hospital orders.      I changed or updated the following medications on the patient's home medication list:  Removed Gabapentin POWD     Added No medications added     Adjusted         No medications adjusted    Other Notes Patient reports they take Gabapentin 600mg tablet at 7a, 3p, 11p daily   Patient takes 90mg of duloxetine daily (60mg+30mg)           Please feel free to call me with any questions about this encounter.     Thank you.  Edwardo Dominguez, PharmD, [facility]  Inpatient Clinical Pharmacist  [phone]      Electronically signed by TIANNA RODRIGUEZ on 5/17/2024 at 3:46 PM

## 2024-05-17 NOTE — ED NOTES
Pt to ED with c/o LLE swelling, redness, warmth, and pain. Pt states he had nerve surgery a week ago on LLE stating increased symptoms since. Notable swelling, bruising, warmth to LLE.   
PERONEAL NERVE DECOMPRESSION performed by Jamila Marino DO at [facility] OR    PERONEAL NERVE DECOMPRESSION      LEFT PERONEAL NERVE DECOMPRESSION       PAST MEDICAL HISTORY       Past Medical History:   Diagnosis Date    Anxiety and depression     COVID-19 vaccine series not administered     Hypertension     Scalp hematoma 07/29/2020    Stomach ache     \"most of the time\"    Under care of service provider     GI Dr Aleman / last visit 4/18/24    Under care of service provider     Pain Management Dr. Katz / last visit 4/9/24    Under care of service provider     Neurosurgery Dr. Marino / last visit 4/4/24    Under care of service provider     Ortho Dr. Abelino Pacheco / last visit Jan 2024    Wellness examination     PCP SIRI Trevino / last visit Feb 2024       Labs:  Labs Reviewed   CBC WITH AUTO DIFFERENTIAL - Abnormal; Notable for the following components:       Result Value    RBC 4.04 (*)     Hematocrit 39.3 (*)     Neutrophils % 68 (*)     Lymphocytes % 21 (*)     Immature Granulocytes % 1 (*)     All other components within normal limits   LACTATE, SEPSIS - Abnormal; Notable for the following components:    Lactic Acid, Sepsis, Whole Blood 2.7 (*)     All other components within normal limits   C-REACTIVE PROTEIN - Abnormal; Notable for the following components:    CRP 16.4 (*)     All other components within normal limits   CULTURE, BLOOD 1   CULTURE, BLOOD 1   CULTURE, URINE   INFECTIOUS DISEASE INTERVENTION   BASIC METABOLIC PANEL   SEDIMENTATION RATE   PROTIME-INR   APTT   URINALYSIS WITH MICROSCOPIC   LACTATE, SEPSIS   ANTI-XA, UNFRACTIONATED HEPARIN   ANTI-XA, UNFRACTIONATED HEPARIN       Electronically signed by Leny Ventura RN on 5/17/2024 at 3:30 PM

## 2024-05-17 NOTE — PROGRESS NOTES
[facility]    Admission Medication Reconciliation       The patient's list of current home medications has been reviewed.     Source(s) of information: Patient Interview, Patient had their current home medication bottles with them, and Dispense report.    Based on information provided by the above source(s), I have updated the patient's home med list as described below.     Please review the ACTION REQUESTED section of this note below for any discrepancies on current hospital orders.      I changed or updated the following medications on the patient's home medication list:  Removed Gabapentin POWD     Added No medications added     Adjusted         No medications adjusted    Other Notes Patient reports they take their Gabapentin 600mg tablet at 7a, 3p, 11p daily   Patient takes 90mg of duloxetine daily (60mg+30mg)           Please feel free to call me with any questions about this encounter.     Thank you.  Edwardo Dominguez, PharmD, [facility]  Inpatient Clinical Pharmacist  [phone]      Electronically signed by TIANNA RODRIGUEZ on 5/17/2024 at 3:46 PM

## 2024-05-18 PROBLEM — I87.2 VENOUS STASIS DERMATITIS OF LEFT LOWER EXTREMITY: Status: ACTIVE | Noted: 2024-05-18

## 2024-05-18 PROBLEM — K21.9 GERD (GASTROESOPHAGEAL REFLUX DISEASE): Status: ACTIVE | Noted: 2024-05-18

## 2024-05-18 PROBLEM — Z86.79 HISTORY OF SINUS TACHYCARDIA: Status: ACTIVE | Noted: 2024-05-18

## 2024-05-18 LAB
ANION GAP SERPL CALCULATED.3IONS-SCNC: 8 MMOL/L (ref 9–16)
ANTI-XA UNFRAC HEPARIN: 0.42 IU/L
ANTI-XA UNFRAC HEPARIN: 0.53 IU/L
ANTI-XA UNFRAC HEPARIN: 0.58 IU/L
ANTI-XA UNFRAC HEPARIN: 0.82 IU/L
BACTERIA URNS QL MICRO: NORMAL
BASOPHILS # BLD: 0.07 K/UL (ref 0–0.2)
BASOPHILS NFR BLD: 1 % (ref 0–2)
BILIRUB UR QL STRIP: NEGATIVE
BUN SERPL-MCNC: 14 MG/DL (ref 6–20)
CALCIUM SERPL-MCNC: 8.1 MG/DL (ref 8.6–10.4)
CASTS #/AREA URNS LPF: NORMAL /LPF (ref 0–8)
CHLORIDE SERPL-SCNC: 106 MMOL/L (ref 98–107)
CLARITY UR: CLEAR
CO2 SERPL-SCNC: 24 MMOL/L (ref 20–31)
COLOR UR: YELLOW
CREAT SERPL-MCNC: 0.8 MG/DL (ref 0.7–1.2)
CRP SERPL HS-MCNC: 21.6 MG/L (ref 0–5)
EOSINOPHIL # BLD: 0.34 K/UL (ref 0–0.44)
EOSINOPHILS RELATIVE PERCENT: 3 % (ref 1–4)
EPI CELLS #/AREA URNS HPF: NORMAL /HPF (ref 0–5)
ERYTHROCYTE [DISTWIDTH] IN BLOOD BY AUTOMATED COUNT: 14.1 % (ref 11.8–14.4)
GFR, ESTIMATED: >90 ML/MIN/1.73M2
GLUCOSE SERPL-MCNC: 92 MG/DL (ref 74–99)
GLUCOSE UR STRIP-MCNC: NEGATIVE MG/DL
HCT VFR BLD AUTO: 34.6 % (ref 40.7–50.3)
HGB BLD-MCNC: 11.1 G/DL (ref 13–17)
HGB UR QL STRIP.AUTO: NEGATIVE
IMM GRANULOCYTES # BLD AUTO: 0.06 K/UL (ref 0–0.3)
IMM GRANULOCYTES NFR BLD: 1 %
KETONES UR STRIP-MCNC: NEGATIVE MG/DL
LEUKOCYTE ESTERASE UR QL STRIP: NEGATIVE
LYMPHOCYTES NFR BLD: 4.07 K/UL (ref 1.1–3.7)
LYMPHOCYTES RELATIVE PERCENT: 40 % (ref 24–43)
MCH RBC QN AUTO: 32.2 PG (ref 25.2–33.5)
MCHC RBC AUTO-ENTMCNC: 32.1 G/DL (ref 28.4–34.8)
MCV RBC AUTO: 100.3 FL (ref 82.6–102.9)
MONOCYTES NFR BLD: 1.02 K/UL (ref 0.1–1.2)
MONOCYTES NFR BLD: 10 % (ref 3–12)
NEUTROPHILS NFR BLD: 46 % (ref 36–65)
NEUTS SEG NFR BLD: 4.69 K/UL (ref 1.5–8.1)
NITRITE UR QL STRIP: NEGATIVE
NRBC BLD-RTO: 0 PER 100 WBC
PH UR STRIP: 6.5 [PH] (ref 5–8)
PLATELET # BLD AUTO: 211 K/UL (ref 138–453)
PMV BLD AUTO: 10.5 FL (ref 8.1–13.5)
POTASSIUM SERPL-SCNC: 3.9 MMOL/L (ref 3.7–5.3)
PROT UR STRIP-MCNC: NEGATIVE MG/DL
RBC # BLD AUTO: 3.45 M/UL (ref 4.21–5.77)
RBC #/AREA URNS HPF: NORMAL /HPF (ref 0–4)
SODIUM SERPL-SCNC: 138 MMOL/L (ref 136–145)
SP GR UR STRIP: 1.02 (ref 1–1.03)
UROBILINOGEN UR STRIP-ACNC: NORMAL EU/DL (ref 0–1)
WBC #/AREA URNS HPF: NORMAL /HPF (ref 0–5)
WBC OTHER # BLD: 10.3 K/UL (ref 3.5–11.3)

## 2024-05-18 PROCEDURE — 81001 URINALYSIS AUTO W/SCOPE: CPT

## 2024-05-18 PROCEDURE — 36415 COLL VENOUS BLD VENIPUNCTURE: CPT

## 2024-05-18 PROCEDURE — 6360000002 HC RX W HCPCS: Performed by: STUDENT IN AN ORGANIZED HEALTH CARE EDUCATION/TRAINING PROGRAM

## 2024-05-18 PROCEDURE — 80048 BASIC METABOLIC PNL TOTAL CA: CPT

## 2024-05-18 PROCEDURE — 99232 SBSQ HOSP IP/OBS MODERATE 35: CPT | Performed by: INTERNAL MEDICINE

## 2024-05-18 PROCEDURE — 6370000000 HC RX 637 (ALT 250 FOR IP)

## 2024-05-18 PROCEDURE — 85025 COMPLETE CBC W/AUTO DIFF WBC: CPT

## 2024-05-18 PROCEDURE — 86140 C-REACTIVE PROTEIN: CPT

## 2024-05-18 PROCEDURE — 85520 HEPARIN ASSAY: CPT

## 2024-05-18 PROCEDURE — 1200000000 HC SEMI PRIVATE

## 2024-05-18 PROCEDURE — 87086 URINE CULTURE/COLONY COUNT: CPT

## 2024-05-18 PROCEDURE — 6370000000 HC RX 637 (ALT 250 FOR IP): Performed by: STUDENT IN AN ORGANIZED HEALTH CARE EDUCATION/TRAINING PROGRAM

## 2024-05-18 PROCEDURE — 6370000000 HC RX 637 (ALT 250 FOR IP): Performed by: INTERNAL MEDICINE

## 2024-05-18 RX ORDER — NICOTINE 21 MG/24HR
1 PATCH, TRANSDERMAL 24 HOURS TRANSDERMAL DAILY
Status: DISCONTINUED | OUTPATIENT
Start: 2024-05-18 | End: 2024-05-19 | Stop reason: HOSPADM

## 2024-05-18 RX ADMIN — LINEZOLID 600 MG: 600 TABLET, FILM COATED ORAL at 08:58

## 2024-05-18 RX ADMIN — CYCLOBENZAPRINE 10 MG: 10 TABLET, FILM COATED ORAL at 08:59

## 2024-05-18 RX ADMIN — OXYCODONE 5 MG: 5 TABLET ORAL at 03:54

## 2024-05-18 RX ADMIN — GABAPENTIN 600 MG: 600 TABLET, FILM COATED ORAL at 20:58

## 2024-05-18 RX ADMIN — OXYCODONE 5 MG: 5 TABLET ORAL at 17:35

## 2024-05-18 RX ADMIN — GABAPENTIN 600 MG: 600 TABLET, FILM COATED ORAL at 13:13

## 2024-05-18 RX ADMIN — PANTOPRAZOLE SODIUM 40 MG: 40 TABLET, DELAYED RELEASE ORAL at 06:06

## 2024-05-18 RX ADMIN — GABAPENTIN 600 MG: 600 TABLET, FILM COATED ORAL at 08:59

## 2024-05-18 RX ADMIN — OXYCODONE 5 MG: 5 TABLET ORAL at 08:58

## 2024-05-18 RX ADMIN — METOPROLOL SUCCINATE 25 MG: 25 TABLET, EXTENDED RELEASE ORAL at 08:59

## 2024-05-18 RX ADMIN — OXYCODONE 5 MG: 5 TABLET ORAL at 13:13

## 2024-05-18 RX ADMIN — HEPARIN SODIUM 13 UNITS/KG/HR: 10000 INJECTION, SOLUTION INTRAVENOUS at 06:06

## 2024-05-18 RX ADMIN — CYCLOBENZAPRINE 10 MG: 10 TABLET, FILM COATED ORAL at 17:35

## 2024-05-18 ASSESSMENT — PAIN DESCRIPTION - ORIENTATION
ORIENTATION: LEFT

## 2024-05-18 ASSESSMENT — PAIN SCALES - GENERAL
PAINLEVEL_OUTOF10: 8
PAINLEVEL_OUTOF10: 2
PAINLEVEL_OUTOF10: 8
PAINLEVEL_OUTOF10: 0
PAINLEVEL_OUTOF10: 0
PAINLEVEL_OUTOF10: 2
PAINLEVEL_OUTOF10: 1
PAINLEVEL_OUTOF10: 8
PAINLEVEL_OUTOF10: 8

## 2024-05-18 ASSESSMENT — PAIN DESCRIPTION - LOCATION
LOCATION: LEG

## 2024-05-18 ASSESSMENT — PAIN - FUNCTIONAL ASSESSMENT
PAIN_FUNCTIONAL_ASSESSMENT: ACTIVITIES ARE NOT PREVENTED

## 2024-05-18 ASSESSMENT — PAIN SCALES - WONG BAKER
WONGBAKER_NUMERICALRESPONSE: HURTS A LITTLE BIT
WONGBAKER_NUMERICALRESPONSE: NO HURT
WONGBAKER_NUMERICALRESPONSE: HURTS A LITTLE BIT
WONGBAKER_NUMERICALRESPONSE: NO HURT

## 2024-05-18 ASSESSMENT — PAIN DESCRIPTION - DESCRIPTORS
DESCRIPTORS: ACHING;BURNING
DESCRIPTORS: ACHING
DESCRIPTORS: ACHING;CRAMPING
DESCRIPTORS: DISCOMFORT;TIGHTNESS
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
## 2024-05-18 NOTE — CARE COORDINATION
Case Management Assessment  Initial Evaluation    Date/Time of Evaluation: 5/18/2024 10:19 AM  Assessment Completed by: RIGOBERTO CHILDERS RN    If patient is discharged prior to next notation, then this note serves as note for discharge by case management.    Patient Name: Xander Bell                   YOB: 1980  Diagnosis: DVT, lower extremity, distal, acute, left (HCC) [I82.4Z2]  Pain at surgical site [G89.18]  Acute deep vein thrombosis (DVT) of distal vein of left lower extremity (HCC) [I82.4Z2]                   Date / Time: 5/17/2024 12:20 PM    Patient Admission Status: Inpatient   Readmission Risk (Low < 19, Mod (19-27), High > 27): Readmission Risk Score: 8.1    Current PCP: Reba Hidalgo APRN - CNP  PCP verified by CM? Yes    Chart Reviewed: Yes      History Provided by: Patient  Patient Orientation: Alert and Oriented    Patient Cognition: Alert    Hospitalization in the last 30 days (Readmission):  No    If yes, Readmission Assessment in CM Navigator will be completed.    Advance Directives:      Code Status: Full Code   Patient's Primary Decision Maker is:        Discharge Planning:    Patient lives with: Parent Type of Home: House  Primary Care Giver: Self  Patient Support Systems include: Parent   Current Financial resources:    Current community resources:    Current services prior to admission: Durable Medical Equipment            Current DME: Cane, Shower Chair            Type of Home Care services:  None    ADLS  Prior functional level: Independent in ADLs/IADLs  Current functional level: Independent in ADLs/IADLs    PT AM-PAC:   /24  OT AM-PAC:   /24    Family can provide assistance at DC: Yes  Would you like Case Management to discuss the discharge plan with any other family members/significant others, and if so, who? No  Plans to Return to Present Housing: Yes  Other Identified Issues/Barriers to RETURNING to current housing: none  Potential Assistance needed at
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
## 2024-05-19 VITALS
HEART RATE: 86 BPM | RESPIRATION RATE: 16 BRPM | SYSTOLIC BLOOD PRESSURE: 133 MMHG | TEMPERATURE: 98.1 F | OXYGEN SATURATION: 98 % | WEIGHT: 244.71 LBS | BODY MASS INDEX: 34.13 KG/M2 | DIASTOLIC BLOOD PRESSURE: 82 MMHG

## 2024-05-19 LAB
ANION GAP SERPL CALCULATED.3IONS-SCNC: 9 MMOL/L (ref 9–16)
ANTI-XA UNFRAC HEPARIN: 0.3 IU/L
BASOPHILS # BLD: 0.05 K/UL (ref 0–0.2)
BASOPHILS NFR BLD: 1 % (ref 0–2)
BUN SERPL-MCNC: 14 MG/DL (ref 6–20)
CALCIUM SERPL-MCNC: 8.8 MG/DL (ref 8.6–10.4)
CHLORIDE SERPL-SCNC: 104 MMOL/L (ref 98–107)
CO2 SERPL-SCNC: 27 MMOL/L (ref 20–31)
CREAT SERPL-MCNC: 0.9 MG/DL (ref 0.7–1.2)
EOSINOPHIL # BLD: 0.23 K/UL (ref 0–0.44)
EOSINOPHILS RELATIVE PERCENT: 2 % (ref 1–4)
ERYTHROCYTE [DISTWIDTH] IN BLOOD BY AUTOMATED COUNT: 13.9 % (ref 11.8–14.4)
GFR, ESTIMATED: >90 ML/MIN/1.73M2
GLUCOSE SERPL-MCNC: 94 MG/DL (ref 74–99)
HCT VFR BLD AUTO: 35.9 % (ref 40.7–50.3)
HGB BLD-MCNC: 11.8 G/DL (ref 13–17)
IMM GRANULOCYTES # BLD AUTO: 0.04 K/UL (ref 0–0.3)
IMM GRANULOCYTES NFR BLD: 0 %
LYMPHOCYTES NFR BLD: 3.47 K/UL (ref 1.1–3.7)
LYMPHOCYTES RELATIVE PERCENT: 36 % (ref 24–43)
MCH RBC QN AUTO: 32.5 PG (ref 25.2–33.5)
MCHC RBC AUTO-ENTMCNC: 32.9 G/DL (ref 28.4–34.8)
MCV RBC AUTO: 98.9 FL (ref 82.6–102.9)
MICROORGANISM SPEC CULT: NO GROWTH
MONOCYTES NFR BLD: 0.9 K/UL (ref 0.1–1.2)
MONOCYTES NFR BLD: 9 % (ref 3–12)
NEUTROPHILS NFR BLD: 52 % (ref 36–65)
NEUTS SEG NFR BLD: 5.08 K/UL (ref 1.5–8.1)
NRBC BLD-RTO: 0 PER 100 WBC
PLATELET # BLD AUTO: 221 K/UL (ref 138–453)
PMV BLD AUTO: 10.6 FL (ref 8.1–13.5)
POTASSIUM SERPL-SCNC: 4.1 MMOL/L (ref 3.7–5.3)
RBC # BLD AUTO: 3.63 M/UL (ref 4.21–5.77)
SODIUM SERPL-SCNC: 140 MMOL/L (ref 136–145)
SPECIMEN DESCRIPTION: NORMAL
WBC OTHER # BLD: 9.8 K/UL (ref 3.5–11.3)

## 2024-05-19 PROCEDURE — 85520 HEPARIN ASSAY: CPT

## 2024-05-19 PROCEDURE — 6360000002 HC RX W HCPCS: Performed by: STUDENT IN AN ORGANIZED HEALTH CARE EDUCATION/TRAINING PROGRAM

## 2024-05-19 PROCEDURE — 99232 SBSQ HOSP IP/OBS MODERATE 35: CPT | Performed by: INTERNAL MEDICINE

## 2024-05-19 PROCEDURE — 6370000000 HC RX 637 (ALT 250 FOR IP): Performed by: STUDENT IN AN ORGANIZED HEALTH CARE EDUCATION/TRAINING PROGRAM

## 2024-05-19 PROCEDURE — 80048 BASIC METABOLIC PNL TOTAL CA: CPT

## 2024-05-19 PROCEDURE — 99239 HOSP IP/OBS DSCHRG MGMT >30: CPT | Performed by: INTERNAL MEDICINE

## 2024-05-19 PROCEDURE — 36415 COLL VENOUS BLD VENIPUNCTURE: CPT

## 2024-05-19 PROCEDURE — 6370000000 HC RX 637 (ALT 250 FOR IP)

## 2024-05-19 PROCEDURE — 85025 COMPLETE CBC W/AUTO DIFF WBC: CPT

## 2024-05-19 RX ORDER — HYDROCODONE BITARTRATE AND ACETAMINOPHEN 5; 325 MG/1; MG/1
1 TABLET ORAL EVERY 6 HOURS PRN
Qty: 28 TABLET | Refills: 0 | Status: SHIPPED | OUTPATIENT
Start: 2024-05-19 | End: 2024-05-26

## 2024-05-19 RX ADMIN — PANTOPRAZOLE SODIUM 40 MG: 40 TABLET, DELAYED RELEASE ORAL at 05:40

## 2024-05-19 RX ADMIN — APIXABAN 5 MG: 5 TABLET, FILM COATED ORAL at 12:45

## 2024-05-19 RX ADMIN — HEPARIN SODIUM 13 UNITS/KG/HR: 10000 INJECTION, SOLUTION INTRAVENOUS at 01:02

## 2024-05-19 RX ADMIN — OXYCODONE 5 MG: 5 TABLET ORAL at 10:39

## 2024-05-19 RX ADMIN — CYCLOBENZAPRINE 10 MG: 10 TABLET, FILM COATED ORAL at 04:22

## 2024-05-19 RX ADMIN — GABAPENTIN 600 MG: 600 TABLET, FILM COATED ORAL at 10:39

## 2024-05-19 RX ADMIN — APIXABAN 5 MG: 5 TABLET, FILM COATED ORAL at 11:28

## 2024-05-19 RX ADMIN — ACETAMINOPHEN 650 MG: 325 TABLET ORAL at 04:22

## 2024-05-19 RX ADMIN — OXYCODONE 5 MG: 5 TABLET ORAL at 04:22

## 2024-05-19 RX ADMIN — METOPROLOL SUCCINATE 25 MG: 25 TABLET, EXTENDED RELEASE ORAL at 10:40

## 2024-05-19 ASSESSMENT — PAIN SCALES - WONG BAKER
WONGBAKER_NUMERICALRESPONSE: HURTS A LITTLE BIT

## 2024-05-19 ASSESSMENT — PAIN - FUNCTIONAL ASSESSMENT
PAIN_FUNCTIONAL_ASSESSMENT: ACTIVITIES ARE NOT PREVENTED

## 2024-05-19 ASSESSMENT — PAIN DESCRIPTION - DESCRIPTORS
DESCRIPTORS: ACHING
DESCRIPTORS: CRAMPING;BURNING
DESCRIPTORS: ACHING

## 2024-05-19 ASSESSMENT — PAIN DESCRIPTION - ORIENTATION
ORIENTATION: LEFT

## 2024-05-19 ASSESSMENT — PAIN SCALES - GENERAL
PAINLEVEL_OUTOF10: 7
PAINLEVEL_OUTOF10: 8
PAINLEVEL_OUTOF10: 1
PAINLEVEL_OUTOF10: 8

## 2024-05-19 ASSESSMENT — PAIN DESCRIPTION - LOCATION
LOCATION: LEG

## 2024-05-19 NOTE — DISCHARGE INSTRUCTIONS
You came in with pain and swelling in your left leg after the recent surgery. You were found to have a clot in your leg.  You are being discharge on blood thinner Elliquis. Take as written. Keep using compression stockings and elevate your leg as discussed. Call the vascular surgery office and make an appointment for follow up. They plan to repeat the scan in 3 months to see progression/resolution of the clot.  Also, call and make an appointment with the neurosurgery in a week.     If you begin to experience any symptoms such as chest pain shortness of breath nausea vomiting dizziness drowsiness abdominal pain loss of consciousness or any other symptoms you find concerning please come to the ED for follow-up evaluation.    If you have been given medication please take them as prescribed. Do not take more medication than recommended at any given time.     Please follow-up with your primary care provider within 5 to 7 days for continued care.     Please feel free return to the hospital if your symptoms worsen or any new concerning symptoms develop.  Follow-up with your primary care physician as needed for all other the concerns.

## 2024-05-19 NOTE — PLAN OF CARE
Problem: Discharge Planning  Goal: Discharge to home or other facility with appropriate resources  5/19/2024 1323 by Anne Escamilla RN  Outcome: Adequate for Discharge     Problem: Pain  Goal: Verbalizes/displays adequate comfort level or baseline comfort level  5/19/2024 1323 by Anne Escamilla RN  Outcome: Adequate for Discharge     Problem: Safety - Adult  Goal: Free from fall injury  5/19/2024 1323 by Anne Escamilla RN  Outcome: Adequate for Discharge
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
## 2024-05-19 NOTE — PLAN OF CARE
Problem: Discharge Planning  Goal: Discharge to home or other facility with appropriate resources  5/19/2024 0246 by Natty Esparza RN  Outcome: Progressing  5/18/2024 1836 by Anne Escamilla RN  Outcome: Progressing     Problem: Pain  Goal: Verbalizes/displays adequate comfort level or baseline comfort level  5/19/2024 0246 by Natty Esparza RN  Outcome: Progressing  5/18/2024 1836 by Anne Escamilla RN  Outcome: Progressing     Problem: Safety - Adult  Goal: Free from fall injury  5/19/2024 0246 by Natty Esparza RN  Outcome: Progressing  5/18/2024 1836 by Anne Escamilla RN  Outcome: Progressing

## 2024-05-20 ENCOUNTER — TELEPHONE (OUTPATIENT)
Dept: FAMILY MEDICINE CLINIC | Age: 44
End: 2024-05-20

## 2024-05-20 DIAGNOSIS — I82.4Z2 DVT, LOWER EXTREMITY, DISTAL, ACUTE, LEFT (HCC): Primary | ICD-10-CM

## 2024-05-20 DIAGNOSIS — G89.29 OTHER CHRONIC PAIN: ICD-10-CM

## 2024-05-20 RX ORDER — MELOXICAM 7.5 MG/1
7.5 TABLET ORAL DAILY PRN
Qty: 90 TABLET | Refills: 1 | OUTPATIENT
Start: 2024-05-20 | End: 2025-05-20

## 2024-05-20 NOTE — TELEPHONE ENCOUNTER
Care Transitions Initial Follow Up Call    Outreach made within 2 business days of discharge: Yes    Patient: Xander Bell Patient : 1980   MRN: [id number]  Reason for Admission: DVT  Discharge Date: 24       Spoke with: Abdulaziz    Discharge department/facility: [facility] Interactive Patient Contact:  Was patient able to fill all prescriptions: Yes  Was patient instructed to bring all medications to the follow-up visit: Yes  Is patient taking all medications as directed in the discharge summary? Yes  Does patient understand their discharge instructions: Yes  Does patient have questions or concerns that need addressed prior to 7-14 day follow up office visit: no    Scheduled appointment with PCP within 7-14 days    Follow Up  Future Appointments   Date Time Provider Department Center   2024 11:00 AM Edith Magana APRN - CNP [facility]   2024  1:30 PM Reba Hidalgo APRN - CNP [facility]   2024 11:20 AM Sherrell Snyder APRN - CNP [facility]   2024 10:30 AM Jamila Marino DO [facility]   2024 10:00 AM Reba Hidalgo APRN - CNP [facility]   2024 10:15 AM [facility]   2024 10:45 AM Enrike Ledesma MD [facility]       LIDIA ISLAS MA

## 2024-05-20 NOTE — TELEPHONE ENCOUNTER
Patient can no be on Meloxicam and Eliquis - will discuss tomorrow at appointment.  Please update patient.

## 2024-05-20 NOTE — TELEPHONE ENCOUNTER
LOV 01/11/2024   RTO 05/21/2024  LRF 08/29/2023          Controlled Substance Monitoring:    Acute and Chronic Pain Monitoring:   RX Monitoring Attestation Periodic Controlled Substance Monitoring   9/4/2015   9:27 AM The Prescription Monitoring Report for this patient was reviewed today. Possible medication side effects, risk of tolerance and/or dependence, and alternative treatments discussed;No signs of potential drug abuse or diversion identified.

## 2024-05-21 ENCOUNTER — OFFICE VISIT (OUTPATIENT)
Dept: FAMILY MEDICINE CLINIC | Age: 44
End: 2024-05-21
Payer: MEDICAID

## 2024-05-21 ENCOUNTER — OFFICE VISIT (OUTPATIENT)
Dept: NEUROSURGERY | Age: 44
End: 2024-05-21

## 2024-05-21 VITALS
TEMPERATURE: 97.8 F | BODY MASS INDEX: 34.17 KG/M2 | OXYGEN SATURATION: 98 % | DIASTOLIC BLOOD PRESSURE: 60 MMHG | SYSTOLIC BLOOD PRESSURE: 112 MMHG | WEIGHT: 245 LBS | HEART RATE: 84 BPM

## 2024-05-21 VITALS
HEIGHT: 71 IN | BODY MASS INDEX: 34.16 KG/M2 | WEIGHT: 244 LBS | DIASTOLIC BLOOD PRESSURE: 77 MMHG | SYSTOLIC BLOOD PRESSURE: 116 MMHG | TEMPERATURE: 99.4 F | HEART RATE: 120 BPM

## 2024-05-21 DIAGNOSIS — Z98.890 POST-OPERATIVE STATE: ICD-10-CM

## 2024-05-21 DIAGNOSIS — M21.372 LEFT FOOT DROP: ICD-10-CM

## 2024-05-21 DIAGNOSIS — I82.412 ACUTE DEEP VEIN THROMBOSIS (DVT) OF FEMORAL VEIN OF LEFT LOWER EXTREMITY (HCC): ICD-10-CM

## 2024-05-21 DIAGNOSIS — Z09 HOSPITAL DISCHARGE FOLLOW-UP: Primary | ICD-10-CM

## 2024-05-21 DIAGNOSIS — G57.32 PERONEAL NEUROPATHY AT KNEE, LEFT: Primary | ICD-10-CM

## 2024-05-21 PROCEDURE — 99214 OFFICE O/P EST MOD 30 MIN: CPT

## 2024-05-21 PROCEDURE — 1111F DSCHRG MED/CURRENT MED MERGE: CPT

## 2024-05-21 PROCEDURE — 99024 POSTOP FOLLOW-UP VISIT: CPT | Performed by: NURSE PRACTITIONER
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
## 2024-05-21 NOTE — PROGRESS NOTES
[facility]  [address]  [address]  [address]  [facility] [address]  Dept: [phone]    Patient:  Xander Bell  YOB: 1980  Date: 5/21/24    The patient is a 43 y.o. male who presents today for consult of the following problems:     Chief Complaint   Patient presents with    Post-Op Check     Peroneal Nerve decompression          HPI:     Xander Bell is a 43 y.o. male who presents to the office for post-op evaluation s/p left peroneal nerve decompression.  Patient did have somewhat complicated postoperative course, developed swelling in leg, and was found to have femoral DVT.  Is currently on twice daily Eliquis.  Was evaluated by vascular surgeon, recommended for anticoagulation for 3 months, currently on Eliquis.  Patient did have some erythema surrounding incision, was evaluated by infectious disease, and it was attributed to DVT.  Postop pain has been controlled.  Patient does report continued cramping to left calf, as well as anterior left thigh.  Swelling is dependent, improved with elevation.  Denies any drainage, fevers or chills.    Sensation to left lower extremity improving  Strength 5/5: With exception of 0/5 dorsiflexion and EHL, 3/5 plantarflexion  Incision CDI, persistent surrounding erythema-see image under media, stable from hospital    Date of surgery: 5/10/2024    Assessment and Plan:     1. 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 Left foot drop    3. Post-operative state         Plan: Continue Eliquis, follow-up with PCP and vascular specialist as planned.  Continue postop pain control, decrease use as tolerated.  Follow-up with pain management as planned.  Will have patient return in 2-3 weeks for incision recheck.  Maintain postop visit in 6 weeks with Dr. Marino as well.    Followup: Return in about 2 weeks (around 6/4/2024), or if symptoms worsen or fail to

## 2024-05-22 LAB
MICROORGANISM SPEC CULT: NORMAL
MICROORGANISM SPEC CULT: NORMAL
SERVICE CMNT-IMP: NORMAL
SERVICE CMNT-IMP: NORMAL
SPECIMEN DESCRIPTION: NORMAL
SPECIMEN DESCRIPTION: NORMAL
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
## 2024-05-29 NOTE — PROGRESS NOTES
Physician Progress Note      PATIENT:               ZENA EGAN  [facility] #:                  [id number]  :                       1980  ADMIT DATE:       2024 12:20 PM  DISCH DATE:        2024 1:23 PM  RESPONDING  PROVIDER #:        ASAD HUBBARD          QUERY TEXT:    Pt admitted with DVT and has Acute deep vein thrombosis (DVT) of femoral vein   of left lower extremity likely secondary to recent peroneal nerve   decompression on 5/10 documented. If possible, please document in progress   notes and discharge summary further specificity regarding the DVT to include   POA status, laterality, acuity/chronicity and vessels affected/involved:    The medical record reflects the following:  Risk Factors: Peroneal nerve decompression on 5/10, HTN, everyday smoker,  Clinical Indicators: DP palpable and PT nonpalpable on the left.  PT and DP   present on Doppler on left,  Absent sensation to left foot.  vascular   duplex-acute non-occlusive deep vein thrombosis in the left middle femoral   vein.  --Vascular consult :Duplex reviewed reveals short segment femoral vein   non-occlusive deep vein thrombosis.  Likely provoked given recent surgery and   decreased mobility.  --ID consult  Concern for surgical site infection s/p left peroneal nerve   decompression. Left femoral DVT  --Neurosurgery consult -  POD#7 s/p left peroneal nerve decompression,   presents with increased pain, edema and warmth to left leg. He had left   peroneal nerve decompression with DR Marino on 5/10/2024 and was sent home same   day on PO antibiotics for 3 doses (Keflex). He reports that 4 days post op he   started to have increased pain edema and warmth.  Treatment:  Vascular Duplex, ID, vascular and neuro surgery consults, 3-day   Heparin Infusion, Eliquis    Thank you for your time,  Ophelia Collins RN, BSN CDS  [email or web address]  Options provided:  -- Acute Acute deep vein thrombosis of femoral vein of left lower extremity

## 2024-06-03 RX ORDER — APIXABAN 5 MG/1
TABLET, FILM COATED ORAL
Qty: 26 TABLET | Refills: 0 | OUTPATIENT
Start: 2024-06-03

## 2024-06-13 ENCOUNTER — OFFICE VISIT (OUTPATIENT)
Dept: PAIN MANAGEMENT | Age: 44
End: 2024-06-13
Payer: MEDICAID

## 2024-06-13 VITALS — BODY MASS INDEX: 34.3 KG/M2 | RESPIRATION RATE: 98 BRPM | WEIGHT: 245 LBS | HEIGHT: 71 IN | HEART RATE: 84 BPM

## 2024-06-13 DIAGNOSIS — G57.72 COMPLEX REGIONAL PAIN SYNDROME TYPE 2 OF LEFT LOWER EXTREMITY: ICD-10-CM

## 2024-06-13 DIAGNOSIS — G89.29 OTHER CHRONIC PAIN: ICD-10-CM

## 2024-06-13 PROCEDURE — 99213 OFFICE O/P EST LOW 20 MIN: CPT | Performed by: NURSE PRACTITIONER

## 2024-06-13 PROCEDURE — 4004F PT TOBACCO SCREEN RCVD TLK: CPT | Performed by: NURSE PRACTITIONER

## 2024-06-13 PROCEDURE — 1111F DSCHRG MED/CURRENT MED MERGE: CPT | Performed by: NURSE PRACTITIONER

## 2024-06-13 PROCEDURE — G8417 CALC BMI ABV UP PARAM F/U: HCPCS | Performed by: NURSE PRACTITIONER

## 2024-06-13 PROCEDURE — G8427 DOCREV CUR MEDS BY ELIG CLIN: HCPCS | Performed by: NURSE PRACTITIONER

## 2024-06-13 RX ORDER — CYCLOBENZAPRINE HCL 10 MG
10 TABLET ORAL 2 TIMES DAILY PRN
Qty: 60 TABLET | Refills: 2 | Status: SHIPPED | OUTPATIENT
Start: 2024-06-13 | End: 2024-09-11

## 2024-06-13 RX ORDER — GABAPENTIN 600 MG/1
600 TABLET ORAL 3 TIMES DAILY
Qty: 90 TABLET | Refills: 2 | Status: SHIPPED | OUTPATIENT
Start: 2024-06-13 | End: 2024-09-11

## 2024-06-13 ASSESSMENT — ENCOUNTER SYMPTOMS
SHORTNESS OF BREATH: 0
CONSTIPATION: 0
COUGH: 0

## 2024-06-13 NOTE — PROGRESS NOTES
History:   Diagnosis Date   • Anxiety and depression    • COVID-19 vaccine series not administered    • Hypertension    • Scalp hematoma 07/29/2020   • Stomach ache     \"most of the time\"   • Under care of service provider     GI Dr Aleman / last visit 4/18/24   • Under care of service provider     Pain Management Dr. Katz / last visit 4/9/24   • Under care of service provider     Neurosurgery Dr. Marino / last visit 4/4/24   • Under care of service provider     Ortho Dr. Abelino Pacheco / last visit Jan 2024   • Wellness examination     PCP SIRI Trevino / last visit Feb 2024       Past Surgical History:   Procedure Laterality Date   • HERNIA REPAIR  12/03/2003    umbilical   • INGUINAL HERNIA REPAIR Right 10/15/2015   • NERVE SURGERY Left 5/10/2024    LEFT PERONEAL NERVE DECOMPRESSION performed by Jamila Marino DO at [facility] OR   • PERONEAL NERVE DECOMPRESSION      LEFT PERONEAL NERVE DECOMPRESSION       Allergies   Allergen Reactions   • Aspirin      Nose bleeds, thins blood         Current Outpatient Medications:   •  apixaban (ELIQUIS) 5 MG TABS tablet, Take 1 tablet by mouth 2 times daily, Disp: 180 tablet, Rfl: 0  •  apixaban (ELIQUIS) 5 MG TABS tablet, Take 2 tablets by mouth 2 times daily for 13 doses, Disp: 26 tablet, Rfl: 0  •  gabapentin (NEURONTIN) 600 MG tablet, Take 1 tablet by mouth 3 times daily for 90 days., Disp: 90 tablet, Rfl: 2  •  metoprolol succinate (TOPROL XL) 25 MG extended release tablet, Take 1 tablet by mouth daily, Disp: 90 tablet, Rfl: 1  •  omeprazole (PRILOSEC) 20 MG delayed release capsule, Take 1 capsule by mouth every morning (before breakfast), Disp: 90 capsule, Rfl: 1  •  DULoxetine (CYMBALTA) 30 MG extended release capsule, Take 1 capsule by mouth daily Prescribed by psych at [facility]- Increased total daily dose to 90mg, Disp: , Rfl:   •  valACYclovir (VALTREX) 500 MG tablet, Take 1 tablet by mouth daily as needed (onset of lesions), Disp: 30 tablet, Rfl: 3  •

## 2024-06-20 ENCOUNTER — OFFICE VISIT (OUTPATIENT)
Dept: NEUROSURGERY | Age: 44
End: 2024-06-20

## 2024-06-20 VITALS
SYSTOLIC BLOOD PRESSURE: 130 MMHG | OXYGEN SATURATION: 97 % | HEART RATE: 92 BPM | BODY MASS INDEX: 34.02 KG/M2 | TEMPERATURE: 97.2 F | DIASTOLIC BLOOD PRESSURE: 84 MMHG | HEIGHT: 71 IN | WEIGHT: 243 LBS

## 2024-06-20 DIAGNOSIS — G57.32 PERONEAL NEUROPATHY AT KNEE, LEFT: Primary | ICD-10-CM

## 2024-06-20 DIAGNOSIS — Z98.890 POST-OPERATIVE STATE: ICD-10-CM

## 2024-06-20 DIAGNOSIS — I82.4Z2 DVT, LOWER EXTREMITY, DISTAL, ACUTE, LEFT (HCC): ICD-10-CM

## 2024-06-20 DIAGNOSIS — T81.89XD DELAYED SURGICAL WOUND HEALING, SUBSEQUENT ENCOUNTER: ICD-10-CM

## 2024-06-20 DIAGNOSIS — Z72.0 TOBACCO USE: ICD-10-CM

## 2024-06-20 PROCEDURE — 99024 POSTOP FOLLOW-UP VISIT: CPT | Performed by: NURSE PRACTITIONER

## 2024-06-20 RX ORDER — SULFAMETHOXAZOLE AND TRIMETHOPRIM 800; 160 MG/1; MG/1
1 TABLET ORAL 2 TIMES DAILY
Qty: 28 TABLET | Refills: 0 | Status: SHIPPED | OUTPATIENT
Start: 2024-06-20 | End: 2024-07-04

## 2024-06-20 RX ORDER — NICOTINE 21 MG/24HR
1 PATCH, TRANSDERMAL 24 HOURS TRANSDERMAL EVERY 24 HOURS
Qty: 30 PATCH | Refills: 3 | Status: SHIPPED | OUTPATIENT
Start: 2024-06-20 | End: 2025-06-20

## 2024-06-20 NOTE — PROGRESS NOTES
[facility]  [address]  [address], [address]  [facility] [address]  Dept: [phone]    Patient:  Xander Bell  YOB: 1980  Date: 6/20/24    The patient is a 43 y.o. male who presents today for consult of the following problems:     Chief Complaint   Patient presents with    Follow-up     Incision check          HPI:     Xander Bell is a 43 y.o. male who presents to the office for post-op evaluation/incision recheck s/p left peroneal nerve decompression.  Patient did have somewhat complicated postoperative course, developed swelling in leg, and was found to have femoral DVT.  Is currently on twice daily Eliquis.  Was evaluated by vascular surgeon, recommended for anticoagulation for 3 months, currently on Eliquis.  Patient did have some erythema surrounding incision, was evaluated by infectious disease, and it was attributed to DVT.   Patient does report continued cramping to left calf, as well as anterior left thigh, as well as left heel.  Swelling is dependent, improved with elevation.  Denies any drainage, fevers or chills.     Presents today for repeat evaluation of incision.  Does continue to have some surrounding erythema, reports that his incision has slightly opened in the last week or 2.  Reports that he will have some occasional serous drainage, no obvious purulent drainage.  No fevers or chills.  No increase in pain or significant increase in tenderness to lindsay-incisional palpation.    Sensation to left lower extremity improving  Strength 5/5: With exception of 0/5 dorsiflexion and 1/5 EHL, 3/5 plantarflexion  Incision CDI, persistent surrounding erythema-see image under media    Date of surgery: 5/10/2024    Assessment and Plan:     1. 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 Post-operative state    3. DVT, lower extremity, distal, acute, left (HCC)           Plan: Has had

## 2024-06-21 ENCOUNTER — TELEPHONE (OUTPATIENT)
Dept: NEUROSURGERY | Age: 44
End: 2024-06-21

## 2024-06-21 NOTE — TELEPHONE ENCOUNTER
----- Message from KYA Hopper CNP sent at 6/20/2024  2:36 PM EDT -----  Regarding: follow up from appt today  I reviewed patient with Dr. Marino, he would like him to be on a 2-week course of an antibiotic, and would like him to continue working on smoking cessation.  He would also like the patient to be evaluated by wound care.  I have sent antibiotic, nicotine patches to the pharmacy.  Referral has also been placed for wound care evaluation.  Keep upcoming postop visit with Dr. Marino as planned as well.  Can you please reach out and notify patient, thank you.

## 2024-06-21 NOTE — TELEPHONE ENCOUNTER
Patient advised: Dr. Marino, he would like him to be on a 2-week course of an antibiotic, and would like him to continue working on smoking cessation.  He would also like the patient to be evaluated by wound care.  I have sent antibiotic, nicotine patches to the pharmacy.  Referral has also been placed for wound care evaluation.  Keep upcoming postop visit with Dr. Marino as planned as well.   Patient does already have an appt. Set up with Wound care.

## 2024-06-24 NOTE — DISCHARGE INSTRUCTIONS
emergency room.     The information contained in the After Visit Summary has been reviewed with me, the patient and/or responsible adult, by my health care provider(s). I had the opportunity to ask questions regarding this information. I have elected to receive;      []After Visit Summary  [x]Comprehensive Discharge Instruction      Patient signature______________________________________Date:________  Electronically signed by Doris Contreras RN on 6/26/2024 at 9:53 AM    Electronically signed by KYA KWOK - CNP on 6/26/2024 at 8:59 AM

## 2024-06-26 ENCOUNTER — HOSPITAL ENCOUNTER (OUTPATIENT)
Dept: WOUND CARE | Age: 44
Discharge: HOME | End: 2024-06-26
Payer: MEDICAID

## 2024-06-26 VITALS
BODY MASS INDEX: 34.02 KG/M2 | TEMPERATURE: 96.9 F | DIASTOLIC BLOOD PRESSURE: 72 MMHG | HEART RATE: 96 BPM | SYSTOLIC BLOOD PRESSURE: 143 MMHG | RESPIRATION RATE: 18 BRPM | HEIGHT: 71 IN | WEIGHT: 243 LBS

## 2024-06-26 DIAGNOSIS — T81.89XD NONHEALING SURGICAL WOUND, SUBSEQUENT ENCOUNTER: ICD-10-CM

## 2024-06-26 DIAGNOSIS — I87.2 VENOUS INSUFFICIENCY OF BOTH LOWER EXTREMITIES: ICD-10-CM

## 2024-06-26 DIAGNOSIS — S81.802D LEG WOUND, LEFT, SUBSEQUENT ENCOUNTER: Primary | ICD-10-CM

## 2024-06-26 PROCEDURE — 87075 CULTR BACTERIA EXCEPT BLOOD: CPT

## 2024-06-26 PROCEDURE — 99203 OFFICE O/P NEW LOW 30 MIN: CPT | Performed by: NURSE PRACTITIONER

## 2024-06-26 PROCEDURE — 87205 SMEAR GRAM STAIN: CPT

## 2024-06-26 PROCEDURE — 87070 CULTURE OTHR SPECIMN AEROBIC: CPT

## 2024-06-26 PROCEDURE — 11042 DBRDMT SUBQ TIS 1ST 20SQCM/<: CPT | Performed by: NURSE PRACTITIONER

## 2024-06-26 PROCEDURE — 99213 OFFICE O/P EST LOW 20 MIN: CPT

## 2024-06-26 PROCEDURE — 11042 DBRDMT SUBQ TIS 1ST 20SQCM/<: CPT

## 2024-06-26 RX ORDER — LIDOCAINE HYDROCHLORIDE 40 MG/ML
SOLUTION TOPICAL ONCE
OUTPATIENT
Start: 2024-06-26 | End: 2024-06-26

## 2024-06-26 RX ORDER — LIDOCAINE HYDROCHLORIDE 20 MG/ML
JELLY TOPICAL ONCE
OUTPATIENT
Start: 2024-06-26 | End: 2024-06-26

## 2024-06-26 RX ORDER — LIDOCAINE HYDROCHLORIDE 40 MG/ML
SOLUTION TOPICAL ONCE
Status: DISCONTINUED | OUTPATIENT
Start: 2024-06-26 | End: 2024-06-27 | Stop reason: HOSPADM

## 2024-06-26 ASSESSMENT — ENCOUNTER SYMPTOMS
VOMITING: 0
SHORTNESS OF BREATH: 0
RHINORRHEA: 0
NAUSEA: 0
DIARRHEA: 0
COUGH: 0

## 2024-06-26 ASSESSMENT — PAIN SCALES - GENERAL: PAINLEVEL_OUTOF10: 6

## 2024-06-26 NOTE — PLAN OF CARE
Problem: Pain  Goal: Verbalizes/displays adequate comfort level or baseline comfort level  Outcome: Progressing     Problem: ABCDS Injury Assessment  Goal: Absence of physical injury  Outcome: Progressing     Problem: Cognitive:  Goal: Knowledge of wound care  Description: Knowledge of wound care  Outcome: Progressing

## 2024-06-26 NOTE — PROGRESS NOTES
[facility]   Progress Note and Procedure Note      Xander Bell  MEDICAL RECORD NUMBER:  [id number]  AGE: 43 y.o.   GENDER: male  : 1980  EPISODE DATE:  2024    Subjective:     Chief Complaint   Patient presents with    Wound Check     Left lower leg         HISTORY of PRESENT ILLNESS HPI     Xander Bell is a 43 y.o. male who presents today for wound/ulcer evaluation.   History of Wound Context: presents for initial evaluation of left lower leg wound after peroneal nerve decompression 2024. Saw neurosurgery 2024 was started on bactrim. Appearance of surrounding skin has improved. Wound with devitalized tissue, is 1.7 cm deep, seroma noted. Tissue culture taken. Will start vashe moist to moist daily and have him see general surgery in wound clinic next week. He may need surgical debridement in OR with wound vac.   Wound/Ulcer Pain Timing/Severity: intermittent  Quality of pain: sharp, burning  Severity:  5 / 10   Modifying Factors: Pain worsens with touching  Associated Signs/Symptoms: edema, erythema, drainage, and pain    Ulcer Identification:  Ulcer Type: non-healing surgical  Contributing Factors: edema, venous stasis, obesity, and smoking         PAST MEDICAL HISTORY        Diagnosis Date    Anxiety and depression     COVID-19 vaccine series not administered     Hypertension     Scalp hematoma 2020    Stomach ache     \"most of the time\"    Under care of service provider     GI Dr Aleman / last visit 24    Under care of service provider     Pain Management Dr. Katz / last visit 24    Under care of service provider     Neurosurgery Dr. Marino / last visit 24    Under care of service provider     Ortho Dr. Abelino Pacheco / last visit 2024    Wellness examination     PCP SIRI Trevino / last visit 2024       PAST SURGICAL HISTORY    Past Surgical History:   Procedure Laterality Date    HERNIA REPAIR  2003

## 2024-06-30 LAB
MICROORGANISM SPEC CULT: ABNORMAL
MICROORGANISM SPEC CULT: ABNORMAL
MICROORGANISM/AGENT SPEC: ABNORMAL
SERVICE CMNT-IMP: ABNORMAL
SPECIMEN DESCRIPTION: ABNORMAL

## 2024-07-02 ENCOUNTER — HOSPITAL ENCOUNTER (OUTPATIENT)
Dept: WOUND CARE | Age: 44
Discharge: HOME | End: 2024-07-02
Payer: MEDICAID

## 2024-07-02 VITALS
WEIGHT: 243 LBS | BODY MASS INDEX: 34.02 KG/M2 | HEIGHT: 71 IN | SYSTOLIC BLOOD PRESSURE: 131 MMHG | DIASTOLIC BLOOD PRESSURE: 59 MMHG | HEART RATE: 105 BPM | RESPIRATION RATE: 20 BRPM | TEMPERATURE: 98.2 F

## 2024-07-02 DIAGNOSIS — T81.89XD NONHEALING SURGICAL WOUND, SUBSEQUENT ENCOUNTER: ICD-10-CM

## 2024-07-02 DIAGNOSIS — I87.2 VENOUS INSUFFICIENCY OF BOTH LOWER EXTREMITIES: ICD-10-CM

## 2024-07-02 DIAGNOSIS — S81.802D LEG WOUND, LEFT, SUBSEQUENT ENCOUNTER: Primary | ICD-10-CM

## 2024-07-02 PROCEDURE — 11042 DBRDMT SUBQ TIS 1ST 20SQCM/<: CPT

## 2024-07-02 RX ORDER — LIDOCAINE HYDROCHLORIDE 40 MG/ML
SOLUTION TOPICAL ONCE
Status: COMPLETED | OUTPATIENT
Start: 2024-07-02 | End: 2024-07-02

## 2024-07-02 RX ORDER — LIDOCAINE HYDROCHLORIDE 20 MG/ML
JELLY TOPICAL ONCE
OUTPATIENT
Start: 2024-07-02 | End: 2024-07-02

## 2024-07-02 RX ORDER — LIDOCAINE HYDROCHLORIDE 40 MG/ML
SOLUTION TOPICAL ONCE
OUTPATIENT
Start: 2024-07-02 | End: 2024-07-02

## 2024-07-02 RX ADMIN — LIDOCAINE HYDROCHLORIDE 10 ML: 40 SOLUTION TOPICAL at 14:06

## 2024-07-02 ASSESSMENT — ENCOUNTER SYMPTOMS
NAUSEA: 0
COUGH: 0
SHORTNESS OF BREATH: 0
VOMITING: 0
RHINORRHEA: 0
DIARRHEA: 0

## 2024-07-02 ASSESSMENT — PAIN DESCRIPTION - ORIENTATION: ORIENTATION: LEFT;LOWER

## 2024-07-02 ASSESSMENT — PAIN DESCRIPTION - ONSET: ONSET: ON-GOING

## 2024-07-02 ASSESSMENT — PAIN DESCRIPTION - FREQUENCY: FREQUENCY: INTERMITTENT

## 2024-07-02 ASSESSMENT — PAIN DESCRIPTION - LOCATION: LOCATION: LEG

## 2024-07-02 ASSESSMENT — PAIN SCALES - GENERAL: PAINLEVEL_OUTOF10: 4

## 2024-07-02 ASSESSMENT — PAIN DESCRIPTION - DESCRIPTORS: DESCRIPTORS: BURNING

## 2024-07-02 NOTE — PLAN OF CARE
Problem: Pain  Goal: Verbalizes/displays adequate comfort level or baseline comfort level  Outcome: Progressing     Problem: Cognitive:  Goal: Knowledge of wound care  Description: Knowledge of wound care  Outcome: Progressing  Goal: Understands risk factors for wounds  Description: Understands risk factors for wounds  Outcome: Progressing     Problem: Compression therapy:  Goal: Will be free from complications associated with compression therapy  Description: Will be free from complications associated with compression therapy  Outcome: Progressing        SEDA GLEASON    Patient Age: 59 year old   Refill request by: Phone.  Caller informed to check with the pharmacy later for their refill.  If problems arise, we will contact patient.  Refill to be: ePrescribed to [facility] [place] [facility]    Medication requested to be refilled:    amLODIPine (NORVASC) 10 MG tablet     Patient states she needs 1 more refill until her appointment on 5-6-19.  Patient states she will be out of medication tomorrow.  Message confirmed.    Sending to Dr. Marinelli's pool.             Next and Last Visit with Provider and Department  Last visit with this provider: 4/1/2019  Last visit with this department: 4/1/2019    Next visit with this provider: 5/6/2019  Next visit with this department: 5/6/2019    WEIGHT AND HEIGHT:   Wt Readings from Last 1 Encounters:   08/09/18 92.5 kg (204 lb)     Ht Readings from Last 1 Encounters:   08/09/18 5' 7\" (1.702 m)     BMI Readings from Last 1 Encounters:   08/09/18 31.95 kg/m²       ALLERGIES: no known allergies.  Current Outpatient Medications   Medication   • amLODIPine (NORVASC) 10 MG tablet   • lamotrigine (LAMICTAL) 200 MG tablet   • methylphenidate (CONCERTA) 54 MG CR tablet   • sertraline (ZOLOFT) 100 MG tablet   • venlafaxine XR (EFFEXOR XR) 75 MG 24 hr capsule   • meloxicam (MOBIC) 15 MG tablet   • lisinopril (ZESTRIL) 10 MG tablet   • solifenacin (VESICARE) 5 MG tablet     No current facility-administered medications for this visit.      PHARMACY to use: Shown below          Pharmacy preference(s) on file:   [facility] [id number] [place] [address] - [address]  AT [facility] [address] [place]  [address] [place] [address]  Phone: [phone] Fax: [phone]      CALL BACK INFO: Ok to leave response (including medical information) with family member or on answering machine  ROUTING: Patient's physician/staff        PCP: Shannon Simpson MD         INS: Payor: [employer] / Plan: OPEN ACCESS [id number] / Product Type: POS  MISC   PATIENT ADDRESS:  [address]  [facility] [id number]

## 2024-07-02 NOTE — DISCHARGE INSTRUCTIONS
[facility]  [phone]                               Visit  Discharge Instructions / Physician Orders  DATE: 7/2/2024     Home Care:NONE     SUPPLIES ORDERED THRU:  Not this Week                   DATE LAST SUPPLIED     Wound Location:  Left Lower leg     Cleanse with: Liquid antibacterial soap and water, rinse well      Dressing Orders:  Primary dressing    Vashe moist to moist packing strip- cut strip moisten with the vashe- Use q-tip to tuck strip into deep part of the wound    Secondary dressing   cover with ABD pad wrap with Kerlix        Frequency:  Daily     Additional Orders: Increase protein to diet (meat, cheese, eggs, fish, peanut butter, nuts and beans)  Multivitamin daily     OFFLOADING [] YES  TYPE:                  [] NA     Weekly wound care visits until determined otherwise.     Antibiotic therapy-wound care related YES [] NO [] NA[]     MY CHART []     Smart Device  []                          Your next appointment with the Wound Care Center is in 2 weeks                                                                                                    (Please note your next appointment above and if you are unable to keep, kindly give a 24 hour notice. Thank you.)  If more than 15 min late we cannot guarantee you will be seen due to clinician schedule  Per Policy, Excessive cancellation will call for dismissal from program.  If you experience any of the following, please call the Wound Care Center during business hours:  [phone]     * Increase in Pain  * Temperature over 101  * Increase in drainage from your wound  * Drainage with a foul odor  * Bleeding  * Increase in swelling  * Need for compression bandage changes due to slippage, breakthrough drainage.     If you need medical attention outside of the business hours of the Wound Care Centers please contact your PCP or go to the nearest emergency room.     The information contained in the After

## 2024-07-02 NOTE — PROGRESS NOTES
[facility]   Progress Note and Procedure Note      Xander Bell  MEDICAL RECORD NUMBER:  [id number]  AGE: 43 y.o.   GENDER: male  : 1980  EPISODE DATE:  2024    Subjective:     Chief Complaint   Patient presents with    Wound Check     Left leg         HISTORY of PRESENT ILLNESS HPI     Xander Bell is a 43 y.o. male who presents today for wound/ulcer evaluation.   History of Wound Context: post peroneal nerve decompression infected seroma    Wound/Ulcer Pain Timing/Severity: none  Quality of pain: N/A  Severity:  0 / 10   Modifying Factors: None  Associated Signs/Symptoms: none    Ulcer Identification:  Ulcer Type: non-healing surgical  Contributing Factors: venous stasis         PAST MEDICAL HISTORY        Diagnosis Date    Anxiety and depression     COVID-19 vaccine series not administered     Hypertension     Scalp hematoma 2020    Stomach ache     \"most of the time\"    Under care of service provider     GI Dr Aleman / last visit 24    Under care of service provider     Pain Management Dr. Katz / last visit 24    Under care of service provider     Neurosurgery Dr. Marino / last visit 24    Under care of service provider     Ortho Dr. Abelino Pacheco / last visit 2024    Wellness examination     PCP SIRI Trevino / last visit 2024       PAST SURGICAL HISTORY    Past Surgical History:   Procedure Laterality Date    HERNIA REPAIR  2003    umbilical    INGUINAL HERNIA REPAIR Right 10/15/2015    NERVE SURGERY Left 5/10/2024    LEFT PERONEAL NERVE DECOMPRESSION performed by Jamila Marino DO at [facility] OR    PERONEAL NERVE DECOMPRESSION      LEFT PERONEAL NERVE DECOMPRESSION       FAMILY HISTORY    Family History   Problem Relation Age of Onset    No Known Problems Mother     Diabetes Father     High Blood Pressure Father     High Blood Pressure Paternal Grandmother     Diabetes Paternal Grandfather        SOCIAL HISTORY    Social

## 2024-07-04 DIAGNOSIS — F32.A DEPRESSION, UNSPECIFIED DEPRESSION TYPE: ICD-10-CM

## 2024-07-04 DIAGNOSIS — F41.9 ANXIETY: ICD-10-CM
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
## 2024-07-04 DIAGNOSIS — G57.72 COMPLEX REGIONAL PAIN SYNDROME TYPE 2 OF LEFT LOWER EXTREMITY: ICD-10-CM

## 2024-07-05 NOTE — TELEPHONE ENCOUNTER
Xander Bell is calling to request a refill on the following medication(s):    Medication Request:  Requested Prescriptions     Pending Prescriptions Disp Refills    ELIQUIS 5 MG TABS tablet [Pharmacy Med Name: ELIQUIS 5 MG TABLET] 26 tablet 0     Sig: TAKE 2 TABLETS BY MOUTH TWICE A DAY FOR 13 DOSES       Last Visit Date (If Applicable):  Visit date not found    Next Visit Date:    Visit date not found

## 2024-07-05 NOTE — TELEPHONE ENCOUNTER
LOV 1-11-24   RTO 7-11-24  LRF 8-29-23          Controlled Substance Monitoring:    Acute and Chronic Pain Monitoring:   RX Monitoring Attestation Periodic Controlled Substance Monitoring   9/4/2015   9:27 AM The Prescription Monitoring Report for this patient was reviewed today. Possible medication side effects, risk of tolerance and/or dependence, and alternative treatments discussed;No signs of potential drug abuse or diversion identified.

## 2024-07-08 RX ORDER — APIXABAN 5 MG/1
TABLET, FILM COATED ORAL
Qty: 26 TABLET | Refills: 0 | OUTPATIENT
Start: 2024-07-08

## 2024-07-09 ENCOUNTER — OFFICE VISIT (OUTPATIENT)
Dept: NEUROSURGERY | Age: 44
End: 2024-07-09

## 2024-07-09 VITALS
BODY MASS INDEX: 34.13 KG/M2 | DIASTOLIC BLOOD PRESSURE: 74 MMHG | WEIGHT: 243.8 LBS | SYSTOLIC BLOOD PRESSURE: 108 MMHG | HEART RATE: 79 BPM | HEIGHT: 71 IN | OXYGEN SATURATION: 97 %

## 2024-07-09 DIAGNOSIS — T81.89XD DELAYED SURGICAL WOUND HEALING, SUBSEQUENT ENCOUNTER: ICD-10-CM

## 2024-07-09 DIAGNOSIS — G57.32 PERONEAL NEUROPATHY AT KNEE, LEFT: Primary | ICD-10-CM

## 2024-07-09 DIAGNOSIS — G57.72 COMPLEX REGIONAL PAIN SYNDROME TYPE 2 OF LEFT LOWER EXTREMITY: ICD-10-CM

## 2024-07-09 DIAGNOSIS — T81.30XA WOUND DEHISCENCE: ICD-10-CM

## 2024-07-09 PROBLEM — T81.89XA DELAYED SURGICAL WOUND HEALING: Status: ACTIVE | Noted: 2024-07-09

## 2024-07-09 PROCEDURE — 99024 POSTOP FOLLOW-UP VISIT: CPT | Performed by: NEUROLOGICAL SURGERY

## 2024-07-09 RX ORDER — DULOXETIN HYDROCHLORIDE 60 MG/1
60 CAPSULE, DELAYED RELEASE ORAL DAILY
Qty: 90 CAPSULE | Refills: 1 | Status: SHIPPED | OUTPATIENT
Start: 2024-07-09 | End: 2025-07-09

## 2024-07-09 NOTE — PROGRESS NOTES
Department of Neurosurgery                                                      Follow up visit      History Obtained From:  patient, friend    CHIEF COMPLAINT:         Chief Complaint   Patient presents with    Post-Op Check       HISTORY OF PRESENT ILLNESS:       The patient is a 43 y.o. male who presents for follow up for peroneal neuropathy at left knee, post-operative state, and DVT distal left lower extremity.    Patient reports that the pain in his left leg is 25% better since peroneal decompression in terms of pain. Has had significant improvements to sensitivity and reduction in numbness and tingling. Still with marked weakness in the distal right lower extremity. Not currently wearing a brace. His symptoms of pain as well as numbness and tingling most present in the heel and arch of the foot. Symptoms are aggravated by stretching of the left knee and activity involving the left lower extremity.    He reports that he had swelling around surgical wound after discharge. He presented to the ER afterwards due to this and was found to have femoral DVT. He was started on Eliquis. He states that he was recently unable to fill the Eliquis. He will be seeing vascular surgery.     Reports that over the last month his incision had slightly opened. This was evaluated by wound clinic and antibiotics were started. He packs the wound daily. Complains of pain and pruritus over the open wound. He just finished his antibiotics and will be seeing wound clinic next week.    Patient is utilizing a cane at today's visit to assist with ambulation. Patient admits to continued smoking, approximately 1/2 pack per day. He is utilizing nicotine patches at this time to aid with smoking cessation.    Past Medical History:        Diagnosis Date    Anxiety and depression     COVID-19 vaccine series not administered     Hypertension     Scalp hematoma 07/29/2020    Stomach ache     \"most of the time\"    Under care of service
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
## 2024-07-10 DIAGNOSIS — F41.9 ANXIETY: ICD-10-CM

## 2024-07-10 DIAGNOSIS — F32.A DEPRESSION, UNSPECIFIED DEPRESSION TYPE: ICD-10-CM

## 2024-07-10 DIAGNOSIS — G57.72 COMPLEX REGIONAL PAIN SYNDROME TYPE 2 OF LEFT LOWER EXTREMITY: ICD-10-CM

## 2024-07-10 RX ORDER — DULOXETIN HYDROCHLORIDE 60 MG/1
60 CAPSULE, DELAYED RELEASE ORAL DAILY
Qty: 90 CAPSULE | Refills: 1 | OUTPATIENT
Start: 2024-07-10 | End: 2025-07-10

## 2024-07-11 ENCOUNTER — OFFICE VISIT (OUTPATIENT)
Dept: FAMILY MEDICINE CLINIC | Age: 44
End: 2024-07-11
Payer: MEDICAID

## 2024-07-11 VITALS
TEMPERATURE: 98.2 F | DIASTOLIC BLOOD PRESSURE: 82 MMHG | WEIGHT: 244.4 LBS | HEART RATE: 63 BPM | SYSTOLIC BLOOD PRESSURE: 124 MMHG | BODY MASS INDEX: 34.09 KG/M2 | OXYGEN SATURATION: 98 %

## 2024-07-11 DIAGNOSIS — A60.01 HERPES SIMPLEX INFECTION OF PENIS: ICD-10-CM

## 2024-07-11 DIAGNOSIS — K21.00 GASTROESOPHAGEAL REFLUX DISEASE WITH ESOPHAGITIS WITHOUT HEMORRHAGE: ICD-10-CM

## 2024-07-11 DIAGNOSIS — I82.412 ACUTE DEEP VEIN THROMBOSIS (DVT) OF FEMORAL VEIN OF LEFT LOWER EXTREMITY (HCC): Primary | ICD-10-CM

## 2024-07-11 DIAGNOSIS — R00.0 TACHYCARDIA: ICD-10-CM

## 2024-07-11 DIAGNOSIS — G57.32 PERONEAL NEUROPATHY AT KNEE, LEFT: ICD-10-CM

## 2024-07-11 PROCEDURE — G8427 DOCREV CUR MEDS BY ELIG CLIN: HCPCS

## 2024-07-11 PROCEDURE — 99214 OFFICE O/P EST MOD 30 MIN: CPT

## 2024-07-11 PROCEDURE — 4004F PT TOBACCO SCREEN RCVD TLK: CPT

## 2024-07-11 PROCEDURE — G8417 CALC BMI ABV UP PARAM F/U: HCPCS

## 2024-07-11 RX ORDER — METOPROLOL SUCCINATE 25 MG/1
25 TABLET, EXTENDED RELEASE ORAL DAILY
Qty: 90 TABLET | Refills: 1 | Status: SHIPPED | OUTPATIENT
Start: 2024-07-11

## 2024-07-11 RX ORDER — FAMOTIDINE 20 MG/1
20 TABLET, FILM COATED ORAL NIGHTLY PRN
Qty: 90 TABLET | Refills: 1 | Status: SHIPPED | OUTPATIENT
Start: 2024-07-11 | End: 2025-07-11

## 2024-07-11 RX ORDER — VALACYCLOVIR HYDROCHLORIDE 500 MG/1
500 TABLET, FILM COATED ORAL DAILY PRN
Qty: 30 TABLET | Refills: 3 | Status: SHIPPED | OUTPATIENT
Start: 2024-07-11 | End: 2025-07-11

## 2024-07-11 RX ORDER — OMEPRAZOLE 20 MG/1
20 CAPSULE, DELAYED RELEASE ORAL
Qty: 90 CAPSULE | Refills: 1 | Status: SHIPPED | OUTPATIENT
Start: 2024-07-11

## 2024-07-11 NOTE — PROGRESS NOTES
Xander Bell (:  1980) is a 43 y.o. male,Established patient, here for evaluation of the following chief complaint(s):  ADHD, Gastroesophageal Reflux, and Referral - General (Massage Therapy,  hips and legs)      Assessment & Plan   1. Acute deep vein thrombosis (DVT) of femoral vein of left lower extremity (HCC)  Comments:  Called pharamacy. Stated they were unsure why this was never filled or picked up.  Had order present.  Filling today, and advised patient to start as prescribed  2. Gastroesophageal reflux disease with esophagitis without hemorrhage  -     famotidine (PEPCID) 20 MG tablet; Take 1 tablet by mouth nightly as needed (increased reflux symptoms), Disp-90 tablet, R-1Normal  -     omeprazole (PRILOSEC) 20 MG delayed release capsule; Take 1 capsule by mouth every morning (before breakfast), Disp-90 capsule, R-1Normal  3. Tachycardia  -     metoprolol succinate (TOPROL XL) 25 MG extended release tablet; Take 1 tablet by mouth daily, Disp-90 tablet, R-1Normal  4. Herpes simplex infection of penis  -     valACYclovir (VALTREX) 500 MG tablet; Take 1 tablet by mouth daily as needed (onset of lesions), Disp-30 tablet, R-3Normal  5. 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  Ordered pepcid PRN for increased reflux symptoms   Routine follow up with neurosurg, psych, and pain management as scheduled  Would advise PT post surgical     Return in about 2 months (around 2024).       Subjective   Patient here for follow up for chronic conditions.    He did recently have surgery and is following with wound therapy. Upon discussion today, it was noted that patient stopped the Eliquis for his DVT sooner than prescribed, as he states that the pharamacy did no have it?  Patient only completed the starter pack that received from the hospital at

## 2024-07-16 ENCOUNTER — HOSPITAL ENCOUNTER (OUTPATIENT)
Dept: WOUND CARE | Age: 44
Discharge: HOME | End: 2024-07-16
Payer: MEDICAID

## 2024-07-16 VITALS
HEART RATE: 98 BPM | TEMPERATURE: 97.5 F | SYSTOLIC BLOOD PRESSURE: 138 MMHG | RESPIRATION RATE: 20 BRPM | DIASTOLIC BLOOD PRESSURE: 74 MMHG

## 2024-07-16 DIAGNOSIS — T81.89XD NONHEALING SURGICAL WOUND, SUBSEQUENT ENCOUNTER: ICD-10-CM

## 2024-07-16 DIAGNOSIS — I87.2 VENOUS INSUFFICIENCY OF BOTH LOWER EXTREMITIES: ICD-10-CM

## 2024-07-16 DIAGNOSIS — S81.802D LEG WOUND, LEFT, SUBSEQUENT ENCOUNTER: Primary | ICD-10-CM

## 2024-07-16 PROCEDURE — 11042 DBRDMT SUBQ TIS 1ST 20SQCM/<: CPT | Performed by: NURSE PRACTITIONER

## 2024-07-16 PROCEDURE — 11042 DBRDMT SUBQ TIS 1ST 20SQCM/<: CPT

## 2024-07-16 RX ORDER — LIDOCAINE HYDROCHLORIDE 40 MG/ML
SOLUTION TOPICAL ONCE
OUTPATIENT
Start: 2024-07-16 | End: 2024-07-16

## 2024-07-16 RX ORDER — LIDOCAINE HYDROCHLORIDE 40 MG/ML
SOLUTION TOPICAL ONCE
Status: COMPLETED | OUTPATIENT
Start: 2024-07-16 | End: 2024-07-16

## 2024-07-16 RX ORDER — LIDOCAINE HYDROCHLORIDE 20 MG/ML
JELLY TOPICAL ONCE
OUTPATIENT
Start: 2024-07-16 | End: 2024-07-16

## 2024-07-16 RX ADMIN — LIDOCAINE HYDROCHLORIDE 10 ML: 40 SOLUTION TOPICAL at 13:14

## 2024-07-16 ASSESSMENT — ENCOUNTER SYMPTOMS
NAUSEA: 0
DIARRHEA: 0
RHINORRHEA: 0
SHORTNESS OF BREATH: 0
COUGH: 0
VOMITING: 0

## 2024-07-16 NOTE — PROGRESS NOTES
Wound Care Supplies      Supply Company:     [employer]    Ordering Center:     [facility] WOUND CARE  [address] [address]  [phone]  WOUND CARE Dept: [phone]   FAX NUMBER [phone]    Patient Information:      Zena [place] [address]   [phone]   : 1980  AGE: 43 y.o.     GENDER: male   EPISODE DATE: 2024    Insurance:      PRIMARY INSURANCE:  Plan: [employer]  Coverage: [email or web address] [place] MEDICAID  Effective Date: 2020  Group Number: [unfilled]  Subscriber Number: [id number] - (Medicaid Managed)    Payer/Plan Subscr  Sex Relation Sub. Ins. ID Effective Group Num   1. [employer]* ZENA EGAN 1980 Male Self [id number] 20 [id number]                                   [address] [address]       Patient Wound Information:      Problem List Items Addressed This Visit          Circulatory    Venous insufficiency of both lower extremities    Relevant Orders    Initiate Outpatient Wound Care Protocol       Other    Leg wound, left, subsequent encounter - Primary    Relevant Orders    Initiate Outpatient Wound Care Protocol    Nonhealing surgical wound, subsequent encounter    Relevant Orders    Initiate Outpatient Wound Care Protocol       WOUNDS REQUIRING DRESSING SUPPLIES:     Wound 24 Pretibial Left;Proximal #1 (Active)   Wound Image   24 0913   Wound Etiology Non-Healing Surgical 24 1308   Dressing Status Old drainage noted;New drainage noted 24 1308   Wound Cleansed Vashe 24 1308   Dressing/Treatment Other (comment) 24 1002   Wound Length (cm) 1.5 cm 24 1308   Wound Width (cm) 3.5 cm 24 1308   Wound Depth (cm) 1.2 cm 24 1308   Wound Surface Area (cm^2) 5.25 cm^2 24 1308   Change in Wound Size % (l*w) 32.69 24 1308   Wound Volume (cm^3) 6.3 cm^3 24 1308   Wound Healing % -169 24 1308   Post-Procedure Length (cm) 1.5 cm 24

## 2024-07-16 NOTE — PLAN OF CARE
Problem: Cognitive:  Goal: Knowledge of wound care  Description: Knowledge of wound care  Outcome: Progressing  Goal: Understands risk factors for wounds  Description: Understands risk factors for wounds  Outcome: Progressing     Problem: Compression therapy:  Goal: Will be free from complications associated with compression therapy  Description: Will be free from complications associated with compression therapy  Outcome: Progressing     Problem: Pain  Goal: Verbalizes/displays adequate comfort level or baseline comfort level  Outcome: Progressing

## 2024-07-16 NOTE — DISCHARGE INSTRUCTIONS
[facility] WOUND and HYPERBARIC TREATMENT  CENTER  [phone]                               Visit  Discharge Instructions / Physician Orders  DATE: 7/16/2024     Home Care: NONE     SUPPLIES ORDERED THRU:  [employer]                   DATE LAST SUPPLIED  7/16/24     Wound Location:  Left Lower leg     Cleanse with: Vashe     Dressing Orders:  Primary dressing    silvercel tuck into wound   Secondary dressing   cover with ABD pad wrap with Kerlix        Frequency:  Daily     Additional Orders: Increase protein to diet (meat, cheese, eggs, fish, peanut butter, nuts and beans)  Multivitamin daily     OFFLOADING [] YES  TYPE:                  [] NA     Weekly wound care visits until determined otherwise.     Antibiotic therapy-wound care related YES [] NO [] NA[]     MY CHART []     Smart Device  []                          Your next appointment with the Wound Care Center is in 2 weeks                                                                                                    (Please note your next appointment above and if you are unable to keep, kindly give a 24 hour notice. Thank you.)  If more than 15 min late we cannot guarantee you will be seen due to clinician schedule  Per Policy, Excessive cancellation will call for dismissal from program.  If you experience any of the following, please call the Wound Care Center during business hours:  [phone]     * Increase in Pain  * Temperature over 101  * Increase in drainage from your wound  * Drainage with a foul odor  * Bleeding  * Increase in swelling  * Need for compression bandage changes due to slippage, breakthrough drainage.     If you need medical attention outside of the business hours of the Wound Care Centers please contact your PCP or go to the nearest emergency room.     The information contained in the After Visit Summary has been reviewed with me, the patient and/or responsible adult, by my health care provider(s). I had the

## 2024-07-16 NOTE — PROGRESS NOTES
[facility] Wound Care Center   Progress Note and Procedure Note      Xander Bell  MEDICAL RECORD NUMBER:  [id number]  AGE: 43 y.o.   GENDER: male  : 1980  EPISODE DATE:  2024    Subjective:     Chief Complaint   Patient presents with    Wound Check     Left lower leg         HISTORY of PRESENT ILLNESS HPI     Xander Bell is a 43 y.o. male who presents today for wound/ulcer evaluation.   History of Wound Context: presents in follow up on on left lower leg non healing surgical wound after peroneal nerve decompression . Has been using vashe moist to moist. Wound is progressing well. Will transition to silvercel today and have him wash wound daily with vashe.   Wound/Ulcer Pain Timing/Severity: intermittent  Quality of pain: sharp, burning  Severity:  2 / 10   Modifying Factors: None  Associated Signs/Symptoms: none    Ulcer Identification:  Ulcer Type: non-healing surgical  Contributing Factors: edema, venous stasis, obesity, and smoking         PAST MEDICAL HISTORY        Diagnosis Date    Anxiety and depression     COVID-19 vaccine series not administered     Hypertension     Scalp hematoma 2020    Stomach ache     \"most of the time\"    Under care of service provider     GI Dr Aleman / last visit 24    Under care of service provider     Pain Management Dr. Katz / last visit 24    Under care of service provider     Neurosurgery Dr. Marino / last visit 24    Under care of service provider     Ortho Dr. Abelino Pacheco / last visit 2024    Wellness examination     PCP SIRI Trevino / last visit 2024       PAST SURGICAL HISTORY    Past Surgical History:   Procedure Laterality Date    HERNIA REPAIR  2003    umbilical    INGUINAL HERNIA REPAIR Right 10/15/2015    NERVE SURGERY Left 5/10/2024    LEFT PERONEAL NERVE DECOMPRESSION performed by Jamila Mraino DO at [facility] OR    PERONEAL NERVE DECOMPRESSION      LEFT PERONEAL NERVE DECOMPRESSION

## 2024-07-17 ASSESSMENT — ENCOUNTER SYMPTOMS
CONSTIPATION: 0
COUGH: 0
VOMITING: 0
BACK PAIN: 1
SHORTNESS OF BREATH: 0
NAUSEA: 0
DIARRHEA: 0
SORE THROAT: 0
EYE DISCHARGE: 0

## 2024-07-29 ENCOUNTER — PATIENT MESSAGE (OUTPATIENT)
Dept: GASTROENTEROLOGY | Age: 44
End: 2024-07-29

## 2024-07-29 NOTE — DISCHARGE INSTRUCTIONS
[facility] WOUND and HYPERBARIC TREATMENT  CENTER  [phone]                               Visit  Discharge Instructions / Physician Orders  DATE: 7/30/2024     Home Care: NONE     SUPPLIES ORDERED THRU:  [employer]                   DATE LAST SUPPLIED  7/16/24     Wound Location:  Left Lower leg     Cleanse with: Vashe     Dressing Orders:  Primary dressing    fibracol   Secondary dressing   cover with ABD pad wrap with Kerlix        Frequency:  Daily     Additional Orders: Increase protein to diet (meat, cheese, eggs, fish, peanut butter, nuts and beans)  Multivitamin daily     OFFLOADING [] YES  TYPE:                  [] NA     Weekly wound care visits until determined otherwise.     Antibiotic therapy-wound care related YES [] NO [] NA[]     MY CHART []     Smart Device  []                          Your next appointment with the Wound Care Center is in 2 weeks                                                                                                    (Please note your next appointment above and if you are unable to keep, kindly give a 24 hour notice. Thank you.)  If more than 15 min late we cannot guarantee you will be seen due to clinician schedule  Per Policy, Excessive cancellation will call for dismissal from program.  If you experience any of the following, please call the Wound Care Center during business hours:  [phone]     * Increase in Pain  * Temperature over 101  * Increase in drainage from your wound  * Drainage with a foul odor  * Bleeding  * Increase in swelling  * Need for compression bandage changes due to slippage, breakthrough drainage.     If you need medical attention outside of the business hours of the Wound Care Centers please contact your PCP or go to the nearest emergency room.     The information contained in the After Visit Summary has been reviewed with me, the patient and/or responsible adult, by my health care provider(s). I had the opportunity to ask

## 2024-07-30 ENCOUNTER — HOSPITAL ENCOUNTER (OUTPATIENT)
Dept: WOUND CARE | Age: 44
Discharge: HOME | End: 2024-07-30
Payer: MEDICAID

## 2024-07-30 VITALS
RESPIRATION RATE: 16 BRPM | DIASTOLIC BLOOD PRESSURE: 86 MMHG | HEART RATE: 67 BPM | WEIGHT: 244 LBS | BODY MASS INDEX: 34.16 KG/M2 | TEMPERATURE: 96.6 F | HEIGHT: 71 IN | SYSTOLIC BLOOD PRESSURE: 142 MMHG

## 2024-07-30 DIAGNOSIS — T81.89XD NONHEALING SURGICAL WOUND, SUBSEQUENT ENCOUNTER: ICD-10-CM

## 2024-07-30 DIAGNOSIS — I87.2 VENOUS INSUFFICIENCY OF BOTH LOWER EXTREMITIES: ICD-10-CM

## 2024-07-30 DIAGNOSIS — S81.802D LEG WOUND, LEFT, SUBSEQUENT ENCOUNTER: Primary | ICD-10-CM

## 2024-07-30 PROCEDURE — 11042 DBRDMT SUBQ TIS 1ST 20SQCM/<: CPT

## 2024-07-30 RX ORDER — LIDOCAINE HYDROCHLORIDE 40 MG/ML
SOLUTION TOPICAL ONCE
OUTPATIENT
Start: 2024-07-30 | End: 2024-07-30

## 2024-07-30 RX ORDER — LIDOCAINE HYDROCHLORIDE 40 MG/ML
SOLUTION TOPICAL ONCE
Status: COMPLETED | OUTPATIENT
Start: 2024-07-30 | End: 2024-07-30

## 2024-07-30 RX ORDER — LIDOCAINE HYDROCHLORIDE 20 MG/ML
JELLY TOPICAL ONCE
OUTPATIENT
Start: 2024-07-30 | End: 2024-07-30

## 2024-07-30 RX ADMIN — LIDOCAINE HYDROCHLORIDE 10 ML: 40 SOLUTION TOPICAL at 13:09

## 2024-07-30 ASSESSMENT — ENCOUNTER SYMPTOMS
RHINORRHEA: 0
DIARRHEA: 0
SHORTNESS OF BREATH: 0
VOMITING: 0
COUGH: 0
NAUSEA: 0

## 2024-07-30 ASSESSMENT — PAIN DESCRIPTION - ONSET: ONSET: ON-GOING

## 2024-07-30 ASSESSMENT — PAIN SCALES - GENERAL: PAINLEVEL_OUTOF10: 5

## 2024-07-30 ASSESSMENT — PAIN DESCRIPTION - DESCRIPTORS: DESCRIPTORS: BURNING

## 2024-07-30 ASSESSMENT — PAIN DESCRIPTION - LOCATION: LOCATION: LEG

## 2024-07-30 ASSESSMENT — PAIN DESCRIPTION - FREQUENCY: FREQUENCY: INTERMITTENT

## 2024-07-30 ASSESSMENT — PAIN DESCRIPTION - ORIENTATION: ORIENTATION: LEFT;LOWER

## 2024-07-30 NOTE — PROGRESS NOTES
[facility] Wound Care Center   Progress Note and Procedure Note      Xander Bell  MEDICAL RECORD NUMBER:  [id number]  AGE: 43 y.o.   GENDER: male  : 1980  EPISODE DATE:  2024    Subjective:     Chief Complaint   Patient presents with    Wound Check     Left lower leg         HISTORY of PRESENT ILLNESS HPI     Xander Bell is a 43 y.o. male who presents today for wound/ulcer evaluation.   History of Wound Context: S/P peroneal nerve decompression S/P DVT on anticoagulation    Wound/Ulcer Pain Timing/Severity: mild  Quality of pain: dull  Severity:  1 / 10   Modifying Factors: None  Associated Signs/Symptoms: none    Ulcer Identification:  Ulcer Type: non-healing surgical  Contributing Factors:  neuropathy         PAST MEDICAL HISTORY        Diagnosis Date    Anxiety and depression     COVID-19 vaccine series not administered     Hypertension     Scalp hematoma 2020    Stomach ache     \"most of the time\"    Under care of service provider     GI Dr Aleman / last visit 24    Under care of service provider     Pain Management Dr. Katz / last visit 24    Under care of service provider     Neurosurgery Dr. Marino / last visit 24    Under care of service provider     Ortho Dr. Abelino Pacheco / last visit 2024    Wellness examination     PCP SIRI Trevino / last visit 2024       PAST SURGICAL HISTORY    Past Surgical History:   Procedure Laterality Date    HERNIA REPAIR  2003    umbilical    INGUINAL HERNIA REPAIR Right 10/15/2015    NERVE SURGERY Left 5/10/2024    LEFT PERONEAL NERVE DECOMPRESSION performed by Jamila Marino DO at [facility] OR    PERONEAL NERVE DECOMPRESSION      LEFT PERONEAL NERVE DECOMPRESSION       FAMILY HISTORY    Family History   Problem Relation Age of Onset    No Known Problems Mother     Diabetes Father     High Blood Pressure Father     High Blood Pressure Paternal Grandmother     Diabetes Paternal Grandfather        SOCIAL

## 2024-07-30 NOTE — PLAN OF CARE
Problem: Pain  Goal: Verbalizes/displays adequate comfort level or baseline comfort level  Outcome: Progressing     Problem: Cognitive:  Goal: Knowledge of wound care  Description: Knowledge of wound care  Outcome: Progressing  Goal: Understands risk factors for wounds  Description: Understands risk factors for wounds  Outcome: Progressing     Problem: Compression therapy:  Goal: Will be free from complications associated with compression therapy  Description: Will be free from complications associated with compression therapy  Outcome: Progressing

## 2024-08-06 ENCOUNTER — HOSPITAL ENCOUNTER (OUTPATIENT)
Dept: VASCULAR LAB | Age: 44
Discharge: HOME | End: 2024-08-08
Attending: SURGERY
Payer: MEDICAID

## 2024-08-06 DIAGNOSIS — I82.4Z2 DVT, LOWER EXTREMITY, DISTAL, ACUTE, LEFT (HCC): ICD-10-CM

## 2024-08-06 PROCEDURE — 93970 EXTREMITY STUDY: CPT

## 2024-08-07 PROCEDURE — 93970 EXTREMITY STUDY: CPT | Performed by: STUDENT IN AN ORGANIZED HEALTH CARE EDUCATION/TRAINING PROGRAM

## 2024-08-13 ENCOUNTER — HOSPITAL ENCOUNTER (OUTPATIENT)
Dept: WOUND CARE | Age: 44
Discharge: HOME | End: 2024-08-13
Payer: MEDICAID

## 2024-08-13 VITALS
DIASTOLIC BLOOD PRESSURE: 72 MMHG | TEMPERATURE: 97.9 F | SYSTOLIC BLOOD PRESSURE: 151 MMHG | HEART RATE: 71 BPM | RESPIRATION RATE: 16 BRPM

## 2024-08-13 DIAGNOSIS — S81.802D LEG WOUND, LEFT, SUBSEQUENT ENCOUNTER: Primary | ICD-10-CM

## 2024-08-13 DIAGNOSIS — I87.2 VENOUS INSUFFICIENCY OF BOTH LOWER EXTREMITIES: ICD-10-CM

## 2024-08-13 DIAGNOSIS — T81.89XD NONHEALING SURGICAL WOUND, SUBSEQUENT ENCOUNTER: ICD-10-CM

## 2024-08-13 PROCEDURE — 11042 DBRDMT SUBQ TIS 1ST 20SQCM/<: CPT

## 2024-08-13 RX ORDER — LIDOCAINE HYDROCHLORIDE 40 MG/ML
SOLUTION TOPICAL ONCE
OUTPATIENT
Start: 2024-08-13 | End: 2024-08-13

## 2024-08-13 RX ORDER — LIDOCAINE HYDROCHLORIDE 20 MG/ML
JELLY TOPICAL ONCE
OUTPATIENT
Start: 2024-08-13 | End: 2024-08-13

## 2024-08-13 RX ORDER — LIDOCAINE HYDROCHLORIDE 40 MG/ML
SOLUTION TOPICAL ONCE
Status: COMPLETED | OUTPATIENT
Start: 2024-08-13 | End: 2024-08-13

## 2024-08-13 RX ADMIN — LIDOCAINE HYDROCHLORIDE 10 ML: 40 SOLUTION TOPICAL at 13:04

## 2024-08-13 ASSESSMENT — ENCOUNTER SYMPTOMS
VOMITING: 0
DIARRHEA: 0
COUGH: 0
RHINORRHEA: 0
SHORTNESS OF BREATH: 0
NAUSEA: 0

## 2024-08-13 NOTE — DISCHARGE INSTRUCTIONS
[facility] WOUND and HYPERBARIC TREATMENT  CENTER  [phone]                               Visit  Discharge Instructions / Physician Orders  DATE: 8/13/2024     Home Care: NONE     SUPPLIES ORDERED THRU:  [employer]                   DATE LAST SUPPLIED  7/16/24     Wound Location:  Left Lower leg     Cleanse with: Vashe     Dressing Orders:  Primary dressing    fibracol   Secondary dressing   cover with ABD pad wrap with Kerlix        Frequency:  Daily     Additional Orders: Increase protein to diet (meat, cheese, eggs, fish, peanut butter, nuts and beans)  Multivitamin daily     OFFLOADING [] YES  TYPE:                  [] NA     Weekly wound care visits until determined otherwise.     Antibiotic therapy-wound care related YES [] NO [] NA[]     MY CHART []     Smart Device  []                          Your next appointment with the Wound Care Center is in 2 weeks                                                                                                    (Please note your next appointment above and if you are unable to keep, kindly give a 24 hour notice. Thank you.)  If more than 15 min late we cannot guarantee you will be seen due to clinician schedule  Per Policy, Excessive cancellation will call for dismissal from program.  If you experience any of the following, please call the Wound Care Center during business hours:  [phone]     * Increase in Pain  * Temperature over 101  * Increase in drainage from your wound  * Drainage with a foul odor  * Bleeding  * Increase in swelling  * Need for compression bandage changes due to slippage, breakthrough drainage.     If you need medical attention outside of the business hours of the Wound Care Centers please contact your PCP or go to the nearest emergency room.     The information contained in the After Visit Summary has been reviewed with me, the patient and/or responsible adult, by my health care provider(s). I had the opportunity to ask

## 2024-08-13 NOTE — PROGRESS NOTES
HISTORY    Social History     Tobacco Use    Smoking status: Some Days     Current packs/day: 1.00     Average packs/day: 1 pack/day for 25.0 years (25.0 ttl pk-yrs)     Types: Cigarettes    Smokeless tobacco: Former     Types: Chew   Vaping Use    Vaping status: Never Used   Substance Use Topics    Alcohol use: Not Currently     Comment: couple times a week    Drug use: Yes     Types: Marijuana (Weed)     Comment: daily       ALLERGIES    Allergies   Allergen Reactions    Aspirin      Nose bleeds, thins blood       MEDICATIONS    Current Outpatient Medications on File Prior to Encounter   Medication Sig Dispense Refill    famotidine (PEPCID) 20 MG tablet Take 1 tablet by mouth nightly as needed (increased reflux symptoms) 90 tablet 1    omeprazole (PRILOSEC) 20 MG delayed release capsule Take 1 capsule by mouth every morning (before breakfast) 90 capsule 1    metoprolol succinate (TOPROL XL) 25 MG extended release tablet Take 1 tablet by mouth daily 90 tablet 1    valACYclovir (VALTREX) 500 MG tablet Take 1 tablet by mouth daily as needed (onset of lesions) 30 tablet 3    DULoxetine (CYMBALTA) 60 MG extended release capsule TAKE 1 CAPSULE BY MOUTH DAILY 90 capsule 1    nicotine (NICODERM CQ) 21 MG/24HR Place 1 patch onto the skin every 24 hours 30 patch 3    gabapentin (NEURONTIN) 600 MG tablet Take 1 tablet by mouth 3 times daily for 90 days. 90 tablet 2    cyclobenzaprine (FLEXERIL) 10 MG tablet Take 1 tablet by mouth 2 times daily as needed for Muscle spasms 60 tablet 2    apixaban (ELIQUIS) 5 MG TABS tablet Take 1 tablet by mouth 2 times daily 180 tablet 0    DULoxetine (CYMBALTA) 30 MG extended release capsule Take 1 capsule by mouth daily Prescribed by psych at [facility]- Increased total daily dose to 90mg      lidocaine (LIDODERM) 5 % Place 1 patch onto the skin every 24 hours       No current facility-administered medications on file prior to encounter.       REVIEW OF SYSTEMS    Review of Systems

## 2024-08-24 NOTE — DISCHARGE INSTRUCTIONS
[facility] WOUND and HYPERBARIC TREATMENT  CENTER  [phone]                               Visit  Discharge Instructions / Physician Orders  DATE: 8/27/2024     Home Care: NONE     SUPPLIES ORDERED THRU:  [employer]                   DATE LAST SUPPLIED  8/27/24     Wound Location:  Left Lower leg     Cleanse with: Vashe     Dressing Orders:  Primary dressing    fibracol   Secondary dressing   cover with excel SAP      Frequency:  Daily     Additional Orders: Increase protein to diet (meat, cheese, eggs, fish, peanut butter, nuts and beans)  Multivitamin daily     OFFLOADING [] YES  TYPE:                  [] NA     Weekly wound care visits until determined otherwise.     Antibiotic therapy-wound care related YES [] NO [] NA[]     MY CHART []     Smart Device  []                          Your next appointment with the Wound Care Center is in 2 weeks                                                                                                    (Please note your next appointment above and if you are unable to keep, kindly give a 24 hour notice. Thank you.)  If more than 15 min late we cannot guarantee you will be seen due to clinician schedule  Per Policy, Excessive cancellation will call for dismissal from program.  If you experience any of the following, please call the Wound Care Center during business hours:  [phone]     * Increase in Pain  * Temperature over 101  * Increase in drainage from your wound  * Drainage with a foul odor  * Bleeding  * Increase in swelling  * Need for compression bandage changes due to slippage, breakthrough drainage.     If you need medical attention outside of the business hours of the Wound Care Centers please contact your PCP or go to the nearest emergency room.     The information contained in the After Visit Summary has been reviewed with me, the patient and/or responsible adult, by my health care provider(s). I had the opportunity to ask questions regarding this

## 2024-08-27 ENCOUNTER — HOSPITAL ENCOUNTER (OUTPATIENT)
Dept: WOUND CARE | Age: 44
Discharge: HOME | End: 2024-08-27
Payer: MEDICAID

## 2024-08-27 VITALS
DIASTOLIC BLOOD PRESSURE: 80 MMHG | TEMPERATURE: 98.8 F | SYSTOLIC BLOOD PRESSURE: 121 MMHG | HEART RATE: 102 BPM | RESPIRATION RATE: 16 BRPM

## 2024-08-27 DIAGNOSIS — T81.89XD NONHEALING SURGICAL WOUND, SUBSEQUENT ENCOUNTER: ICD-10-CM

## 2024-08-27 DIAGNOSIS — S81.802D LEG WOUND, LEFT, SUBSEQUENT ENCOUNTER: Primary | ICD-10-CM

## 2024-08-27 DIAGNOSIS — I87.2 VENOUS INSUFFICIENCY OF BOTH LOWER EXTREMITIES: ICD-10-CM

## 2024-08-27 PROCEDURE — 11042 DBRDMT SUBQ TIS 1ST 20SQCM/<: CPT | Performed by: SURGERY

## 2024-08-27 PROCEDURE — 11042 DBRDMT SUBQ TIS 1ST 20SQCM/<: CPT

## 2024-08-27 RX ORDER — LIDOCAINE HYDROCHLORIDE 40 MG/ML
SOLUTION TOPICAL ONCE
Status: COMPLETED | OUTPATIENT
Start: 2024-08-27 | End: 2024-08-27

## 2024-08-27 RX ORDER — LIDOCAINE HYDROCHLORIDE 40 MG/ML
SOLUTION TOPICAL ONCE
OUTPATIENT
Start: 2024-08-27 | End: 2024-08-27

## 2024-08-27 RX ORDER — LIDOCAINE HYDROCHLORIDE 20 MG/ML
JELLY TOPICAL ONCE
OUTPATIENT
Start: 2024-08-27 | End: 2024-08-27

## 2024-08-27 RX ADMIN — LIDOCAINE HYDROCHLORIDE 10 ML: 40 SOLUTION TOPICAL at 13:16

## 2024-08-27 ASSESSMENT — ENCOUNTER SYMPTOMS
DIARRHEA: 0
COUGH: 0
NAUSEA: 0
VOMITING: 0
SHORTNESS OF BREATH: 0
RHINORRHEA: 0

## 2024-08-27 NOTE — PROGRESS NOTES
[facility] Wound Care Center   Progress Note and Procedure Note      Xander Bell  MEDICAL RECORD NUMBER:  [id number]  AGE: 43 y.o.   GENDER: male  : 1980  EPISODE DATE:  2024    Subjective:     Chief Complaint   Patient presents with    Wound Check     Left leg         HISTORY of PRESENT ILLNESS HPI     Xander Bell is a 43 y.o. male who presents today for wound/ulcer evaluation.   History of Wound Context:  non healing surgical wound at site of peroneal nerve decompression     Wound/Ulcer Pain Timing/Severity: none  Quality of pain: N/A  Severity:  0 / 10   Modifying Factors: None  Associated Signs/Symptoms: drainage    Ulcer Identification:  Ulcer Type: non-healing surgical  Contributing Factors: decreased tissue oxygenation         PAST MEDICAL HISTORY        Diagnosis Date    Anxiety and depression     COVID-19 vaccine series not administered     Hypertension     Scalp hematoma 2020    Stomach ache     \"most of the time\"    Under care of service provider     GI Dr Aleman / last visit 24    Under care of service provider     Pain Management Dr. Katz / last visit 24    Under care of service provider     Neurosurgery Dr. Marino / last visit 24    Under care of service provider     Ortho Dr. Abelino Pacheco / last visit 2024    Wellness examination     PCP SIRI Trevino / last visit 2024       PAST SURGICAL HISTORY    Past Surgical History:   Procedure Laterality Date    HERNIA REPAIR  2003    umbilical    INGUINAL HERNIA REPAIR Right 10/15/2015    NERVE SURGERY Left 5/10/2024    LEFT PERONEAL NERVE DECOMPRESSION performed by Jamila Marino DO at [facility] OR    PERONEAL NERVE DECOMPRESSION      LEFT PERONEAL NERVE DECOMPRESSION       FAMILY HISTORY    Family History   Problem Relation Age of Onset    No Known Problems Mother     Diabetes Father     High Blood Pressure Father     High Blood Pressure Paternal Grandmother     Diabetes Paternal  SYSTEMS    Review of Systems   Constitutional:  Negative for chills, fatigue and fever.   HENT:  Negative for congestion and rhinorrhea.    Respiratory:  Negative for cough and shortness of breath.         Smoker   Cardiovascular:  Positive for leg swelling (bilateral lower legs). Negative for chest pain.        Venous insufficiency   Gastrointestinal:  Negative for diarrhea, nausea and vomiting.   Musculoskeletal:  Negative for gait problem.   Skin:  Positive for wound (left leg).   Neurological:  Negative for dizziness, syncope and weakness.   Psychiatric/Behavioral:  Negative for agitation. The patient is not nervous/anxious.        Objective:      /80   Pulse (!) 102   Temp 98.8 °F (37.1 °C) (Tympanic)   Resp 16     Wt Readings from Last 3 Encounters:   07/30/24 110.7 kg (244 lb)   07/11/24 110.9 kg (244 lb 6.4 oz)   07/09/24 110.6 kg (243 lb 12.8 oz)       PHYSICAL EXAM    General Appearance: alert and oriented to person, place and time, well-developed and well-nourished, in no acute distress  Skin: warm and dry, no rash or erythema, L lateral leg wound  Head: normocephalic and atraumatic  Eyes: pupils equal, round and conjunctivae normal  Pulmonary/Chest: normal air movement, no respiratory distress  Extremities: no cyanosis and no clubbing or edema   Musculoskeletal: no joint swelling, deformity or tenderness  Neurologic: gait, coordination normal and speech normal      Assessment:     Problem List Items Addressed This Visit          Circulatory    Venous insufficiency of both lower extremities    Relevant Orders    Initiate Outpatient Wound Care Protocol       Other    Leg wound, left, subsequent encounter - Primary    Relevant Orders    Initiate Outpatient Wound Care Protocol    Nonhealing surgical wound, subsequent encounter    Relevant Orders    Initiate Outpatient Wound Care Protocol        Procedure Note  Indications:  Based on my examination of this patient's wound(s)/ulcer(s) today,

## 2024-08-27 NOTE — PLAN OF CARE
Problem: Pain  Goal: Verbalizes/displays adequate comfort level or baseline comfort level  Outcome: Progressing     Problem: Cognitive:  Goal: Knowledge of wound care  Description: Knowledge of wound care  Outcome: Progressing  Goal: Understands risk factors for wounds  Description: Understands risk factors for wounds  Outcome: Progressing     Problem: Wound:  Goal: Will show signs of wound healing; wound closure and no evidence of infection  Description: Will show signs of wound healing; wound closure and no evidence of infection  Outcome: Progressing     Problem: Falls - Risk of:  Goal: Will remain free from falls  Description: Will remain free from falls  Outcome: Progressing

## 2024-08-27 NOTE — PROGRESS NOTES
Wound Care Supplies      Supply Company:     [employer]    Ordering Center:     [facility] WOUND CARE  [address] [address]  [phone]  WOUND CARE Dept: [phone]   FAX NUMBER [phone]    Patient Information:      Zena [place] [address]   [phone]   : 1980  AGE: 43 y.o.     GENDER: male   EPISODE DATE: 2024    Insurance:      PRIMARY INSURANCE:  Plan: [employer]  Coverage: [employer] [place] MEDICAID  Effective Date: 2020  Group Number: [unfilled]  Subscriber Number: [id number] - (Medicaid Managed)    Payer/Plan Subscr  Sex Relation Sub. Ins. ID Effective Group Num   1. [employer]* ZENA EGAN 1980 Male Self [id number]                                   [address] [address]       Patient Wound Information:      Problem List Items Addressed This Visit          Circulatory    Venous insufficiency of both lower extremities    Relevant Orders    Initiate Outpatient Wound Care Protocol       Other    Leg wound, left, subsequent encounter - Primary    Relevant Orders    Initiate Outpatient Wound Care Protocol    Nonhealing surgical wound, subsequent encounter    Relevant Orders    Initiate Outpatient Wound Care Protocol       WOUNDS REQUIRING DRESSING SUPPLIES:     Wound 24 Pretibial Left;Proximal #1 (Active)   Wound Image   24 1304   Wound Etiology Non-Healing Surgical 24 1304   Dressing Status Old drainage noted;New drainage noted 24 1304   Wound Cleansed Vashe 24 1304   Dressing/Treatment Other (comment) 24 1002   Wound Length (cm) 0.6 cm 24 1304   Wound Width (cm) 1.5 cm 24 1304   Wound Depth (cm) 0.2 cm 24 1304   Wound Surface Area (cm^2) 0.9 cm^2 24 1304   Change in Wound Size % (l*w) 88.46 24 1304   Wound Volume (cm^3) 0.18 cm^3 24 1304   Wound Healing % 92 24 1304   Post-Procedure Length (cm) 0.9 cm 24

## 2024-09-09 ENCOUNTER — TELEMEDICINE (OUTPATIENT)
Dept: NEUROSURGERY | Age: 44
End: 2024-09-09

## 2024-09-09 DIAGNOSIS — G57.32 PERONEAL NEUROPATHY AT KNEE, LEFT: Primary | ICD-10-CM

## 2024-09-09 DIAGNOSIS — G57.72 COMPLEX REGIONAL PAIN SYNDROME TYPE 2 OF LEFT LOWER EXTREMITY: ICD-10-CM

## 2024-09-10 ENCOUNTER — HOSPITAL ENCOUNTER (OUTPATIENT)
Dept: WOUND CARE | Age: 44
Discharge: HOME | End: 2024-09-10
Payer: MEDICAID

## 2024-09-10 VITALS
RESPIRATION RATE: 18 BRPM | HEART RATE: 87 BPM | HEIGHT: 71 IN | SYSTOLIC BLOOD PRESSURE: 138 MMHG | DIASTOLIC BLOOD PRESSURE: 82 MMHG | BODY MASS INDEX: 34.16 KG/M2 | WEIGHT: 244 LBS | TEMPERATURE: 97.9 F

## 2024-09-10 DIAGNOSIS — T81.89XD NONHEALING SURGICAL WOUND, SUBSEQUENT ENCOUNTER: ICD-10-CM

## 2024-09-10 DIAGNOSIS — S81.802D LEG WOUND, LEFT, SUBSEQUENT ENCOUNTER: Primary | ICD-10-CM

## 2024-09-10 DIAGNOSIS — I87.2 VENOUS INSUFFICIENCY OF BOTH LOWER EXTREMITIES: ICD-10-CM

## 2024-09-10 PROCEDURE — 99212 OFFICE O/P EST SF 10 MIN: CPT | Performed by: SURGERY

## 2024-09-10 PROCEDURE — 99213 OFFICE O/P EST LOW 20 MIN: CPT

## 2024-09-10 RX ORDER — LIDOCAINE HYDROCHLORIDE 20 MG/ML
JELLY TOPICAL ONCE
OUTPATIENT
Start: 2024-09-10 | End: 2024-09-10

## 2024-09-10 RX ORDER — LIDOCAINE HYDROCHLORIDE 40 MG/ML
SOLUTION TOPICAL ONCE
Status: COMPLETED | OUTPATIENT
Start: 2024-09-10 | End: 2024-09-10

## 2024-09-10 RX ORDER — LIDOCAINE HYDROCHLORIDE 40 MG/ML
SOLUTION TOPICAL ONCE
OUTPATIENT
Start: 2024-09-10 | End: 2024-09-10

## 2024-09-10 RX ADMIN — LIDOCAINE HYDROCHLORIDE 5 ML: 40 SOLUTION TOPICAL at 13:11

## 2024-09-10 ASSESSMENT — PAIN SCALES - GENERAL: PAINLEVEL_OUTOF10: 0

## 2024-09-10 ASSESSMENT — PAIN - FUNCTIONAL ASSESSMENT: PAIN_FUNCTIONAL_ASSESSMENT: ACTIVITIES ARE NOT PREVENTED

## 2024-09-12 ENCOUNTER — OFFICE VISIT (OUTPATIENT)
Dept: FAMILY MEDICINE CLINIC | Age: 44
End: 2024-09-12
Payer: MEDICAID

## 2024-09-12 VITALS
BODY MASS INDEX: 34.17 KG/M2 | WEIGHT: 245 LBS | OXYGEN SATURATION: 98 % | TEMPERATURE: 97.4 F | HEART RATE: 74 BPM | SYSTOLIC BLOOD PRESSURE: 104 MMHG | DIASTOLIC BLOOD PRESSURE: 62 MMHG

## 2024-09-12 DIAGNOSIS — F43.23 ADJUSTMENT DISORDER WITH MIXED ANXIETY AND DEPRESSED MOOD: ICD-10-CM

## 2024-09-12 DIAGNOSIS — K21.9 GASTROESOPHAGEAL REFLUX DISEASE, UNSPECIFIED WHETHER ESOPHAGITIS PRESENT: Primary | ICD-10-CM

## 2024-09-12 DIAGNOSIS — R00.0 TACHYCARDIA: ICD-10-CM

## 2024-09-12 DIAGNOSIS — G90.522 COMPLEX REGIONAL PAIN SYNDROME I OF LEFT LOWER LIMB: ICD-10-CM

## 2024-09-12 PROCEDURE — G8427 DOCREV CUR MEDS BY ELIG CLIN: HCPCS

## 2024-09-12 PROCEDURE — 4004F PT TOBACCO SCREEN RCVD TLK: CPT

## 2024-09-12 PROCEDURE — G8417 CALC BMI ABV UP PARAM F/U: HCPCS

## 2024-09-12 PROCEDURE — 99213 OFFICE O/P EST LOW 20 MIN: CPT

## 2024-09-16 ENCOUNTER — OFFICE VISIT (OUTPATIENT)
Dept: PAIN MANAGEMENT | Age: 44
End: 2024-09-16
Payer: MEDICAID

## 2024-09-16 VITALS — HEIGHT: 71 IN | WEIGHT: 246 LBS | BODY MASS INDEX: 34.44 KG/M2

## 2024-09-16 DIAGNOSIS — G57.72 COMPLEX REGIONAL PAIN SYNDROME TYPE 2 OF LEFT LOWER EXTREMITY: Primary | ICD-10-CM

## 2024-09-16 DIAGNOSIS — G89.29 OTHER CHRONIC PAIN: ICD-10-CM

## 2024-09-16 PROCEDURE — G8427 DOCREV CUR MEDS BY ELIG CLIN: HCPCS | Performed by: NURSE PRACTITIONER

## 2024-09-16 PROCEDURE — 4004F PT TOBACCO SCREEN RCVD TLK: CPT | Performed by: NURSE PRACTITIONER

## 2024-09-16 PROCEDURE — G8417 CALC BMI ABV UP PARAM F/U: HCPCS | Performed by: NURSE PRACTITIONER

## 2024-09-16 PROCEDURE — 99213 OFFICE O/P EST LOW 20 MIN: CPT | Performed by: NURSE PRACTITIONER

## 2024-09-16 RX ORDER — CYCLOBENZAPRINE HCL 10 MG
10 TABLET ORAL 2 TIMES DAILY PRN
Qty: 60 TABLET | Refills: 2 | Status: SHIPPED | OUTPATIENT
Start: 2024-09-16 | End: 2024-12-15

## 2024-09-16 RX ORDER — GABAPENTIN 600 MG/1
600 TABLET ORAL 3 TIMES DAILY
Qty: 90 TABLET | Refills: 2 | Status: SHIPPED | OUTPATIENT
Start: 2024-09-16 | End: 2024-12-15

## 2024-09-16 ASSESSMENT — ENCOUNTER SYMPTOMS
COUGH: 0
CONSTIPATION: 0
SHORTNESS OF BREATH: 0
BACK PAIN: 1

## 2024-09-17 NOTE — DISCHARGE INSTRUCTIONS
Normal changes you may experience after a EGD:  Activity   You have had anesthesia today  Do not drive, operate heavy equipment, consume alcoholic beverages, or make any important decisions  for 24 hours   Take your time changing positions today. You may feel light headed or dizzy if you move too quickly.   Rest for the next 24 hours.   Diet   You can eat your normal diet when you feel well. You should start off with bland foods like chicken soup, toast, or yogurt. Then advance as tolerated.  Drink plenty of fluids (unless your doctor tells you not to). Your urine should be very lightly colored without a strong odor.    Medicines   Continue your home medications as ordered by your physician.     Call your doctor now or seek immediate medical care if: [phone]  You are passing blood rectally or vomiting blood (color of blood may be red or black)  You have coffee ground looking vomit  Severe abdominal pain or tenderness    You have a fever, chills or excessive sweating   You have persistent nausea or vomiting   Redness or swelling at the IV site

## 2024-09-21 SDOH — HEALTH STABILITY: PHYSICAL HEALTH: ON AVERAGE, HOW MANY DAYS PER WEEK DO YOU ENGAGE IN MODERATE TO STRENUOUS EXERCISE (LIKE A BRISK WALK)?: 3 DAYS

## 2024-09-24 ENCOUNTER — OFFICE VISIT (OUTPATIENT)
Dept: FAMILY MEDICINE CLINIC | Age: 44
End: 2024-09-24
Payer: MEDICAID

## 2024-09-24 VITALS
WEIGHT: 243.4 LBS | DIASTOLIC BLOOD PRESSURE: 72 MMHG | TEMPERATURE: 97.4 F | SYSTOLIC BLOOD PRESSURE: 124 MMHG | BODY MASS INDEX: 33.95 KG/M2 | OXYGEN SATURATION: 99 % | HEART RATE: 98 BPM

## 2024-09-24 DIAGNOSIS — Z86.718 HX OF DEEP VENOUS THROMBOSIS: ICD-10-CM

## 2024-09-24 DIAGNOSIS — G57.72 COMPLEX REGIONAL PAIN SYNDROME TYPE 2 OF LEFT LOWER EXTREMITY: ICD-10-CM

## 2024-09-24 DIAGNOSIS — D64.9 ANEMIA, UNSPECIFIED TYPE: ICD-10-CM

## 2024-09-24 DIAGNOSIS — E83.51 HYPOCALCEMIA: ICD-10-CM

## 2024-09-24 DIAGNOSIS — K21.00 GASTROESOPHAGEAL REFLUX DISEASE WITH ESOPHAGITIS WITHOUT HEMORRHAGE: ICD-10-CM

## 2024-09-24 DIAGNOSIS — S92.902D CLOSED FRACTURE OF LEFT FOOT WITH ROUTINE HEALING, SUBSEQUENT ENCOUNTER: Primary | ICD-10-CM

## 2024-09-24 PROBLEM — Z71.3 DIETARY COUNSELING: Status: ACTIVE | Noted: 2024-09-24

## 2024-09-24 PROBLEM — G90.522 COMPLEX REGIONAL PAIN SYNDROME I OF LEFT LOWER LIMB: Status: ACTIVE | Noted: 2022-02-02

## 2024-09-24 PROBLEM — S92.902A CLOSED FRACTURE OF BONE OF LEFT FOOT: Status: ACTIVE | Noted: 2024-09-24

## 2024-09-24 PROCEDURE — 99203 OFFICE O/P NEW LOW 30 MIN: CPT

## 2024-09-24 PROCEDURE — G8417 CALC BMI ABV UP PARAM F/U: HCPCS

## 2024-09-24 PROCEDURE — 4004F PT TOBACCO SCREEN RCVD TLK: CPT

## 2024-09-24 PROCEDURE — G8427 DOCREV CUR MEDS BY ELIG CLIN: HCPCS

## 2024-09-24 RX ORDER — ACETAMINOPHEN 500 MG
1000 TABLET ORAL EVERY 6 HOURS PRN
COMMUNITY
Start: 2024-09-21 | End: 2024-10-21

## 2024-09-24 ASSESSMENT — ENCOUNTER SYMPTOMS
DIARRHEA: 0
EYE DISCHARGE: 0
NAUSEA: 0
COUGH: 0
VOMITING: 0
ABDOMINAL PAIN: 0
VOMITING: 0
COUGH: 0
SORE THROAT: 0
WHEEZING: 0
BACK PAIN: 1
COLOR CHANGE: 0
CONSTIPATION: 0
SHORTNESS OF BREATH: 0
DIARRHEA: 0
CONSTIPATION: 0
SHORTNESS OF BREATH: 0
NAUSEA: 0

## 2024-09-24 NOTE — PROGRESS NOTES
PAT phone call for EGD completed with pt.    Date/time/location (entrance C) of surgery/procedure verified with pt.    NPO after MN status verified with pt.    Need for  ( x  )  verified with pt.  Please contact prescribing physician  whether or not to HOLD eliquis or not for this procedure.     Instructed pt to take a shower the AM of surgery/procedure and to avoid lotions, creams, jewelry.    Instructed pt to take BP meds with a small sip of water prior to procedure/surgery. DO NOT smoke the morning of procedure.

## 2024-09-25 ENCOUNTER — ANESTHESIA EVENT (OUTPATIENT)
Dept: OPERATING ROOM | Age: 44
End: 2024-09-25
Payer: MEDICAID

## 2024-09-26 ENCOUNTER — HOSPITAL ENCOUNTER (OUTPATIENT)
Age: 44
Setting detail: OUTPATIENT SURGERY
Discharge: HOME | End: 2024-09-26
Attending: INTERNAL MEDICINE | Admitting: INTERNAL MEDICINE
Payer: MEDICAID

## 2024-09-26 ENCOUNTER — ANESTHESIA (OUTPATIENT)
Dept: OPERATING ROOM | Age: 44
End: 2024-09-26
Payer: MEDICAID

## 2024-09-26 VITALS
HEIGHT: 71 IN | SYSTOLIC BLOOD PRESSURE: 137 MMHG | RESPIRATION RATE: 15 BRPM | HEART RATE: 61 BPM | WEIGHT: 236.6 LBS | TEMPERATURE: 97.9 F | DIASTOLIC BLOOD PRESSURE: 87 MMHG | BODY MASS INDEX: 33.12 KG/M2 | OXYGEN SATURATION: 98 %

## 2024-09-26 DIAGNOSIS — R10.13 ABDOMINAL PAIN, EPIGASTRIC: ICD-10-CM

## 2024-09-26 PROCEDURE — 7100000010 HC PHASE II RECOVERY - FIRST 15 MIN: Performed by: INTERNAL MEDICINE

## 2024-09-26 PROCEDURE — 88305 TISSUE EXAM BY PATHOLOGIST: CPT

## 2024-09-26 PROCEDURE — 43239 EGD BIOPSY SINGLE/MULTIPLE: CPT | Performed by: INTERNAL MEDICINE

## 2024-09-26 PROCEDURE — 2500000003 HC RX 250 WO HCPCS: Performed by: NURSE ANESTHETIST, CERTIFIED REGISTERED

## 2024-09-26 PROCEDURE — 7100000011 HC PHASE II RECOVERY - ADDTL 15 MIN: Performed by: INTERNAL MEDICINE

## 2024-09-26 PROCEDURE — 2580000003 HC RX 258: Performed by: NURSE ANESTHETIST, CERTIFIED REGISTERED

## 2024-09-26 PROCEDURE — 6360000002 HC RX W HCPCS: Performed by: NURSE ANESTHETIST, CERTIFIED REGISTERED

## 2024-09-26 PROCEDURE — 2709999900 HC NON-CHARGEABLE SUPPLY: Performed by: INTERNAL MEDICINE

## 2024-09-26 PROCEDURE — 3609012400 HC EGD TRANSORAL BIOPSY SINGLE/MULTIPLE: Performed by: INTERNAL MEDICINE

## 2024-09-26 PROCEDURE — 3700000000 HC ANESTHESIA ATTENDED CARE: Performed by: INTERNAL MEDICINE

## 2024-09-26 RX ORDER — SODIUM CHLORIDE 0.9 % (FLUSH) 0.9 %
5-40 SYRINGE (ML) INJECTION EVERY 12 HOURS SCHEDULED
Status: DISCONTINUED | OUTPATIENT
Start: 2024-09-26 | End: 2024-09-26 | Stop reason: HOSPADM

## 2024-09-26 RX ORDER — FENTANYL CITRATE 50 UG/ML
INJECTION, SOLUTION INTRAMUSCULAR; INTRAVENOUS
Status: DISCONTINUED | OUTPATIENT
Start: 2024-09-26 | End: 2024-09-26 | Stop reason: SDUPTHER

## 2024-09-26 RX ORDER — LIDOCAINE HYDROCHLORIDE 10 MG/ML
INJECTION, SOLUTION INFILTRATION; PERINEURAL
Status: DISCONTINUED | OUTPATIENT
Start: 2024-09-26 | End: 2024-09-26 | Stop reason: SDUPTHER

## 2024-09-26 RX ORDER — SODIUM CHLORIDE 0.9 % (FLUSH) 0.9 %
5-40 SYRINGE (ML) INJECTION PRN
Status: DISCONTINUED | OUTPATIENT
Start: 2024-09-26 | End: 2024-09-26 | Stop reason: HOSPADM

## 2024-09-26 RX ORDER — NALOXONE HYDROCHLORIDE 0.4 MG/ML
INJECTION, SOLUTION INTRAMUSCULAR; INTRAVENOUS; SUBCUTANEOUS PRN
Status: DISCONTINUED | OUTPATIENT
Start: 2024-09-26 | End: 2024-09-26 | Stop reason: HOSPADM

## 2024-09-26 RX ORDER — SODIUM CHLORIDE 9 MG/ML
INJECTION, SOLUTION INTRAVENOUS PRN
Status: DISCONTINUED | OUTPATIENT
Start: 2024-09-26 | End: 2024-09-26 | Stop reason: HOSPADM

## 2024-09-26 RX ORDER — DIPHENHYDRAMINE HYDROCHLORIDE 50 MG/ML
12.5 INJECTION INTRAMUSCULAR; INTRAVENOUS
Status: DISCONTINUED | OUTPATIENT
Start: 2024-09-26 | End: 2024-09-26 | Stop reason: HOSPADM

## 2024-09-26 RX ORDER — GLYCOPYRROLATE 0.2 MG/ML
0.4 INJECTION INTRAMUSCULAR; INTRAVENOUS ONCE
Status: DISCONTINUED | OUTPATIENT
Start: 2024-09-26 | End: 2024-09-26 | Stop reason: HOSPADM

## 2024-09-26 RX ORDER — IPRATROPIUM BROMIDE AND ALBUTEROL SULFATE 2.5; .5 MG/3ML; MG/3ML
1 SOLUTION RESPIRATORY (INHALATION)
Status: DISCONTINUED | OUTPATIENT
Start: 2024-09-26 | End: 2024-09-26 | Stop reason: HOSPADM

## 2024-09-26 RX ORDER — MEPERIDINE HYDROCHLORIDE 50 MG/ML
12.5 INJECTION INTRAMUSCULAR; INTRAVENOUS; SUBCUTANEOUS EVERY 5 MIN PRN
Status: DISCONTINUED | OUTPATIENT
Start: 2024-09-26 | End: 2024-09-26 | Stop reason: HOSPADM

## 2024-09-26 RX ORDER — PROPOFOL 10 MG/ML
INJECTION, EMULSION INTRAVENOUS
Status: DISCONTINUED | OUTPATIENT
Start: 2024-09-26 | End: 2024-09-26 | Stop reason: SDUPTHER

## 2024-09-26 RX ORDER — SODIUM CHLORIDE, SODIUM LACTATE, POTASSIUM CHLORIDE, CALCIUM CHLORIDE 600; 310; 30; 20 MG/100ML; MG/100ML; MG/100ML; MG/100ML
INJECTION, SOLUTION INTRAVENOUS CONTINUOUS
Status: DISCONTINUED | OUTPATIENT
Start: 2024-09-26 | End: 2024-09-26 | Stop reason: HOSPADM

## 2024-09-26 RX ORDER — MIDAZOLAM HYDROCHLORIDE 2 MG/2ML
2 INJECTION, SOLUTION INTRAMUSCULAR; INTRAVENOUS
Status: DISCONTINUED | OUTPATIENT
Start: 2024-09-26 | End: 2024-09-26 | Stop reason: HOSPADM

## 2024-09-26 RX ORDER — MORPHINE SULFATE 2 MG/ML
2 INJECTION, SOLUTION INTRAMUSCULAR; INTRAVENOUS EVERY 5 MIN PRN
Status: DISCONTINUED | OUTPATIENT
Start: 2024-09-26 | End: 2024-09-26 | Stop reason: HOSPADM

## 2024-09-26 RX ORDER — OXYCODONE AND ACETAMINOPHEN 5; 325 MG/1; MG/1
1 TABLET ORAL
Status: DISCONTINUED | OUTPATIENT
Start: 2024-09-26 | End: 2024-09-26 | Stop reason: HOSPADM

## 2024-09-26 RX ORDER — GLYCOPYRROLATE 1 MG/5 ML
SYRINGE (ML) INTRAVENOUS
Status: DISCONTINUED | OUTPATIENT
Start: 2024-09-26 | End: 2024-09-26 | Stop reason: SDUPTHER

## 2024-09-26 RX ORDER — METOCLOPRAMIDE HYDROCHLORIDE 5 MG/ML
10 INJECTION INTRAMUSCULAR; INTRAVENOUS
Status: DISCONTINUED | OUTPATIENT
Start: 2024-09-26 | End: 2024-09-26 | Stop reason: HOSPADM

## 2024-09-26 RX ORDER — LABETALOL HYDROCHLORIDE 5 MG/ML
10 INJECTION, SOLUTION INTRAVENOUS
Status: DISCONTINUED | OUTPATIENT
Start: 2024-09-26 | End: 2024-09-26 | Stop reason: HOSPADM

## 2024-09-26 RX ORDER — SODIUM CHLORIDE, SODIUM LACTATE, POTASSIUM CHLORIDE, CALCIUM CHLORIDE 600; 310; 30; 20 MG/100ML; MG/100ML; MG/100ML; MG/100ML
INJECTION, SOLUTION INTRAVENOUS
Status: DISCONTINUED | OUTPATIENT
Start: 2024-09-26 | End: 2024-09-26 | Stop reason: SDUPTHER

## 2024-09-26 RX ORDER — HYDRALAZINE HYDROCHLORIDE 20 MG/ML
10 INJECTION INTRAMUSCULAR; INTRAVENOUS
Status: DISCONTINUED | OUTPATIENT
Start: 2024-09-26 | End: 2024-09-26 | Stop reason: HOSPADM

## 2024-09-26 RX ORDER — OXYCODONE AND ACETAMINOPHEN 5; 325 MG/1; MG/1
2 TABLET ORAL
Status: DISCONTINUED | OUTPATIENT
Start: 2024-09-26 | End: 2024-09-26 | Stop reason: HOSPADM

## 2024-09-26 RX ORDER — LIDOCAINE HYDROCHLORIDE 10 MG/ML
1 INJECTION, SOLUTION EPIDURAL; INFILTRATION; INTRACAUDAL; PERINEURAL
Status: DISCONTINUED | OUTPATIENT
Start: 2024-09-26 | End: 2024-09-26 | Stop reason: HOSPADM

## 2024-09-26 RX ORDER — ONDANSETRON 2 MG/ML
4 INJECTION INTRAMUSCULAR; INTRAVENOUS
Status: DISCONTINUED | OUTPATIENT
Start: 2024-09-26 | End: 2024-09-26 | Stop reason: HOSPADM

## 2024-09-26 RX ADMIN — PROPOFOL 120 MG: 10 INJECTION, EMULSION INTRAVENOUS at 10:01

## 2024-09-26 RX ADMIN — Medication 0.2 MG: at 09:58

## 2024-09-26 RX ADMIN — SODIUM CHLORIDE, POTASSIUM CHLORIDE, SODIUM LACTATE AND CALCIUM CHLORIDE: 600; 310; 30; 20 INJECTION, SOLUTION INTRAVENOUS at 09:59

## 2024-09-26 RX ADMIN — PROPOFOL 30 MG: 10 INJECTION, EMULSION INTRAVENOUS at 10:06

## 2024-09-26 RX ADMIN — LIDOCAINE HYDROCHLORIDE 100 MG: 10 INJECTION, SOLUTION INFILTRATION; PERINEURAL at 10:01

## 2024-09-26 RX ADMIN — FENTANYL CITRATE 100 MCG: 50 INJECTION, SOLUTION INTRAMUSCULAR; INTRAVENOUS at 09:58

## 2024-09-26 ASSESSMENT — LIFESTYLE VARIABLES: SMOKING_STATUS: 1

## 2024-09-26 ASSESSMENT — PAIN - FUNCTIONAL ASSESSMENT: PAIN_FUNCTIONAL_ASSESSMENT: NONE - DENIES PAIN

## 2024-09-26 NOTE — OP NOTE
PROCEDURE NOTE    DATE OF PROCEDURE: 9/26/2024     SURGEON: Douglas Aleman MD  Facility: [facility]   ASSISTANT: None  Anesthesia: Monitored anesthesia care  PREOPERATIVE DIAGNOSIS: Upper abdominal pain    Diagnosis:    POSTOPERATIVE DIAGNOSIS: As described below    OPERATION: Upper GI endoscopy with Biopsy    ANESTHESIA: Moderate Sedation     ESTIMATED BLOOD LOSS: Less than 50 ml    COMPLICATIONS: None.     SPECIMENS:  Was Obtained: GE junction, gastric and duodenal biopsy    HISTORY: The patient is a 43 y.o. year old male with history of above preop diagnosis.  I recommended esophagogastroduodenoscopy with possible biopsy and I explained the risk, benefits, expected outcome, and alternatives to the procedure.  Risks included but are not limited to bleeding, infection, respiratory distress, hypotension, and perforation of the esophagus, stomach, or duodenum.  Patient understands and is in agreement.      PROCEDURE: The patient was given IV conscious sedation.  The patient's SPO2 remained above 90% throughout the procedure.The gastroscope was inserted orally and advanced under direct vision through the esophagus, through the stomach, through the pylorus, and into the descending duodenum.      Post sedation note :The patient's SPO2 remained above 90% throughout the procedure.the vital signs remained stable , and no immediate complication form the procedure noted, patient will be ready for d/c when criteria is met .      Findings:    Retropharyngeal area was grossly normal appearing    Esophagus: abnormal: small tongue of salmon colored mucosa near GE junction, R/o- Bull's, biopsy obtained    Esophagogastric markings: Diaphragmatic hiatus- 43 cm; GE junction- 43 cm; Squamo-columnar junction- 43 cm    Stomach:    Fundus: normal    Body: normal    Antrum: normal  Biopsy obtained to r/o- H pylori     Duodenum:     Descending: normal    Bulb: normal  Biopsy obtained to r/o- celiac disease     The scope was removed

## 2024-09-26 NOTE — H&P
Procedure History and Physical    Pre-Procedural Diagnosis:  Upper abdominal pain    Indications:  same    Procedure Planned: gastroscopy     History Obtained From:  patient    HISTORY OF PRESENT ILLNESS:       The patient is a 43 y.o. male who presents for the above procedure.        Past Medical History:    Past Medical History:   Diagnosis Date    Anxiety and depression     COVID-19 vaccine series not administered     Hx of blood clots 2024    dvt-LLE    Hypertension     Scalp hematoma 07/29/2020    Stomach ache     \"most of the time\"    Under care of service provider     GI Dr Aleman / last visit 4/18/24    Under care of service provider     Pain Management Dr. Katz / last visit 4/9/24    Under care of service provider     Neurosurgery Dr. Marino / last visit 4/4/24    Under care of service provider     Ortho Dr. Abelino Pacheco / last visit Jan 2024    Wellness examination     PCP SIRI Trevino / last visit Feb 2024       Past Surgical History:    Past Surgical History:   Procedure Laterality Date    HERNIA REPAIR  12/03/2003    umbilical    INGUINAL HERNIA REPAIR Right 10/15/2015    NERVE SURGERY Left 5/10/2024    LEFT PERONEAL NERVE DECOMPRESSION performed by Jamila Marino DO at [facility] OR    PERONEAL NERVE DECOMPRESSION      LEFT PERONEAL NERVE DECOMPRESSION       Medications:  Current Facility-Administered Medications   Medication Dose Route Frequency Provider Last Rate Last Admin    lidocaine PF 1 % injection 1 mL  1 mL IntraDERmal Once PRN Peg Rosenthal MD        lactated ringers IV soln infusion   IntraVENous Continuous Peg Rosenthal MD        sodium chloride flush 0.9 % injection 5-40 mL  5-40 mL IntraVENous 2 times per day Peg Rosenthal MD        sodium chloride flush 0.9 % injection 5-40 mL  5-40 mL IntraVENous PRN Peg Rosenthal MD        0.9 % sodium chloride infusion   IntraVENous PRN Peg Rosenthal MD           Allergies:   Allergies   Allergen Reactions    Aspirin       Nose bleeds, thins blood                 Social   Social History     Tobacco Use    Smoking status: Some Days     Current packs/day: 1.00     Average packs/day: 1 pack/day for 25.0 years (25.0 ttl pk-yrs)     Types: Cigarettes    Smokeless tobacco: Former     Types: Chew   Substance Use Topics    Alcohol use: Not Currently     Comment: couple times a week        Family History   Problem Relation Age of Onset    No Known Problems Mother     Diabetes Father     High Blood Pressure Father     High Blood Pressure Paternal Grandmother     Diabetes Paternal Grandfather       No family history of colon cancer, Crohn's disease, or ulcerative colitis    Problems with Sedation/Anesthesia in the past? no    REVIEW OF SYSTEMS:  12 point review of systems negative other than mentioned above.      PHYSICAL EXAM:    Vitals:  /80   Pulse 94   Temp 97.8 °F (36.6 °C) (Infrared)   Resp 15   Ht 1.803 m (5' 11\")   Wt 107.3 kg (236 lb 9.6 oz)   SpO2 96%   BMI 33.00 kg/m²     Focused Exam related to procedure:    General appearance: NAD, conversant   Eyes: anicteric sclerae, moist conjunctivae; no lid-lag; PERRLA   Lungs: CTA, with normal respiratory effort and no intercostal retractions   CV: RRR, no MRGs   Abdomen: Soft, non-tender; no masses or HSM   Skin: Normal temperature, turgor and texture; no rash, ulcers or subcutaneous nodules     DATA:  CBC:   Lab Results   Component Value Date    WBC 9.8 05/19/2024    HGB 11.8 (L) 05/19/2024    HCT 35.9 (L) 05/19/2024    MCV 98.9 05/19/2024     05/19/2024     BUN/Cr:   Lab Results   Component Value Date    BUN 14 05/19/2024   ,   Lab Results   Component Value Date    CREATININE 0.9 05/19/2024     Potassium:   Lab Results   Component Value Date    K 4.1 05/19/2024     PT/INR:   Lab Results   Component Value Date    INR 1.1 05/17/2024    INR 1.1 07/25/2020    PROTIME 14.0 05/17/2024    PROTIME 11.5 07/25/2020       ASSESSMENT AND PLAN:       1.  Patient is a 43 y.o. male

## 2024-09-27 ENCOUNTER — TELEPHONE (OUTPATIENT)
Dept: FAMILY MEDICINE CLINIC | Age: 44
End: 2024-09-27

## 2024-09-30 LAB — SURGICAL PATHOLOGY REPORT: NORMAL

## 2024-09-30 NOTE — DISCHARGE INSTRUCTIONS
[facility]  [phone]                               Visit  Discharge Instructions / Physician Orders  DATE: 10/1/2024     Home Care: NONE     SUPPLIES ORDERED THRU:  [employer]                   DATE LAST SUPPLIED  8/27/24     Wound Location:  Left Lower leg-healed     Cleanse with: Vashe     Dressing Orders:       Frequency:       Additional Orders: Increase protein to diet (meat, cheese, eggs, fish, peanut butter, nuts and beans)  Multivitamin daily     OFFLOADING [x] YES  TYPE:                  [] NA   wear cam boot  Weekly wound care visits until determined otherwise.     Antibiotic therapy-wound care related YES [] NO [] NA[]     MY CHART []     Smart Device  []                          Your next appointment with the Wound Care Center Call as needed                                                                                                   (Please note your next appointment above and if you are unable to keep, kindly give a 24 hour notice. Thank you.)  If more than 15 min late we cannot guarantee you will be seen due to clinician schedule  Per Policy, Excessive cancellation will call for dismissal from program.  If you experience any of the following, please call the Wound Care Center during business hours:  [phone]     * Increase in Pain  * Temperature over 101  * Increase in drainage from your wound  * Drainage with a foul odor  * Bleeding  * Increase in swelling  * Need for compression bandage changes due to slippage, breakthrough drainage.     If you need medical attention outside of the business hours of the Wound Care Centers please contact your PCP or go to the nearest emergency room.     The information contained in the After Visit Summary has been reviewed with me, the patient and/or responsible adult, by my health care provider(s). I had the opportunity to ask questions regarding this information. I have elected to receive;      []After

## 2024-10-01 ENCOUNTER — HOSPITAL ENCOUNTER (OUTPATIENT)
Dept: WOUND CARE | Age: 44
Discharge: HOME | End: 2024-10-01
Payer: MEDICAID

## 2024-10-01 VITALS
HEART RATE: 112 BPM | HEIGHT: 71 IN | DIASTOLIC BLOOD PRESSURE: 77 MMHG | TEMPERATURE: 99.1 F | WEIGHT: 236 LBS | RESPIRATION RATE: 16 BRPM | SYSTOLIC BLOOD PRESSURE: 153 MMHG | BODY MASS INDEX: 33.04 KG/M2

## 2024-10-01 DIAGNOSIS — I87.2 VENOUS INSUFFICIENCY OF BOTH LOWER EXTREMITIES: ICD-10-CM

## 2024-10-01 DIAGNOSIS — T81.89XD NONHEALING SURGICAL WOUND, SUBSEQUENT ENCOUNTER: ICD-10-CM

## 2024-10-01 DIAGNOSIS — S81.802D LEG WOUND, LEFT, SUBSEQUENT ENCOUNTER: Primary | ICD-10-CM

## 2024-10-01 PROCEDURE — 99212 OFFICE O/P EST SF 10 MIN: CPT

## 2024-10-01 PROCEDURE — 99212 OFFICE O/P EST SF 10 MIN: CPT | Performed by: SURGERY

## 2024-10-01 RX ORDER — LIDOCAINE HYDROCHLORIDE 20 MG/ML
JELLY TOPICAL ONCE
OUTPATIENT
Start: 2024-10-01 | End: 2024-10-01

## 2024-10-01 RX ORDER — LIDOCAINE HYDROCHLORIDE 40 MG/ML
SOLUTION TOPICAL ONCE
Status: DISCONTINUED | OUTPATIENT
Start: 2024-10-01 | End: 2024-10-02 | Stop reason: HOSPADM

## 2024-10-01 RX ORDER — LIDOCAINE HYDROCHLORIDE 40 MG/ML
SOLUTION TOPICAL ONCE
OUTPATIENT
Start: 2024-10-01 | End: 2024-10-01

## 2024-10-01 ASSESSMENT — PAIN SCALES - GENERAL: PAINLEVEL_OUTOF10: 0

## 2024-10-01 ASSESSMENT — ENCOUNTER SYMPTOMS
COLOR CHANGE: 0
WHEEZING: 0
COUGH: 0
ABDOMINAL PAIN: 0
DIARRHEA: 0
NAUSEA: 0
CONSTIPATION: 0
SHORTNESS OF BREATH: 0
VOMITING: 0

## 2024-10-01 NOTE — PROGRESS NOTES
[facility]   Progress Note and Procedure Note      Xander Bell  MEDICAL RECORD NUMBER:  [id number]  AGE: 43 y.o.   GENDER: male  : 1980  EPISODE DATE:  10/1/2024    Subjective:     Chief Complaint   Patient presents with    Wound Check     Left leg         HISTORY of PRESENT ILLNESS HPI     Xander Bell is a 43 y.o. male who presents today for wound/ulcer evaluation.   History of Wound Context: presents for surgical site healing. Meanwhile cut base of let toe and is seeing podiatrust    Wound/Ulcer Pain Timing/Severity: none  Quality of pain: N/A  Severity:  0 / 10   Modifying Factors: None  Associated Signs/Symptoms: none    Ulcer Identification:  Ulcer Type: non-healing surgical  Contributing Factors:  decreased sensation left leg         PAST MEDICAL HISTORY        Diagnosis Date    Anxiety and depression     COVID-19 vaccine series not administered     Hx of blood clots     dvt-LLE    Hypertension     Scalp hematoma 2020    Stomach ache     \"most of the time\"    Under care of service provider     GI Dr Aleman / last visit 24    Under care of service provider     Pain Management Dr. Katz / last visit 24    Under care of service provider     Neurosurgery Dr. Marino / last visit 24    Under care of service provider     Ortho Dr. Abelino Pacheco / last visit 2024    Wellness examination     PCP SIRI Trevino / last visit 2024       PAST SURGICAL HISTORY    Past Surgical History:   Procedure Laterality Date    HERNIA REPAIR  2003    umbilical    INGUINAL HERNIA REPAIR Right 10/15/2015    NERVE SURGERY Left 5/10/2024    LEFT PERONEAL NERVE DECOMPRESSION performed by Jamila Marino DO at [facility] OR    PERONEAL NERVE DECOMPRESSION      LEFT PERONEAL NERVE DECOMPRESSION    UPPER GASTROINTESTINAL ENDOSCOPY N/A 2024    ESOPHAGOGASTRODUODENOSCOPY BIOPSY performed by Douglas Aleman MD at [facility] OR       [facility]

## 2024-10-03 ENCOUNTER — HOSPITAL ENCOUNTER (OUTPATIENT)
Dept: PAIN MANAGEMENT | Age: 44
Discharge: HOME | End: 2024-10-03
Payer: MEDICAID

## 2024-10-03 VITALS — HEIGHT: 71 IN | WEIGHT: 236 LBS | BODY MASS INDEX: 33.04 KG/M2

## 2024-10-03 DIAGNOSIS — G57.32 PERONEAL NEUROPATHY AT KNEE, LEFT: ICD-10-CM

## 2024-10-03 DIAGNOSIS — G57.72 COMPLEX REGIONAL PAIN SYNDROME TYPE 2 OF LEFT LOWER EXTREMITY: ICD-10-CM

## 2024-10-03 DIAGNOSIS — G90.522 COMPLEX REGIONAL PAIN SYNDROME I OF LEFT LOWER LIMB: Primary | ICD-10-CM

## 2024-10-03 PROCEDURE — 99213 OFFICE O/P EST LOW 20 MIN: CPT

## 2024-10-03 PROCEDURE — 99214 OFFICE O/P EST MOD 30 MIN: CPT | Performed by: ANESTHESIOLOGY

## 2024-10-03 ASSESSMENT — PAIN SCALES - GENERAL: PAINLEVEL_OUTOF10: 10

## 2024-10-03 ASSESSMENT — ENCOUNTER SYMPTOMS
EYES NEGATIVE: 1
RESPIRATORY NEGATIVE: 1
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
## 2024-10-07 ENCOUNTER — OFFICE VISIT (OUTPATIENT)
Dept: PODIATRY | Age: 44
End: 2024-10-07
Payer: MEDICAID

## 2024-10-07 VITALS — BODY MASS INDEX: 34.16 KG/M2 | HEIGHT: 71 IN | WEIGHT: 244 LBS

## 2024-10-07 DIAGNOSIS — S92.335A CLOSED NONDISPLACED FRACTURE OF THIRD METATARSAL BONE OF LEFT FOOT, INITIAL ENCOUNTER: ICD-10-CM

## 2024-10-07 DIAGNOSIS — M79.672 PAIN IN LEFT FOOT: ICD-10-CM

## 2024-10-07 DIAGNOSIS — S92.325A CLOSED NONDISPLACED FRACTURE OF SECOND METATARSAL BONE OF LEFT FOOT, INITIAL ENCOUNTER: ICD-10-CM

## 2024-10-07 DIAGNOSIS — S92.345A CLOSED NONDISPLACED FRACTURE OF FOURTH METATARSAL BONE OF LEFT FOOT, INITIAL ENCOUNTER: ICD-10-CM

## 2024-10-07 DIAGNOSIS — S92.315B OPEN NONDISPLACED FRACTURE OF FIRST METATARSAL BONE OF LEFT FOOT, INITIAL ENCOUNTER: Primary | ICD-10-CM

## 2024-10-07 DIAGNOSIS — R60.0 EDEMA, LOWER EXTREMITY: ICD-10-CM

## 2024-10-07 PROCEDURE — 4004F PT TOBACCO SCREEN RCVD TLK: CPT | Performed by: PODIATRIST

## 2024-10-07 PROCEDURE — G8417 CALC BMI ABV UP PARAM F/U: HCPCS | Performed by: PODIATRIST

## 2024-10-07 PROCEDURE — 99213 OFFICE O/P EST LOW 20 MIN: CPT | Performed by: PODIATRIST

## 2024-10-07 PROCEDURE — G8427 DOCREV CUR MEDS BY ELIG CLIN: HCPCS | Performed by: PODIATRIST

## 2024-10-07 PROCEDURE — G8484 FLU IMMUNIZE NO ADMIN: HCPCS | Performed by: PODIATRIST

## 2024-10-07 ASSESSMENT — ENCOUNTER SYMPTOMS
SHORTNESS OF BREATH: 0
COLOR CHANGE: 0
NAUSEA: 0
BACK PAIN: 0
DIARRHEA: 0

## 2024-10-07 NOTE — PROGRESS NOTES
[facility]  Return Patient Progress Note    Subjective: Xander Bell 43 y.o. male that presents with chief complaint of pain to the left foot.  Chief Complaint   Patient presents with    Foot Injury     Left feet was injury 9/21/24    Patient states that he fell and struck his foot on concrete almost three weeks ago. Patient went to the ED and had xrays taken. Patient was told that he fractured his foot and sustained a laceration to the base of the left big toe. Patient's wound was sutured closed and he was placed in a CAM boot and given crutches. Patient was referred to my office for care.  Pain is rated 10 out of 10 and is described as intermittent.    Review of Systems   Constitutional:  Negative for activity change, appetite change, chills, diaphoresis, fatigue and fever.   Respiratory:  Negative for shortness of breath.    Cardiovascular:  Negative for leg swelling.   Gastrointestinal:  Negative for diarrhea and nausea.   Endocrine: Negative for cold intolerance, heat intolerance and polyuria.   Musculoskeletal:  Positive for gait problem and joint swelling. Negative for arthralgias, back pain and myalgias.   Skin:  Positive for wound. Negative for color change, pallor and rash.   Allergic/Immunologic: Negative for environmental allergies and food allergies.   Neurological:  Negative for dizziness, weakness, light-headedness and numbness.   Hematological:  Does not bruise/bleed easily.   Psychiatric/Behavioral:  Negative for behavioral problems, confusion and self-injury. The patient is not nervous/anxious.        Objective: Clinical evaluation of the patient reveals edema to the left foot.  There is a closed wound along the plantar and lateral aspects of the base of the left hallux.  This wound is well coapted.  Clinically there is no malalignment noted to the left foot.  There is pain with palpation to all metatarsals of the left foot.  There is no erythema or calor noted.  X-ray's taken: AP,

## 2024-10-08 ENCOUNTER — HOSPITAL ENCOUNTER (OUTPATIENT)
Age: 44
Setting detail: SPECIMEN
Discharge: HOME | End: 2024-10-08

## 2024-10-08 DIAGNOSIS — E83.51 HYPOCALCEMIA: ICD-10-CM

## 2024-10-08 DIAGNOSIS — D64.9 ANEMIA, UNSPECIFIED TYPE: ICD-10-CM

## 2024-10-08 DIAGNOSIS — R10.13 EPIGASTRIC PAIN: ICD-10-CM

## 2024-10-08 LAB
25(OH)D3 SERPL-MCNC: 24.2 NG/ML (ref 30–100)
ALBUMIN SERPL-MCNC: 4.1 G/DL (ref 3.5–5.2)
ALBUMIN/GLOB SERPL: 1 {RATIO} (ref 1–2.5)
ALP SERPL-CCNC: 115 U/L (ref 40–129)
ALT SERPL-CCNC: 22 U/L (ref 10–50)
ANION GAP SERPL CALCULATED.3IONS-SCNC: 9 MMOL/L (ref 9–16)
AST SERPL-CCNC: 20 U/L (ref 10–50)
BILIRUB SERPL-MCNC: 0.4 MG/DL (ref 0–1.2)
BUN SERPL-MCNC: 12 MG/DL (ref 6–20)
CALCIUM SERPL-MCNC: 8.8 MG/DL (ref 8.6–10.4)
CHLORIDE SERPL-SCNC: 101 MMOL/L (ref 98–107)
CO2 SERPL-SCNC: 27 MMOL/L (ref 20–31)
CREAT SERPL-MCNC: 0.8 MG/DL (ref 0.7–1.2)
ERYTHROCYTE [DISTWIDTH] IN BLOOD BY AUTOMATED COUNT: 14.2 % (ref 11.8–14.4)
GFR, ESTIMATED: >90 ML/MIN/1.73M2
GLUCOSE SERPL-MCNC: 79 MG/DL (ref 74–99)
HCT VFR BLD AUTO: 44.3 % (ref 40.7–50.3)
HGB BLD-MCNC: 14.5 G/DL (ref 13–17)
MCH RBC QN AUTO: 31 PG (ref 25.2–33.5)
MCHC RBC AUTO-ENTMCNC: 32.7 G/DL (ref 28.4–34.8)
MCV RBC AUTO: 94.7 FL (ref 82.6–102.9)
NRBC BLD-RTO: 0 PER 100 WBC
PLATELET # BLD AUTO: 250 K/UL (ref 138–453)
PMV BLD AUTO: 10.9 FL (ref 8.1–13.5)
POTASSIUM SERPL-SCNC: 3.9 MMOL/L (ref 3.7–5.3)
PROT SERPL-MCNC: 7 G/DL (ref 6.6–8.7)
RBC # BLD AUTO: 4.68 M/UL (ref 4.21–5.77)
SODIUM SERPL-SCNC: 137 MMOL/L (ref 136–145)
WBC OTHER # BLD: 7.7 K/UL (ref 3.5–11.3)

## 2024-10-10 ENCOUNTER — OFFICE VISIT (OUTPATIENT)
Dept: FAMILY MEDICINE CLINIC | Age: 44
End: 2024-10-10
Payer: MEDICAID

## 2024-10-10 VITALS
TEMPERATURE: 97.6 F | WEIGHT: 244 LBS | BODY MASS INDEX: 34.03 KG/M2 | SYSTOLIC BLOOD PRESSURE: 122 MMHG | OXYGEN SATURATION: 98 % | DIASTOLIC BLOOD PRESSURE: 74 MMHG | HEART RATE: 81 BPM

## 2024-10-10 DIAGNOSIS — K29.70 GASTROESOPHAGITIS: ICD-10-CM

## 2024-10-10 DIAGNOSIS — S92.902D CLOSED FRACTURE OF LEFT FOOT WITH ROUTINE HEALING, SUBSEQUENT ENCOUNTER: ICD-10-CM

## 2024-10-10 DIAGNOSIS — E55.9 VITAMIN D DEFICIENCY: Primary | ICD-10-CM

## 2024-10-10 DIAGNOSIS — K20.90 GASTROESOPHAGITIS: ICD-10-CM

## 2024-10-10 DIAGNOSIS — G57.72 COMPLEX REGIONAL PAIN SYNDROME TYPE 2 OF LEFT LOWER EXTREMITY: ICD-10-CM

## 2024-10-10 PROCEDURE — G8484 FLU IMMUNIZE NO ADMIN: HCPCS

## 2024-10-10 PROCEDURE — G8427 DOCREV CUR MEDS BY ELIG CLIN: HCPCS

## 2024-10-10 PROCEDURE — G8417 CALC BMI ABV UP PARAM F/U: HCPCS

## 2024-10-10 PROCEDURE — 99213 OFFICE O/P EST LOW 20 MIN: CPT

## 2024-10-10 PROCEDURE — 4004F PT TOBACCO SCREEN RCVD TLK: CPT

## 2024-10-10 RX ORDER — ACETAMINOPHEN 160 MG
2000 TABLET,DISINTEGRATING ORAL DAILY
Qty: 60 CAPSULE | Refills: 1 | Status: SHIPPED | OUTPATIENT
Start: 2024-10-10

## 2024-10-10 ASSESSMENT — ENCOUNTER SYMPTOMS
DIARRHEA: 0
ABDOMINAL PAIN: 0
VOMITING: 0
WHEEZING: 0
CONSTIPATION: 0
SHORTNESS OF BREATH: 0
NAUSEA: 0
COLOR CHANGE: 0
COUGH: 0

## 2024-10-10 NOTE — ASSESSMENT & PLAN NOTE
Monitored by specialist- no acute findings meriting change in the plan, to continue supportive care and CAM boot and ambulate with crutches

## 2024-10-10 NOTE — ASSESSMENT & PLAN NOTE
Replace    Orders:    vitamin D (VITAMIN D3) 50 MCG (2000 UT) CAPS capsule; Take 1 capsule by mouth daily    Vitamin D 25 Hydroxy; Future

## 2024-10-10 NOTE — ASSESSMENT & PLAN NOTE
Monitored by specialist- no acute findings meriting change in the plan, reviewed gastroscopy and biopsy, to continue Pepcid and Prilosec
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
## 2024-10-19 DIAGNOSIS — I82.412 ACUTE DEEP VEIN THROMBOSIS (DVT) OF FEMORAL VEIN OF LEFT LOWER EXTREMITY (HCC): ICD-10-CM

## 2024-10-21 RX ORDER — APIXABAN 5 MG/1
5 TABLET, FILM COATED ORAL 2 TIMES DAILY
Qty: 60 TABLET | OUTPATIENT
Start: 2024-10-21

## 2024-10-29 ENCOUNTER — OFFICE VISIT (OUTPATIENT)
Dept: PODIATRY | Age: 44
End: 2024-10-29
Payer: MEDICAID

## 2024-10-29 ENCOUNTER — OFFICE VISIT (OUTPATIENT)
Dept: VASCULAR SURGERY | Age: 44
End: 2024-10-29
Payer: MEDICAID

## 2024-10-29 VITALS
WEIGHT: 246 LBS | HEART RATE: 101 BPM | DIASTOLIC BLOOD PRESSURE: 77 MMHG | BODY MASS INDEX: 34.44 KG/M2 | SYSTOLIC BLOOD PRESSURE: 126 MMHG | OXYGEN SATURATION: 98 % | RESPIRATION RATE: 16 BRPM | HEIGHT: 71 IN

## 2024-10-29 VITALS — BODY MASS INDEX: 34.44 KG/M2 | HEIGHT: 71 IN | WEIGHT: 246 LBS

## 2024-10-29 DIAGNOSIS — M79.672 PAIN IN LEFT FOOT: ICD-10-CM

## 2024-10-29 DIAGNOSIS — I83.93 ASYMPTOMATIC VARICOSE VEINS OF BOTH LOWER EXTREMITIES: ICD-10-CM

## 2024-10-29 DIAGNOSIS — S92.335A CLOSED NONDISPLACED FRACTURE OF THIRD METATARSAL BONE OF LEFT FOOT, INITIAL ENCOUNTER: ICD-10-CM

## 2024-10-29 DIAGNOSIS — S92.315D CLOSED NONDISPLACED FRACTURE OF FIRST METATARSAL BONE OF LEFT FOOT WITH ROUTINE HEALING, SUBSEQUENT ENCOUNTER: Primary | ICD-10-CM

## 2024-10-29 DIAGNOSIS — S92.325A CLOSED NONDISPLACED FRACTURE OF SECOND METATARSAL BONE OF LEFT FOOT, INITIAL ENCOUNTER: ICD-10-CM

## 2024-10-29 DIAGNOSIS — R60.0 EDEMA, LOWER EXTREMITY: ICD-10-CM

## 2024-10-29 DIAGNOSIS — S92.345A CLOSED NONDISPLACED FRACTURE OF FOURTH METATARSAL BONE OF LEFT FOOT, INITIAL ENCOUNTER: ICD-10-CM

## 2024-10-29 DIAGNOSIS — Z86.718 HISTORY OF DVT (DEEP VEIN THROMBOSIS): Primary | ICD-10-CM

## 2024-10-29 DIAGNOSIS — I87.2 VENOUS INSUFFICIENCY OF BOTH LOWER EXTREMITIES: ICD-10-CM

## 2024-10-29 PROCEDURE — G8484 FLU IMMUNIZE NO ADMIN: HCPCS | Performed by: PODIATRIST

## 2024-10-29 PROCEDURE — G8427 DOCREV CUR MEDS BY ELIG CLIN: HCPCS | Performed by: PODIATRIST

## 2024-10-29 PROCEDURE — G8417 CALC BMI ABV UP PARAM F/U: HCPCS | Performed by: PODIATRIST

## 2024-10-29 PROCEDURE — G8417 CALC BMI ABV UP PARAM F/U: HCPCS | Performed by: SURGERY

## 2024-10-29 PROCEDURE — G8427 DOCREV CUR MEDS BY ELIG CLIN: HCPCS | Performed by: SURGERY

## 2024-10-29 PROCEDURE — 99213 OFFICE O/P EST LOW 20 MIN: CPT | Performed by: PODIATRIST

## 2024-10-29 PROCEDURE — 99213 OFFICE O/P EST LOW 20 MIN: CPT | Performed by: SURGERY

## 2024-10-29 PROCEDURE — G8484 FLU IMMUNIZE NO ADMIN: HCPCS | Performed by: SURGERY

## 2024-10-29 PROCEDURE — 4004F PT TOBACCO SCREEN RCVD TLK: CPT | Performed by: PODIATRIST

## 2024-10-29 PROCEDURE — 4004F PT TOBACCO SCREEN RCVD TLK: CPT | Performed by: SURGERY

## 2024-10-29 ASSESSMENT — ENCOUNTER SYMPTOMS
COLOR CHANGE: 0
DIARRHEA: 0
SHORTNESS OF BREATH: 0
BACK PAIN: 0
NAUSEA: 0
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
## 2024-10-29 NOTE — PROGRESS NOTES
[facility]  Return Patient Progress Note    Subjective: Xander Bell 43 y.o. male that presents for follow up evaluation of fractures left foot.  Chief Complaint   Patient presents with    Foot Injury     Left foot     Patient's treatment thus far has included CAM Walker with crutches, RICE.  Pain is rated 5 out of 10 and is described as intermittent. Patient has been following my prescribed course of therapy as instructed.     Review of Systems   Constitutional:  Negative for activity change, appetite change, chills, diaphoresis, fatigue and fever.   Respiratory:  Negative for shortness of breath.    Cardiovascular:  Negative for leg swelling.   Gastrointestinal:  Negative for diarrhea and nausea.   Endocrine: Negative for cold intolerance, heat intolerance and polyuria.   Musculoskeletal:  Positive for gait problem and joint swelling. Negative for arthralgias, back pain and myalgias.   Skin:  Positive for wound. Negative for color change, pallor and rash.   Allergic/Immunologic: Negative for environmental allergies and food allergies.   Neurological:  Negative for dizziness, weakness, light-headedness and numbness.   Hematological:  Does not bruise/bleed easily.   Psychiatric/Behavioral:  Negative for behavioral problems, confusion and self-injury. The patient is not nervous/anxious.        Objective: Clinical evaluation of the patient reveals edema to the left foot.  There is no remaining wound to the base of the left hallux.  Clinically there is no malalignment noted to the left foot.  There is only mild remaining pain with palpation to the metatarsals of the left foot.  There is no erythema or calor noted.  X-ray's taken: AP, Lateral, and Medial Oblique of the left foot. Findings: There is a nondisplaced fracture to the first metatarsal shaft.  There is an increase in osseous bridging noted to this fracture.    Assessment:    Diagnosis Orders   1. Closed nondisplaced fracture of first metatarsal

## 2024-10-30 PROBLEM — I83.93 ASYMPTOMATIC VARICOSE VEINS OF BOTH LOWER EXTREMITIES: Status: ACTIVE | Noted: 2024-05-18

## 2024-10-30 ASSESSMENT — ENCOUNTER SYMPTOMS
COLOR CHANGE: 0
SHORTNESS OF BREATH: 0
ABDOMINAL PAIN: 0
ALLERGIC/IMMUNOLOGIC NEGATIVE: 1
CHEST TIGHTNESS: 0

## 2024-11-12 ENCOUNTER — OFFICE VISIT (OUTPATIENT)
Dept: PODIATRY | Age: 44
End: 2024-11-12
Payer: MEDICAID

## 2024-11-12 VITALS — BODY MASS INDEX: 34.3 KG/M2 | WEIGHT: 245 LBS | HEIGHT: 71 IN

## 2024-11-12 DIAGNOSIS — S92.325A CLOSED NONDISPLACED FRACTURE OF SECOND METATARSAL BONE OF LEFT FOOT, INITIAL ENCOUNTER: ICD-10-CM

## 2024-11-12 DIAGNOSIS — S92.315D CLOSED NONDISPLACED FRACTURE OF FIRST METATARSAL BONE OF LEFT FOOT WITH ROUTINE HEALING, SUBSEQUENT ENCOUNTER: Primary | ICD-10-CM

## 2024-11-12 DIAGNOSIS — M79.672 PAIN IN LEFT FOOT: ICD-10-CM

## 2024-11-12 DIAGNOSIS — S92.335A CLOSED NONDISPLACED FRACTURE OF THIRD METATARSAL BONE OF LEFT FOOT, INITIAL ENCOUNTER: ICD-10-CM

## 2024-11-12 DIAGNOSIS — S92.345A CLOSED NONDISPLACED FRACTURE OF FOURTH METATARSAL BONE OF LEFT FOOT, INITIAL ENCOUNTER: ICD-10-CM

## 2024-11-12 DIAGNOSIS — R60.0 EDEMA, LOWER EXTREMITY: ICD-10-CM

## 2024-11-12 PROCEDURE — G8417 CALC BMI ABV UP PARAM F/U: HCPCS | Performed by: PODIATRIST

## 2024-11-12 PROCEDURE — 4004F PT TOBACCO SCREEN RCVD TLK: CPT | Performed by: PODIATRIST

## 2024-11-12 PROCEDURE — G8484 FLU IMMUNIZE NO ADMIN: HCPCS | Performed by: PODIATRIST

## 2024-11-12 PROCEDURE — 99213 OFFICE O/P EST LOW 20 MIN: CPT | Performed by: PODIATRIST

## 2024-11-12 PROCEDURE — G8427 DOCREV CUR MEDS BY ELIG CLIN: HCPCS | Performed by: PODIATRIST

## 2024-11-12 NOTE — PROGRESS NOTES
[facility]  Return Patient Progress Note    Subjective: Xander Bell 43 y.o. male that presents for follow up evaluation of fracture left foot.  Chief Complaint   Patient presents with    Foot Pain     Left foot, not any better    Patient's treatment thus far has included CAM Walker with crutches, RICE.  Pain is rated 4 out of 10 and is described as intermittent. Patient has been following my prescribed course of therapy as instructed.     Review of Systems   Constitutional:  Negative for activity change, appetite change, chills, diaphoresis, fatigue and fever.   Respiratory:  Negative for shortness of breath.    Cardiovascular:  Negative for leg swelling.   Gastrointestinal:  Negative for diarrhea and nausea.   Endocrine: Negative for cold intolerance, heat intolerance and polyuria.   Musculoskeletal:  Positive for gait problem and joint swelling. Negative for arthralgias, back pain and myalgias.   Skin:  Positive for wound. Negative for color change, pallor and rash.   Allergic/Immunologic: Negative for environmental allergies and food allergies.   Neurological:  Negative for dizziness, weakness, light-headedness and numbness.   Hematological:  Does not bruise/bleed easily.   Psychiatric/Behavioral:  Negative for behavioral problems, confusion and self-injury. The patient is not nervous/anxious.        Objective: Clinical evaluation of the patient reveals edema to the left foot. Clinically there is no malalignment noted to the left foot.  There is only mild pain with palpation to the metatarsals of the left foot.  There is no erythema or calor noted.  X-ray's taken: AP, Lateral, and Medial Oblique of the left foot. Findings: There is a nondisplaced fracture remaining to the first metatarsal shaft.  There is again increase in osseous bridging noted to this fracture.     Assessment:    Diagnosis Orders   1. Closed nondisplaced fracture of first metatarsal bone of left foot with routine healing,

## 2024-11-13 DIAGNOSIS — T81.89XD DELAYED SURGICAL WOUND HEALING, SUBSEQUENT ENCOUNTER: ICD-10-CM

## 2024-11-13 DIAGNOSIS — I82.412 ACUTE DEEP VEIN THROMBOSIS (DVT) OF FEMORAL VEIN OF LEFT LOWER EXTREMITY (HCC): ICD-10-CM

## 2024-11-13 DIAGNOSIS — A60.01 HERPES SIMPLEX INFECTION OF PENIS: ICD-10-CM

## 2024-11-13 DIAGNOSIS — F41.9 ANXIETY: ICD-10-CM
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
## 2024-11-13 DIAGNOSIS — Z98.890 POST-OPERATIVE STATE: ICD-10-CM

## 2024-11-13 DIAGNOSIS — G57.32 PERONEAL NEUROPATHY AT KNEE, LEFT: ICD-10-CM

## 2024-11-13 DIAGNOSIS — F32.A DEPRESSION, UNSPECIFIED DEPRESSION TYPE: ICD-10-CM

## 2024-11-13 DIAGNOSIS — Z72.0 TOBACCO USE: ICD-10-CM

## 2024-11-13 DIAGNOSIS — G57.72 COMPLEX REGIONAL PAIN SYNDROME TYPE 2 OF LEFT LOWER EXTREMITY: ICD-10-CM

## 2024-11-14 RX ORDER — SULFAMETHOXAZOLE AND TRIMETHOPRIM 800; 160 MG/1; MG/1
TABLET ORAL
Qty: 28 TABLET | Refills: 0 | OUTPATIENT
Start: 2024-11-14

## 2024-11-14 ASSESSMENT — ENCOUNTER SYMPTOMS
BACK PAIN: 0
DIARRHEA: 0
SHORTNESS OF BREATH: 0
COLOR CHANGE: 0
NAUSEA: 0

## 2024-11-15 RX ORDER — VALACYCLOVIR HYDROCHLORIDE 500 MG/1
TABLET, FILM COATED ORAL
Qty: 30 TABLET | Refills: 3 | Status: SHIPPED | OUTPATIENT
Start: 2024-11-15

## 2024-11-15 RX ORDER — APIXABAN 5 MG/1
5 TABLET, FILM COATED ORAL 2 TIMES DAILY
Qty: 60 TABLET | OUTPATIENT
Start: 2024-11-15

## 2024-11-15 RX ORDER — DULOXETIN HYDROCHLORIDE 60 MG/1
60 CAPSULE, DELAYED RELEASE ORAL DAILY
Qty: 90 CAPSULE | Refills: 1 | Status: SHIPPED | OUTPATIENT
Start: 2024-11-15 | End: 2025-11-15

## 2024-11-15 NOTE — TELEPHONE ENCOUNTER
Xander Bell is calling to request a refill on the following medication(s):    Medication Request:  Requested Prescriptions     Pending Prescriptions Disp Refills    ELIQUIS 5 MG TABS tablet [Pharmacy Med Name: ELIQUIS 5 MG TABLET] 60 tablet      Sig: TAKE 1 TABLET BY MOUTH 2 TIMES A DAY    DULoxetine (CYMBALTA) 60 MG extended release capsule [Pharmacy Med Name: DULoxetine HCL DR 60 MG CAPSULE] 90 capsule 1     Sig: TAKE 1 CAPSULE BY MOUTH DAILY    valACYclovir (VALTREX) 500 MG tablet [Pharmacy Med Name: valACYclovir  MG TABLET] 30 tablet 3     Sig: TAKE 1 TABLET BY MOUTH DAILY AS NEEDED FOR ONSET OF LESIONS       Last Visit Date (If Applicable):  9/12/2024    Next Visit Date:    01/15/2025

## 2024-11-26 ENCOUNTER — OFFICE VISIT (OUTPATIENT)
Dept: PODIATRY | Age: 44
End: 2024-11-26
Payer: MEDICARE

## 2024-11-26 VITALS — WEIGHT: 245 LBS | HEIGHT: 71 IN | BODY MASS INDEX: 34.3 KG/M2

## 2024-11-26 DIAGNOSIS — M79.672 PAIN IN LEFT FOOT: ICD-10-CM

## 2024-11-26 DIAGNOSIS — R60.0 EDEMA, LOWER EXTREMITY: ICD-10-CM

## 2024-11-26 DIAGNOSIS — S92.315D CLOSED NONDISPLACED FRACTURE OF FIRST METATARSAL BONE OF LEFT FOOT WITH ROUTINE HEALING, SUBSEQUENT ENCOUNTER: Primary | ICD-10-CM

## 2024-11-26 PROCEDURE — 99213 OFFICE O/P EST LOW 20 MIN: CPT | Performed by: PODIATRIST

## 2024-11-26 PROCEDURE — G8417 CALC BMI ABV UP PARAM F/U: HCPCS | Performed by: PODIATRIST

## 2024-11-26 PROCEDURE — 4004F PT TOBACCO SCREEN RCVD TLK: CPT | Performed by: PODIATRIST

## 2024-11-26 PROCEDURE — G8484 FLU IMMUNIZE NO ADMIN: HCPCS | Performed by: PODIATRIST

## 2024-11-26 PROCEDURE — G8427 DOCREV CUR MEDS BY ELIG CLIN: HCPCS | Performed by: PODIATRIST

## 2024-11-26 ASSESSMENT — ENCOUNTER SYMPTOMS
NAUSEA: 0
DIARRHEA: 0
BACK PAIN: 0
COLOR CHANGE: 0
SHORTNESS OF BREATH: 0

## 2024-11-26 NOTE — PROGRESS NOTES
lower extremity  XR FOOT LEFT (MIN 3 VIEWS)      3. Pain in left foot  XR FOOT LEFT (MIN 3 VIEWS)            Plan: 1. Clinical evaluation of the patient. 2.  Patient informed that he has adequately healed and he may resume regular shoewear and activity at this time.  3. Return if symptoms worsen or fail to improve.   11/26/2024      José Miguel Ruiz DPM

## 2024-12-10 ENCOUNTER — OFFICE VISIT (OUTPATIENT)
Dept: PAIN MANAGEMENT | Age: 44
End: 2024-12-10
Payer: MEDICARE

## 2024-12-10 VITALS — WEIGHT: 245 LBS | BODY MASS INDEX: 34.3 KG/M2 | HEIGHT: 71 IN

## 2024-12-10 DIAGNOSIS — M79.2 NEUROPATHIC PAIN: Primary | ICD-10-CM

## 2024-12-10 DIAGNOSIS — G57.72 COMPLEX REGIONAL PAIN SYNDROME TYPE 2 OF LEFT LOWER EXTREMITY: ICD-10-CM

## 2024-12-10 DIAGNOSIS — G89.29 OTHER CHRONIC PAIN: ICD-10-CM

## 2024-12-10 PROCEDURE — 4004F PT TOBACCO SCREEN RCVD TLK: CPT | Performed by: PAIN MEDICINE

## 2024-12-10 PROCEDURE — G8417 CALC BMI ABV UP PARAM F/U: HCPCS | Performed by: PAIN MEDICINE

## 2024-12-10 PROCEDURE — G8484 FLU IMMUNIZE NO ADMIN: HCPCS | Performed by: PAIN MEDICINE

## 2024-12-10 PROCEDURE — 99214 OFFICE O/P EST MOD 30 MIN: CPT | Performed by: PAIN MEDICINE

## 2024-12-10 PROCEDURE — G8427 DOCREV CUR MEDS BY ELIG CLIN: HCPCS | Performed by: PAIN MEDICINE

## 2024-12-10 RX ORDER — CYCLOBENZAPRINE HCL 10 MG
10 TABLET ORAL 2 TIMES DAILY PRN
Qty: 60 TABLET | Refills: 2 | Status: SHIPPED | OUTPATIENT
Start: 2024-12-10 | End: 2025-03-10

## 2024-12-10 RX ORDER — GABAPENTIN 600 MG/1
600 TABLET ORAL 3 TIMES DAILY
Qty: 90 TABLET | Refills: 2 | Status: SHIPPED | OUTPATIENT
Start: 2024-12-10 | End: 2025-03-10

## 2024-12-10 NOTE — PROGRESS NOTES
HPI:     Leg Pain   The incident occurred more than 1 week ago. The injury mechanism was a direct blow. The pain is present in the left leg. The quality of the pain is described as aching, burning, cramping, shooting and stabbing. The pain has been Fluctuating since onset. The symptoms are aggravated by movement and weight bearing. He has tried heat, ice and rest for the symptoms.     Chronic left leg pain.  Had peroneal nerve decompression earlier this year with neurosurgery.  Also had a DVT.  Was on anticoagulation, has followed up with vascular surgery and anticoagulation has been discontinued.  Also broke his left foot and followed with podiatry and they have cleared him to return to weightbearing and normal shoes.  He did see my colleague for potential spinal cord stimulation however was on anticoagulation at that time.  He is on gabapentin 600 3 times daily.  He is also on muscle relaxant as needed.  Also takes Cymbalta.    Pain ranges from a 6/10 to a 8/10 depending on activity.    Patient denies any new neurological symptoms. Nobowel or bladder incontinence, no weakness, and no falling.    Review of OARRS does not show any aberrant prescription behavior.     Past Medical History:   Diagnosis Date    Anxiety and depression     COVID-19 vaccine series not administered     Hx of blood clots 2024    [id number]    Hypertension     Scalp hematoma 07/29/2020    Stomach ache     \"most of the time\"    Under care of service provider     GI Dr Aleman / last visit 4/18/24    Under care of service provider     Pain Management Dr. Katz / last visit 4/9/24    Under care of service provider     Neurosurgery Dr. Marino / last visit 4/4/24    Under care of service provider     Ortho Dr. Abelino Pacheco / last visit Jan 2024    Wellness examination     PCP KYA Trevino-CNP / last visit Feb 2024       Past Surgical History:   Procedure Laterality Date    HERNIA REPAIR  12/03/2003    umbilical    INGUINAL HERNIA REPAIR Right

## 2024-12-10 NOTE — PROGRESS NOTES
Department of Neurosurgery                                                      Follow up visit      History Obtained From:  patient    CHIEF COMPLAINT:         Chief Complaint   Patient presents with    Peroneal neuropathy at knee, left       HISTORY OF PRESENT ILLNESS:       The patient is a 44 y.o. 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    Patient reports that his left lower extremity pain has remained stable since the last visit. He describes the pain as being in his shin and ankle. He expresses that his shin is quite sensitive, and that when he walks he feels pain in the ankle and arch of the foot. He says that since surgery he afforded approximately 25% reduction of pain. This has remained stable since surgery. He continues to experience difficulty with raising his left foot upward and outward. He continues to do home exercises and was at one point in aquatic therapy. He receives the most relief from his pain by elevating his feet.    Xander informs us that had broken his foot in July of this year where he had broken all of the digits of this foot. These fractures have reportedly recovered and he is now wearing normal shoes again, but he complains that he still experiences a significant amount of pain in the foot. He is looking into better shoes that support AFO braces.    He has spoken to pain management about possibly obtaining a SCS trial but at that time was still on anticoagulants following concerns for a DVT. He has seen vascular and is no longer taking any blood thinners.    The patient is not working at this time. He feels that if his pain were reduced he would be able to work. He says that even doing work around the house can be difficult and cause his foot to become swollen and painful. Patient no longer smokes.    PAST MEDICAL HISTORY :       Past Medical History:        Diagnosis Date

## 2024-12-12 ENCOUNTER — OFFICE VISIT (OUTPATIENT)
Dept: NEUROSURGERY | Age: 44
End: 2024-12-12
Payer: MEDICARE

## 2024-12-12 VITALS
OXYGEN SATURATION: 98 % | DIASTOLIC BLOOD PRESSURE: 71 MMHG | RESPIRATION RATE: 22 BRPM | SYSTOLIC BLOOD PRESSURE: 122 MMHG | HEART RATE: 105 BPM | WEIGHT: 262 LBS | BODY MASS INDEX: 36.54 KG/M2 | TEMPERATURE: 98 F

## 2024-12-12 DIAGNOSIS — G57.32 PERONEAL NEUROPATHY AT KNEE, LEFT: Primary | ICD-10-CM

## 2024-12-12 DIAGNOSIS — G57.72 COMPLEX REGIONAL PAIN SYNDROME TYPE 2 OF LEFT LOWER EXTREMITY: ICD-10-CM

## 2024-12-12 PROCEDURE — G8484 FLU IMMUNIZE NO ADMIN: HCPCS | Performed by: NEUROLOGICAL SURGERY

## 2024-12-12 PROCEDURE — G8427 DOCREV CUR MEDS BY ELIG CLIN: HCPCS | Performed by: NEUROLOGICAL SURGERY

## 2024-12-12 PROCEDURE — G8417 CALC BMI ABV UP PARAM F/U: HCPCS | Performed by: NEUROLOGICAL SURGERY

## 2024-12-12 PROCEDURE — 4004F PT TOBACCO SCREEN RCVD TLK: CPT | Performed by: NEUROLOGICAL SURGERY

## 2024-12-12 PROCEDURE — 99214 OFFICE O/P EST MOD 30 MIN: CPT | Performed by: NEUROLOGICAL SURGERY

## 2025-01-15 ENCOUNTER — TELEPHONE (OUTPATIENT)
Dept: FAMILY MEDICINE CLINIC | Age: 45
End: 2025-01-15

## 2025-01-15 ENCOUNTER — HOSPITAL ENCOUNTER (OUTPATIENT)
Dept: VASCULAR LAB | Age: 45
Discharge: HOME | End: 2025-01-17
Payer: MEDICARE

## 2025-01-15 ENCOUNTER — OFFICE VISIT (OUTPATIENT)
Dept: FAMILY MEDICINE CLINIC | Age: 45
End: 2025-01-15
Payer: MEDICARE

## 2025-01-15 VITALS
SYSTOLIC BLOOD PRESSURE: 124 MMHG | DIASTOLIC BLOOD PRESSURE: 70 MMHG | HEART RATE: 99 BPM | OXYGEN SATURATION: 95 % | TEMPERATURE: 98.3 F | WEIGHT: 259.2 LBS | BODY MASS INDEX: 36.15 KG/M2

## 2025-01-15 DIAGNOSIS — M79.652 LEFT THIGH PAIN: ICD-10-CM

## 2025-01-15 DIAGNOSIS — K21.9 GASTROESOPHAGEAL REFLUX DISEASE, UNSPECIFIED WHETHER ESOPHAGITIS PRESENT: ICD-10-CM

## 2025-01-15 DIAGNOSIS — G57.72 COMPLEX REGIONAL PAIN SYNDROME TYPE 2 OF LEFT LOWER EXTREMITY: ICD-10-CM

## 2025-01-15 DIAGNOSIS — M79.652 LEFT THIGH PAIN: Primary | ICD-10-CM

## 2025-01-15 DIAGNOSIS — F33.1 MODERATE EPISODE OF RECURRENT MAJOR DEPRESSIVE DISORDER (HCC): ICD-10-CM

## 2025-01-15 DIAGNOSIS — I82.439 ACUTE DEEP VEIN THROMBOSIS (DVT) OF POPLITEAL VEIN, UNSPECIFIED LATERALITY (HCC): Primary | ICD-10-CM

## 2025-01-15 PROBLEM — F41.9 ANXIETY: Status: ACTIVE | Noted: 2025-01-15

## 2025-01-15 PROBLEM — F32.A DEPRESSION: Status: ACTIVE | Noted: 2025-01-15

## 2025-01-15 LAB
VAS LEFT FV RFX: 0.9 S
VAS LEFT POP RFX: 2 S

## 2025-01-15 PROCEDURE — 4004F PT TOBACCO SCREEN RCVD TLK: CPT

## 2025-01-15 PROCEDURE — 93971 EXTREMITY STUDY: CPT | Performed by: SURGERY

## 2025-01-15 PROCEDURE — G8417 CALC BMI ABV UP PARAM F/U: HCPCS

## 2025-01-15 PROCEDURE — 99214 OFFICE O/P EST MOD 30 MIN: CPT

## 2025-01-15 PROCEDURE — 93971 EXTREMITY STUDY: CPT

## 2025-01-15 PROCEDURE — G8427 DOCREV CUR MEDS BY ELIG CLIN: HCPCS

## 2025-01-15 RX ORDER — DULOXETIN HYDROCHLORIDE 60 MG/1
60 CAPSULE, DELAYED RELEASE ORAL DAILY
Qty: 90 CAPSULE | Refills: 1 | Status: SHIPPED | OUTPATIENT
Start: 2025-01-15 | End: 2026-01-15

## 2025-01-15 SDOH — ECONOMIC STABILITY: FOOD INSECURITY: WITHIN THE PAST 12 MONTHS, THE FOOD YOU BOUGHT JUST DIDN'T LAST AND YOU DIDN'T HAVE MONEY TO GET MORE.: SOMETIMES TRUE

## 2025-01-15 SDOH — ECONOMIC STABILITY: FOOD INSECURITY: WITHIN THE PAST 12 MONTHS, YOU WORRIED THAT YOUR FOOD WOULD RUN OUT BEFORE YOU GOT MONEY TO BUY MORE.: SOMETIMES TRUE

## 2025-01-15 SDOH — ECONOMIC STABILITY: INCOME INSECURITY: IN THE LAST 12 MONTHS, WAS THERE A TIME WHEN YOU WERE NOT ABLE TO PAY THE MORTGAGE OR RENT ON TIME?: NO

## 2025-01-15 SDOH — ECONOMIC STABILITY: TRANSPORTATION INSECURITY
IN THE PAST 12 MONTHS, HAS THE LACK OF TRANSPORTATION KEPT YOU FROM MEDICAL APPOINTMENTS OR FROM GETTING MEDICATIONS?: YES

## 2025-01-15 ASSESSMENT — PATIENT HEALTH QUESTIONNAIRE - PHQ9
SUM OF ALL RESPONSES TO PHQ9 QUESTIONS 1 & 2: 2
2. FEELING DOWN, DEPRESSED OR HOPELESS: NOT AT ALL
7. TROUBLE CONCENTRATING ON THINGS, SUCH AS READING THE NEWSPAPER OR WATCHING TELEVISION: MORE THAN HALF THE DAYS
3. TROUBLE FALLING OR STAYING ASLEEP: MORE THAN HALF THE DAYS
9. THOUGHTS THAT YOU WOULD BE BETTER OFF DEAD, OR OF HURTING YOURSELF: NOT AT ALL
10. IF YOU CHECKED OFF ANY PROBLEMS, HOW DIFFICULT HAVE THESE PROBLEMS MADE IT FOR YOU TO DO YOUR WORK, TAKE CARE OF THINGS AT HOME, OR GET ALONG WITH OTHER PEOPLE: SOMEWHAT DIFFICULT
4. FEELING TIRED OR HAVING LITTLE ENERGY: MORE THAN HALF THE DAYS
4. FEELING TIRED OR HAVING LITTLE ENERGY: MORE THAN HALF THE DAYS
2. FEELING DOWN, DEPRESSED OR HOPELESS: NOT AT ALL
10. IF YOU CHECKED OFF ANY PROBLEMS, HOW DIFFICULT HAVE THESE PROBLEMS MADE IT FOR YOU TO DO YOUR WORK, TAKE CARE OF THINGS AT HOME, OR GET ALONG WITH OTHER PEOPLE: SOMEWHAT DIFFICULT
6. FEELING BAD ABOUT YOURSELF - OR THAT YOU ARE A FAILURE OR HAVE LET YOURSELF OR YOUR FAMILY DOWN: NOT AT ALL
1. LITTLE INTEREST OR PLEASURE IN DOING THINGS: MORE THAN HALF THE DAYS
SUM OF ALL RESPONSES TO PHQ QUESTIONS 1-9: 10
5. POOR APPETITE OR OVEREATING: MORE THAN HALF THE DAYS
3. TROUBLE FALLING OR STAYING ASLEEP: MORE THAN HALF THE DAYS
9. THOUGHTS THAT YOU WOULD BE BETTER OFF DEAD, OR OF HURTING YOURSELF: NOT AT ALL
SUM OF ALL RESPONSES TO PHQ QUESTIONS 1-9: 10
7. TROUBLE CONCENTRATING ON THINGS, SUCH AS READING THE NEWSPAPER OR WATCHING TELEVISION: MORE THAN HALF THE DAYS
1. LITTLE INTEREST OR PLEASURE IN DOING THINGS: MORE THAN HALF THE DAYS
SUM OF ALL RESPONSES TO PHQ QUESTIONS 1-9: 10
5. POOR APPETITE OR OVEREATING: MORE THAN HALF THE DAYS
8. MOVING OR SPEAKING SO SLOWLY THAT OTHER PEOPLE COULD HAVE NOTICED. OR THE OPPOSITE, BEING SO FIGETY OR RESTLESS THAT YOU HAVE BEEN MOVING AROUND A LOT MORE THAN USUAL: NOT AT ALL
8. MOVING OR SPEAKING SO SLOWLY THAT OTHER PEOPLE COULD HAVE NOTICED. OR THE OPPOSITE - BEING SO FIDGETY OR RESTLESS THAT YOU HAVE BEEN MOVING AROUND A LOT MORE THAN USUAL: NOT AT ALL
6. FEELING BAD ABOUT YOURSELF - OR THAT YOU ARE A FAILURE OR HAVE LET YOURSELF OR YOUR FAMILY DOWN: NOT AT ALL
SUM OF ALL RESPONSES TO PHQ QUESTIONS 1-9: 10
SUM OF ALL RESPONSES TO PHQ QUESTIONS 1-9: 10

## 2025-01-15 NOTE — PATIENT INSTRUCTIONS
and Thursday 1:30-3pm.  Phone and Address: [phone]; [address] [facility] [address]  [facility] Mobile Pantry at [facility]  What they offer: Hot meal: Second and fourth Saturday from 12:45-1:15pm; Food pantry: Wednesday 6pm - 7pm (seasonal hours), cleaning supplies every second Wednesday, hygiene items every fourth Wednesday.  Phone and Address: [phone]; [address] [facility] [address]  [facility]  [employer]  What they offer: Choice food pantry and household items Tuesday through Friday 9am-3pm appointment only (scheduled for the following week)  Phone and Address: Call [phone]; [address] [facility] [address]  [facility] Choice Food Pantry  What they offer: Tuesday and Thursday 10am - 12pm, must register by phone  Phone and Address:  Call to register [phone]; [address] [facility] [address]  [facility] [employer]  What they offer: Food pantry: Monday-Friday 9:30-11am, Hot meal: Monday-Thursday 12-1:30pm  Phone Number: [phone]; [address] [facility] [address]  [facility] [place], [place]:  What they offer: Food Pantry (Appointment Only, [facility] residents)  Phone Number: [phone]  [place] [employer] ([employer]):  What they offer: Government programs including Medicaid, SNAP (food stamps), TANF (cash assistance), and childcare assistance.  Phone Number: [phone]      [facility]  What they offer: Food pantry, Monday-Thursday 9am-12pm, [facility] residents with picture ID  Phone and Address: [phone]; [address] [facility] [address]    [facility]  What they offer: Food pantry delivery within [place] boundaries  Phone number: [phone]  [facility] Food [facility]  What they offer: Food pantry, Monday-Thursday 9 AM - 12 PM, [facility]

## 2025-01-15 NOTE — TELEPHONE ENCOUNTER
Eliquis called to pharmacy- 10 mg BID x 7 days. He will need referral to vascular and also continuation of Elquis from you.

## 2025-01-15 NOTE — TELEPHONE ENCOUNTER
Aurea from [facility] called with Stat vascular duplex results. Results showed small popliteal vein clot. This was addressed with Smita Wilcox and recommendation was patient could go home and blood thinners would be sent to his pharmacy for him to start today. Also Dr Nicole will be notified and she will call with further recommendations. Patient uses [facility] on [address].

## 2025-01-16 RX ORDER — DULOXETIN HYDROCHLORIDE 30 MG/1
30 CAPSULE, DELAYED RELEASE ORAL DAILY
Qty: 90 CAPSULE | Refills: 1 | Status: SHIPPED | OUTPATIENT
Start: 2025-01-16

## 2025-01-16 NOTE — TELEPHONE ENCOUNTER
Thank you Marifer for your help while I was out  Pt already have Vascualr Doc, I have called him to discuss and advise him to call his vascualr and confirm the dose of the Eliquis if they want him to be on it

## 2025-01-17 DIAGNOSIS — I82.439 ACUTE DEEP VEIN THROMBOSIS (DVT) OF POPLITEAL VEIN, UNSPECIFIED LATERALITY (HCC): ICD-10-CM

## 2025-01-17 DIAGNOSIS — I82.5Y2 CHRONIC DEEP VEIN THROMBOSIS (DVT) OF PROXIMAL VEIN OF LEFT LOWER EXTREMITY (HCC): Primary | ICD-10-CM

## 2025-02-07 DIAGNOSIS — K21.00 GASTROESOPHAGEAL REFLUX DISEASE WITH ESOPHAGITIS WITHOUT HEMORRHAGE: ICD-10-CM

## 2025-02-07 DIAGNOSIS — R00.0 TACHYCARDIA: ICD-10-CM

## 2025-02-07 RX ORDER — METOPROLOL SUCCINATE 25 MG/1
25 TABLET, EXTENDED RELEASE ORAL DAILY
Qty: 60 TABLET | OUTPATIENT
Start: 2025-02-07

## 2025-02-07 RX ORDER — OMEPRAZOLE 20 MG/1
20 CAPSULE, DELAYED RELEASE ORAL
Qty: 60 CAPSULE | OUTPATIENT
Start: 2025-02-07

## 2025-02-10 DIAGNOSIS — E55.9 VITAMIN D DEFICIENCY: ICD-10-CM

## 2025-02-10 NOTE — TELEPHONE ENCOUNTER
Xander Bell is calling to request a refill on the following medication(s):    Medication Request:  Requested Prescriptions     Pending Prescriptions Disp Refills    VITAMIN D3 50 MCG (2000 UT) CAPS capsule [Pharmacy Med Name: VITAMIN D3 50 MCG SOFTGEL] 60 capsule 1     Sig: TAKE 1 CAPSULE BY MOUTH DAILY       Last Visit Date (If Applicable):  1/15/2025    Next Visit Date:    Visit date not found

## 2025-02-11 RX ORDER — ACETAMINOPHEN 160 MG
2000 TABLET,DISINTEGRATING ORAL DAILY
Qty: 60 CAPSULE | Refills: 1 | Status: SHIPPED | OUTPATIENT
Start: 2025-02-11

## 2025-02-20 DIAGNOSIS — K21.00 GASTROESOPHAGEAL REFLUX DISEASE WITH ESOPHAGITIS WITHOUT HEMORRHAGE: ICD-10-CM

## 2025-02-20 DIAGNOSIS — R00.0 TACHYCARDIA: ICD-10-CM

## 2025-02-20 RX ORDER — OMEPRAZOLE 20 MG/1
20 CAPSULE, DELAYED RELEASE ORAL
Qty: 60 CAPSULE | OUTPATIENT
Start: 2025-02-20

## 2025-02-20 RX ORDER — METOPROLOL SUCCINATE 25 MG/1
25 TABLET, EXTENDED RELEASE ORAL DAILY
Qty: 60 TABLET | OUTPATIENT
Start: 2025-02-20

## 2025-03-11 ENCOUNTER — OFFICE VISIT (OUTPATIENT)
Dept: PAIN MANAGEMENT | Age: 45
End: 2025-03-11
Payer: COMMERCIAL

## 2025-03-11 VITALS — WEIGHT: 259 LBS | BODY MASS INDEX: 36.26 KG/M2 | HEIGHT: 71 IN

## 2025-03-11 DIAGNOSIS — G89.29 OTHER CHRONIC PAIN: ICD-10-CM

## 2025-03-11 DIAGNOSIS — G57.72 COMPLEX REGIONAL PAIN SYNDROME TYPE 2 OF LEFT LOWER EXTREMITY: ICD-10-CM

## 2025-03-11 PROCEDURE — 99213 OFFICE O/P EST LOW 20 MIN: CPT | Performed by: PAIN MEDICINE

## 2025-03-11 RX ORDER — CYCLOBENZAPRINE HCL 10 MG
10 TABLET ORAL 2 TIMES DAILY PRN
Qty: 60 TABLET | Refills: 2 | Status: SHIPPED | OUTPATIENT
Start: 2025-03-11 | End: 2025-06-09

## 2025-03-11 RX ORDER — GABAPENTIN 600 MG/1
600 TABLET ORAL 3 TIMES DAILY
Qty: 90 TABLET | Refills: 2 | Status: SHIPPED | OUTPATIENT
Start: 2025-03-11 | End: 2025-06-09
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
## 2025-03-14 ENCOUNTER — HOSPITAL ENCOUNTER (OUTPATIENT)
Dept: PHYSICAL THERAPY | Facility: CLINIC | Age: 45
Setting detail: THERAPIES SERIES
Discharge: HOME | End: 2025-03-14
Attending: PAIN MEDICINE
Payer: COMMERCIAL

## 2025-03-14 PROCEDURE — 97163 PT EVAL HIGH COMPLEX 45 MIN: CPT

## 2025-03-14 PROCEDURE — 97530 THERAPEUTIC ACTIVITIES: CPT

## 2025-03-14 NOTE — CONSULTS
Dialysis  [] N/A   [] Asthma/COPD [] Dementia [] Other:   [] Stroke [] Sleep apnea [] Other:   [] Vascular disease [] Rheumatic disease [] Other:     Tests: [] X-Ray: [] MRI:  [] Other:    Medications: [x] Refer to full medical record [] None [] Other:  Allergies:      [x] Refer to full medical record [] None [] Other:    Function:  Hand Dominance  [] Right  [] Left  Patient lives with:    In what type of home []  One story   [] Two story   [] Split level   Number of stairs to enter    With handrail on the []  Right to enter   [] Left to enter   Bathroom has a []  Tub only  [] Tub/shower combo   [] Walk in shower    []  Grab bars   Washing machine is on []  Main level   [] Second level   [] Basement   Employer    Job Status []  Normal duty   [] Light duty   [] Off due to condition    []  Retired   [] Not employed   [] Disability  [] Other:  []  Return to work:   Work activities/duties        ADL/IADL [] Previously independent with all [] Currently independent with all Who currently assists the patient with task    [] Previously independent with all except: [] Currently independent with all except:    Bathing  [] Assist [] Assist    Dress/grooming [] Assist [] Assist    Transfer/mobility [] Assist [] Assist    Feeding [] Assist [] Assist    Toileting [] Assist [] Assist    Driving [] Assist [] Assist    Housekeeping [] Assist [] Assist    Grocery shop/meal prep [] Assist [] Assist      Gait Prior level of function Current level of function    [] Independent  [] Assist [] Independent  [] Assist   Device: [] Independent [] Independent    [] Straight Cane [] Quad cane [] Straight Cane [] Quad cane    [] Standard walker [] Rolling walker   [] 4 wheeled walker [] Standard walker [] Rolling walker   [] 4 wheeled walker    [] Wheelchair [] Wheelchair     Pain:  [] Yes  [] No Location: *** Pain Rating: (0-10 scale) ***/10  Pain altered Tx:  [] Yes  [] No  Action:    Symptoms:  [] Improving [] Worsening [] Same  Better:  []

## 2025-03-20 ENCOUNTER — HOSPITAL ENCOUNTER (OUTPATIENT)
Dept: PHYSICAL THERAPY | Facility: CLINIC | Age: 45
Setting detail: THERAPIES SERIES
Discharge: HOME | End: 2025-03-20
Attending: PAIN MEDICINE
Payer: COMMERCIAL

## 2025-03-20 PROCEDURE — 97113 AQUATIC THERAPY/EXERCISES: CPT

## 2025-03-20 NOTE — FLOWSHEET NOTE
[x] [facility]  [address]  P: [phone]  F: [phone]     Physical Therapy Daily  Aquatic Treatment Note    Date:  3/20/2025  Patient Name:  Xander Bell    :  1980  MRN: [id number]  Physician: Zehra Rain MD                                   Insurance: [employer] Dual; Yoan yr; vs based on med nec; auth after eval; no pt resp   Medical Diagnosis: Complex regional pain syndrome type 2 of left lower extremity [G57.72], Other chronic pain [G89.29]   Rehab Codes: M79.605, R20.2  Onset date: 2020                       Next Dr's appt.: 2025 (neurosurgeon)    Visit# / total visits:   Cancels/No Shows: 0/0    Subjective:    Pain:  [x] Yes  [] No Location: distal L LE Pain Rating: (0-10 scale) 7/10  Pain altered Tx:  [x] No  [] Yes  Action:  Comments: Pt arrives to initiate aquatic therapy with elevated c/o L LE pain. Notes most pain is from knee down, but buttock is also painful.     Objective:     KEY  B = Belt G = Gloves N = Noodle   C = Cuffs K = Kickboard P = Paddles   CC = Cervical Collar L = Laps T = Theratube   DB = Dumbells M = Minutes W = Weights     Exercises/Activities  Warm-up/Amb 3/20 Dynamic Exercises 3/20   Forward 3L noodle March    Sideways 3L noodle Squat    Backwards 3L noodle Retro HS curls      Retro SLR    Stretches  Braiding    Gastroc/Soleus 3x30\" Heel to Toe amb    Hamstring 3x30\" at wall Toe amb    Hip flexor  Heel amb    Piriformis      SKTC      Pec Stretch      Post Deltoid  Static Exercises UE      Shoulder flex/ext 15   Static Exercises LE  Shoulder abd/add 15   Heel/toe raises 15 Shoulder H.  abd/add 15    15 Shoulder IR/ER    Mini-squats 15 Rowing    4-way hip  15 Arm Circles    Hamstring curls 15 UT shrugs/rolls    Hip Circles/Fig 8  Scap squeezes    Ankle ROM  Diagonals 1/2    Lunges   Elbow flex/ext      Pron/Sup    Functional Exercise  Wrist AROM    Step      Wall

## 2025-03-27 ENCOUNTER — HOSPITAL ENCOUNTER (OUTPATIENT)
Dept: PHYSICAL THERAPY | Facility: CLINIC | Age: 45
Setting detail: THERAPIES SERIES
Discharge: HOME | End: 2025-03-27
Attending: PAIN MEDICINE
Payer: COMMERCIAL

## 2025-03-27 PROCEDURE — 97113 AQUATIC THERAPY/EXERCISES: CPT

## 2025-03-27 NOTE — FLOWSHEET NOTE
[x] [facility]  [address]  P: [phone]  F: [phone]     Physical Therapy Daily  Aquatic Treatment Note    Date:  3/27/2025  Patient Name:  Xander Bell    :  1980  MRN: [id number]  Physician: Zehra Rain MD                                   Insurance: [employer] Dual; Yoan yr; vs based on med nec; auth after eval; no pt resp   Medical Diagnosis: Complex regional pain syndrome type 2 of left lower extremity [G57.72], Other chronic pain [G89.29]   Rehab Codes: M79.605, R20.2  Onset date: 2020                       Next Dr's appt.: 2025 (neurosurgeon)    Visit# / total visits: 216  Cancels/No Shows: 0/0    Subjective:    Pain:  [x] Yes  [] No Location: distal L LE Pain Rating: (0-10 scale) 6/10  Pain altered Tx:  [x] No  [] Yes  Action:  Comments: Pt reports he felt really good after his last pool appt. Some fatigue post, and some heaviness in LE's but tolerable.     Objective:     KEY  B = Belt G = Gloves N = Noodle   C = Cuffs K = Kickboard P = Paddles   CC = Cervical Collar L = Laps T = Theratube   DB = Dumbells M = Minutes W = Weights     Exercises/Activities  Warm-up/Amb 3/27 Dynamic Exercises 3/27   Forward 3L noodle March    Sideways 3L noodle Squat    Backwards 3L noodle Retro HS curls      Retro SLR    Stretches  Braiding    Gastroc/Soleus 3x30\" Heel to Toe amb    Hamstring 3x30\" at wall Toe amb    Hip flexor  Heel amb    Piriformis      SKTC      Pec Stretch      Post Deltoid  Static Exercises UE      Shoulder flex/ext 20   Static Exercises LE  Shoulder abd/add 20   Heel/toe raises 20 Shoulder H.  abd/add 20   Marches 20 Shoulder IR/ER    Mini-squats 15 Rowing    4-way hip  15 Arm Circles    Hamstring curls 20 UT shrugs/rolls    Hip Circles/Fig 8  Scap squeezes    Ankle ROM  Diagonals 1/2    Lunges   Elbow flex/ext      Pron/Sup    Functional Exercise  Wrist AROM    Step      Wall Push-ups  Deep

## 2025-04-03 ENCOUNTER — HOSPITAL ENCOUNTER (OUTPATIENT)
Dept: PHYSICAL THERAPY | Facility: CLINIC | Age: 45
Setting detail: THERAPIES SERIES
Discharge: HOME | End: 2025-04-03
Attending: PAIN MEDICINE
Payer: COMMERCIAL

## 2025-04-03 PROCEDURE — 97113 AQUATIC THERAPY/EXERCISES: CPT

## 2025-04-03 NOTE — FLOWSHEET NOTE
[x] [facility]  [address]  P: [phone]  F: [phone]     Physical Therapy Daily  Aquatic Treatment Note    Date:  4/3/2025  Patient Name:  Xander Bell    :  1980  MRN: [id number]  Physician: Zehra Rain MD                                   Insurance: [employer] Dual; Yoan yr; vs based on med nec; auth after eval; no pt resp   Medical Diagnosis: Complex regional pain syndrome type 2 of left lower extremity [G57.72], Other chronic pain [G89.29]   Rehab Codes: M79.605, R20.2  Onset date: 2020                       Next Dr's appt.: 2025 (neurosurgeon)    Visit# / total visits:   Cancels/No Shows: 0/0    Subjective:    Pain:  [x] Yes  [] No Location: distal L LE Pain Rating: (0-10 scale) 7/10  Pain altered Tx:  [x] No  [] Yes  Action:  Comments: Pt reports he felt \"heavy\" after last session. Notes feeling about the same \"pain is always a 7\".     Objective:     KEY  B = Belt G = Gloves N = Noodle   C = Cuffs K = Kickboard P = Paddles   CC = Cervical Collar L = Laps T = Theratube   DB = Dumbells M = Minutes W = Weights     Exercises/Activities  Warm-up/Amb 4/3 Dynamic Exercises 4/3   Forward 3L  March 3L   Sideways 3L  Squat    Backwards 3L  Retro HS curls      Retro SLR    Stretches  Braiding    Gastroc/Soleus 3x30\" Heel to Toe amb    Hamstring 3x30\" at wall Toe amb    Hip flexor  Heel amb    Piriformis      SKTC      Pec Stretch      Post Deltoid  Static Exercises UE      Shoulder flex/ext 25   Static Exercises LE  Shoulder abd/add 25   Heel/toe raises 25 Shoulder H.  abd/add 25   March 25 Shoulder IR/ER    Mini-squats 25 Rowing    4-way hip  25 Arm Circles    Hamstring curls 25 UT shrugs/rolls    Hip Circles/Fig 8  Scap squeezes    Ankle ROM  Diagonals 1/2    Lunges   Elbow flex/ext      Pron/Sup    Functional Exercise  Wrist AROM    Step      Wall Push-ups  Deep H20/    SLS  Bike 2m   Breast Stroke on

## 2025-04-10 ENCOUNTER — HOSPITAL ENCOUNTER (OUTPATIENT)
Dept: PHYSICAL THERAPY | Facility: CLINIC | Age: 45
Setting detail: THERAPIES SERIES
Discharge: HOME | End: 2025-04-10
Attending: PAIN MEDICINE
Payer: COMMERCIAL

## 2025-04-10 PROCEDURE — 97113 AQUATIC THERAPY/EXERCISES: CPT

## 2025-04-10 NOTE — FLOWSHEET NOTE
[x] [facility]  [address]  P: [phone]  F: [phone]     Physical Therapy Daily  Aquatic Treatment Note    Date:  4/10/2025  Patient Name:  Xander Bell    :  1980  MRN: [id number]  Physician: Zehra Rain MD                                   Insurance: [employer] Dual; Yoan yr; vs based on med nec; auth after eval; no pt resp   Medical Diagnosis: Complex regional pain syndrome type 2 of left lower extremity [G57.72], Other chronic pain [G89.29]   Rehab Codes: M79.605, R20.2  Onset date: 2020                       Next Dr's appt.: 2025 (neurosurgeon)    Visit# / total visits:   Cancels/No Shows: 0/0    Subjective:    Pain:  [x] Yes  [] No Location: distal L LE Pain Rating: (0-10 scale) 6/10  Pain altered Tx:  [x] No  [] Yes  Action:  Comments: Pt states he is having a pretty good day today, trying to \"maintain\".     Objective:     KEY  B = Belt G = Gloves N = Noodle   C = Cuffs K = Kickboard P = Paddles   CC = Cervical Collar L = Laps T = Theratube   DB = Dumbells M = Minutes W = Weights     Exercises/Activities  Warm-up/Amb 4/10 Dynamic Exercises 4/10   Forward 3L  March 3L   Sideways 3L  Squat    Backwards 3L  Retro HS curls      Retro SLR    Stretches  Braiding    Gastroc/Soleus 3x30\" Heel to Toe amb    Hamstring 3x30\" at wall Toe amb    Hip flexor  Heel amb    Piriformis      SKTC      Pec Stretch      Post Deltoid  Static Exercises UE gloves     Shoulder flex/ext 30   Static Exercises LE  Shoulder abd/add 30   Heel/toe raises 30 Shoulder H.  abd/add 30   March 30 Shoulder IR/ER    Mini-squats 30 Rowing    4-way hip  30 Arm Circles    Hamstring curls 30 UT shrugs/rolls    Hip Circles/Fig 8  Scap squeezes    Ankle ROM  Diagonals 1/2    Lunges   Elbow flex/ext      Pron/Sup    Functional Exercise  Wrist AROM    Step      Wall Push-ups  Deep H20/    SLS  Bike 2m   Breast Stroke on Noodle  Hip abd/add

## 2025-04-15 SDOH — HEALTH STABILITY: PHYSICAL HEALTH: ON AVERAGE, HOW MANY MINUTES DO YOU ENGAGE IN EXERCISE AT THIS LEVEL?: 30 MIN

## 2025-04-15 SDOH — HEALTH STABILITY: PHYSICAL HEALTH: ON AVERAGE, HOW MANY DAYS PER WEEK DO YOU ENGAGE IN MODERATE TO STRENUOUS EXERCISE (LIKE A BRISK WALK)?: 3 DAYS

## 2025-04-15 ASSESSMENT — LIFESTYLE VARIABLES
HAS A RELATIVE, FRIEND, DOCTOR, OR ANOTHER HEALTH PROFESSIONAL EXPRESSED CONCERN ABOUT YOUR DRINKING OR SUGGESTED YOU CUT DOWN: NO
HOW OFTEN DURING THE LAST YEAR HAVE YOU NEEDED AN ALCOHOLIC DRINK FIRST THING IN THE MORNING TO GET YOURSELF GOING AFTER A NIGHT OF HEAVY DRINKING: NEVER
HOW OFTEN DURING THE LAST YEAR HAVE YOU HAD A FEELING OF GUILT OR REMORSE AFTER DRINKING: NEVER
HOW OFTEN DO YOU HAVE A DRINK CONTAINING ALCOHOL: MONTHLY OR LESS
HOW MANY STANDARD DRINKS CONTAINING ALCOHOL DO YOU HAVE ON A TYPICAL DAY: 1
HOW OFTEN DURING THE LAST YEAR HAVE YOU FOUND THAT YOU WERE NOT ABLE TO STOP DRINKING ONCE YOU HAD STARTED: NEVER
HOW OFTEN DO YOU HAVE SIX OR MORE DRINKS ON ONE OCCASION: 2
HOW OFTEN DURING THE LAST YEAR HAVE YOU NEEDED AN ALCOHOLIC DRINK FIRST THING IN THE MORNING TO GET YOURSELF GOING AFTER A NIGHT OF HEAVY DRINKING: NEVER
HAVE YOU OR SOMEONE ELSE BEEN INJURED AS A RESULT OF YOUR DRINKING: NO
HOW OFTEN DURING THE LAST YEAR HAVE YOU BEEN UNABLE TO REMEMBER WHAT HAPPENED THE NIGHT BEFORE BECAUSE YOU HAD BEEN DRINKING: NEVER
HOW MANY STANDARD DRINKS CONTAINING ALCOHOL DO YOU HAVE ON A TYPICAL DAY: 1 OR 2
HOW OFTEN DURING THE LAST YEAR HAVE YOU FAILED TO DO WHAT WAS NORMALLY EXPECTED FROM YOU BECAUSE OF DRINKING: NEVER
HOW OFTEN DURING THE LAST YEAR HAVE YOU HAD A FEELING OF GUILT OR REMORSE AFTER DRINKING: NEVER
HOW OFTEN DO YOU HAVE A DRINK CONTAINING ALCOHOL: 2
HAS A RELATIVE, FRIEND, DOCTOR, OR ANOTHER HEALTH PROFESSIONAL EXPRESSED CONCERN ABOUT YOUR DRINKING OR SUGGESTED YOU CUT DOWN: NO
HOW OFTEN DURING THE LAST YEAR HAVE YOU BEEN UNABLE TO REMEMBER WHAT HAPPENED THE NIGHT BEFORE BECAUSE YOU HAD BEEN DRINKING: NEVER
HOW OFTEN DURING THE LAST YEAR HAVE YOU FAILED TO DO WHAT WAS NORMALLY EXPECTED FROM YOU BECAUSE OF DRINKING: NEVER
HOW OFTEN DURING THE LAST YEAR HAVE YOU FOUND THAT YOU WERE NOT ABLE TO STOP DRINKING ONCE YOU HAD STARTED: NEVER
HAVE YOU OR SOMEONE ELSE BEEN INJURED AS A RESULT OF YOUR DRINKING: NO

## 2025-04-15 ASSESSMENT — PATIENT HEALTH QUESTIONNAIRE - PHQ9
SUM OF ALL RESPONSES TO PHQ QUESTIONS 1-9: 1
1. LITTLE INTEREST OR PLEASURE IN DOING THINGS: SEVERAL DAYS
SUM OF ALL RESPONSES TO PHQ QUESTIONS 1-9: 1
2. FEELING DOWN, DEPRESSED OR HOPELESS: NOT AT ALL

## 2025-04-16 ENCOUNTER — OFFICE VISIT (OUTPATIENT)
Dept: FAMILY MEDICINE CLINIC | Age: 45
End: 2025-04-16
Payer: COMMERCIAL

## 2025-04-16 VITALS
SYSTOLIC BLOOD PRESSURE: 118 MMHG | RESPIRATION RATE: 16 BRPM | OXYGEN SATURATION: 99 % | TEMPERATURE: 98.4 F | HEIGHT: 71 IN | BODY MASS INDEX: 36.79 KG/M2 | DIASTOLIC BLOOD PRESSURE: 80 MMHG | WEIGHT: 262.8 LBS | HEART RATE: 126 BPM

## 2025-04-16 DIAGNOSIS — Z00.00 MEDICARE ANNUAL WELLNESS VISIT, SUBSEQUENT: Primary | ICD-10-CM

## 2025-04-16 DIAGNOSIS — K21.00 GASTROESOPHAGEAL REFLUX DISEASE WITH ESOPHAGITIS WITHOUT HEMORRHAGE: ICD-10-CM

## 2025-04-16 PROCEDURE — G0439 PPPS, SUBSEQ VISIT: HCPCS

## 2025-04-16 RX ORDER — FAMOTIDINE 20 MG/1
20 TABLET, FILM COATED ORAL NIGHTLY PRN
Qty: 90 TABLET | Refills: 1 | Status: SHIPPED | OUTPATIENT
Start: 2025-04-16 | End: 2026-04-16

## 2025-04-16 ASSESSMENT — PATIENT HEALTH QUESTIONNAIRE - PHQ9
SUM OF ALL RESPONSES TO PHQ QUESTIONS 1-9: 0
7. TROUBLE CONCENTRATING ON THINGS, SUCH AS READING THE NEWSPAPER OR WATCHING TELEVISION: NOT AT ALL
SUM OF ALL RESPONSES TO PHQ QUESTIONS 1-9: 0
9. THOUGHTS THAT YOU WOULD BE BETTER OFF DEAD, OR OF HURTING YOURSELF: NOT AT ALL
2. FEELING DOWN, DEPRESSED OR HOPELESS: NOT AT ALL
6. FEELING BAD ABOUT YOURSELF - OR THAT YOU ARE A FAILURE OR HAVE LET YOURSELF OR YOUR FAMILY DOWN: NOT AT ALL
5. POOR APPETITE OR OVEREATING: NOT AT ALL
SUM OF ALL RESPONSES TO PHQ QUESTIONS 1-9: 0
10. IF YOU CHECKED OFF ANY PROBLEMS, HOW DIFFICULT HAVE THESE PROBLEMS MADE IT FOR YOU TO DO YOUR WORK, TAKE CARE OF THINGS AT HOME, OR GET ALONG WITH OTHER PEOPLE: NOT DIFFICULT AT ALL
3. TROUBLE FALLING OR STAYING ASLEEP: NOT AT ALL
1. LITTLE INTEREST OR PLEASURE IN DOING THINGS: NOT AT ALL
SUM OF ALL RESPONSES TO PHQ QUESTIONS 1-9: 0
4. FEELING TIRED OR HAVING LITTLE ENERGY: NOT AT ALL
8. MOVING OR SPEAKING SO SLOWLY THAT OTHER PEOPLE COULD HAVE NOTICED. OR THE OPPOSITE, BEING SO FIGETY OR RESTLESS THAT YOU HAVE BEEN MOVING AROUND A LOT MORE THAN USUAL: NOT AT ALL

## 2025-04-16 NOTE — PROGRESS NOTES
Medicare Annual Wellness Visit    Xander Bell is here for Medicare AWV    Assessment & Plan   Gastroesophageal reflux disease with esophagitis without hemorrhage  The following orders have not been finalized:  -     famotidine (PEPCID) 20 MG tablet    Encounter Diagnoses   Name Primary?    Gastroesophageal reflux disease with esophagitis without hemorrhage     Medicare annual wellness visit, subsequent Yes         No follow-ups on file.     Subjective       Patient's complete Health Risk Assessment and screening values have been reviewed and are found in Flowsheets. The following problems were reviewed today and where indicated follow up appointments were made and/or referrals ordered.    Positive Risk Factor Screenings with Interventions:    Fall Risk:  Do you feel unsteady or are you worried about falling? : (!) (Patient-Rptd) yes  2 or more falls in past year?: (!) (Patient-Rptd) yes  Fall with injury in past year?: (!) (Patient-Rptd) yes     Interventions:    Patient comments: uses cane and AFO, worries about left ankle, chronic pain, currently working with PT  Reviewed medications, home hazards, visual acuity, and co-morbidities that can increase risk for falls       Drug Use:   Substance and Sexual Activity   Drug Use Yes    Types: Marijuana (Weed)    Comment: daily       Interventions:  Patient declined any further intervention or treatment           Abnormal BMI (obese):  Body mass index is 36.65 kg/m². (!) Abnormal    Interventions:  Patient declines any further evaluation or treatment        Dentist Screen:  Have you seen the dentist within the past year?: (!) (Patient-Rptd) No    Intervention:  Advised to schedule with their dentist     Vision Screen:  Do you have difficulty driving, watching TV, or doing any of your daily activities because of your eyesight?: (Patient-Rptd) No  Have you had an eye exam within the past year?: (!) (Patient-Rptd) No    Interventions:   Patient encouraged to make

## 2025-04-24 ENCOUNTER — HOSPITAL ENCOUNTER (OUTPATIENT)
Dept: PHYSICAL THERAPY | Facility: CLINIC | Age: 45
Setting detail: THERAPIES SERIES
Discharge: HOME | End: 2025-04-24
Attending: PAIN MEDICINE
Payer: COMMERCIAL

## 2025-04-24 PROCEDURE — 97113 AQUATIC THERAPY/EXERCISES: CPT

## 2025-04-24 NOTE — FLOWSHEET NOTE
[x] [facility] Outpatient Rehabilitation & Therapy  [address]  P: [phone]  F: [phone]     Physical Therapy Daily  Aquatic Treatment Note    Date:  2025  Patient Name:  Xander Bell    :  1980  MRN: [id number]  Physician: Zehra Rain MD                                   Insurance: [employer] Dual; Yoan yr; vs based on med nec; auth after eval; no pt resp   Medical Diagnosis: Complex regional pain syndrome type 2 of left lower extremity [G57.72], Other chronic pain [G89.29]   Rehab Codes: M79.605, R20.2  Onset date: 2020                       Next 's appt.: 2025 (neurosurgeon)    Visit# / total visits:   Cancels/No Shows: 0/0    Subjective:    Pain:  [x] Yes  [] No Location: distal L LE Pain Rating: (0-10 scale) 610  Pain altered Tx:  [x] No  [] Yes  Action:  Comments: Pt states pain is always a 6     Objective:     KEY  B = Belt G = Gloves N = Noodle   C = Cuffs K = Kickboard P = Paddles   CC = Cervical Collar L = Laps T = Theratube   DB = Dumbells M = Minutes W = Weights     Exercises/Activities  Warm-up/Amb  Dynamic Exercises    Forward 3L  March 3L   Sideways 3L  Squat    Backwards 3L  Retro HS curls      Retro SLR    Stretches  Braiding    Gastroc/Soleus 3x30\" Heel to Toe amb    Hamstring 3x30\" at wall Toe amb    Hip flexor  Heel amb    Piriformis      SKTC      Pec Stretch      Post Deltoid  Static Exercises UE gloves     Shoulder flex/ext 30   Static Exercises LE  Shoulder abd/add 30   Heel/toe raises 30 Shoulder H.  abd/add 30   Marches 30 Shoulder IR/ER    Mini-squats 30 Rowing    4-way hip  30 Arm Circles    Hamstring curls 30 UT shrugs/rolls    Hip Circles/Fig 8  Scap squeezes    Ankle ROM  Diagonals 1/2    Lunges   Elbow flex/ext      Pron/Sup    Functional Exercise  Wrist AROM    Step      Wall Push-ups  Deep H20/    SLS  Bike 3m   Breast Stroke on Noodle  Hip abd/add 3m   Noodle Twist  Hip flex/ext 3m

## 2025-05-02 DIAGNOSIS — R00.0 TACHYCARDIA: ICD-10-CM

## 2025-05-02 DIAGNOSIS — K21.00 GASTROESOPHAGEAL REFLUX DISEASE WITH ESOPHAGITIS WITHOUT HEMORRHAGE: ICD-10-CM

## 2025-05-05 RX ORDER — OMEPRAZOLE 20 MG/1
20 CAPSULE, DELAYED RELEASE ORAL
Qty: 90 CAPSULE | Refills: 1 | Status: SHIPPED | OUTPATIENT
Start: 2025-05-05

## 2025-05-05 RX ORDER — METOPROLOL SUCCINATE 25 MG/1
25 TABLET, EXTENDED RELEASE ORAL DAILY
Qty: 90 TABLET | Refills: 1 | Status: SHIPPED | OUTPATIENT
Start: 2025-05-05

## 2025-05-05 NOTE — TELEPHONE ENCOUNTER
[address] [facility]- Patient LM on anserve returning call   C/b [phone] Routing to pcp on file

## 2025-05-08 ENCOUNTER — HOSPITAL ENCOUNTER (OUTPATIENT)
Dept: PHYSICAL THERAPY | Facility: CLINIC | Age: 45
Setting detail: THERAPIES SERIES
Discharge: HOME | End: 2025-05-08
Attending: PAIN MEDICINE
Payer: COMMERCIAL

## 2025-05-08 PROCEDURE — 97113 AQUATIC THERAPY/EXERCISES: CPT

## 2025-05-08 NOTE — FLOWSHEET NOTE
[x] [facility] Outpatient Rehabilitation & Therapy  [address]  P: [phone]  F: [phone]     Physical Therapy Daily  Aquatic Treatment Note    Date:  2025  Patient Name:  Xander Bell    :  1980  MRN: [id number]  Physician: Zehra Rain MD                                   Insurance: [employer] Dual; Yoan yr; vs based on med nec; auth after eval; no pt resp   Medical Diagnosis: Complex regional pain syndrome type 2 of left lower extremity [G57.72], Other chronic pain [G89.29]   Rehab Codes: M79.605, R20.2  Onset date: 2020                       Next Dr's appt.: 2025 (neurosurgeon)    Visit# / total visits:   Cancels/No Shows: 0/0    Subjective:  Patient states he has been noticing overall improvements since starting aquatic therapy. Patient states he pushed it last session when in the pool so he may not go as strong today.   Pain:  [x] Yes  [] No Location: distal L LE Pain Rating: (0-10 scale) 6/10  Pain altered Tx:  [x] No  [] Yes  Action:  Comments:     Objective:     KEY  B = Belt G = Gloves N = Noodle   C = Cuffs K = Kickboard P = Paddles   CC = Cervical Collar L = Laps T = Theratube   DB = Dumbells M = Minutes W = Weights     Exercises/Activities  Warm-up/Amb 5/8 Dynamic Exercises 5/8   Forward 3L  March 3L   Sideways 3L  Squat    Backwards 3L  Retro HS curls      Retro SLR    Stretches  Braiding    Gastroc/Soleus 3x30\" Heel to Toe amb    Hamstring 3x30\" at wall Toe amb    Hip flexor  Heel amb    Piriformis      SKTC      Pec Stretch      Post Deltoid  Static Exercises UE gloves     Shoulder flex/ext 30   Static Exercises LE  Shoulder abd/add 30   Heel/toe raises 25 Shoulder H.  abd/add 30   Marches 25 Shoulder IR/ER    Mini-squats 25 Rowing    4-way hip  25 Arm Circles    Hamstring curls 25 UT shrugs/rolls    Hip Circles/Fig 8  Scap squeezes    Ankle ROM  Diagonals 1/2    Lunges   Elbow flex/ext      Pron/Sup    Functional

## 2025-05-15 ENCOUNTER — HOSPITAL ENCOUNTER (OUTPATIENT)
Dept: PHYSICAL THERAPY | Facility: CLINIC | Age: 45
Setting detail: THERAPIES SERIES
Discharge: HOME | End: 2025-05-15
Attending: PAIN MEDICINE
Payer: COMMERCIAL

## 2025-05-15 NOTE — FLOWSHEET NOTE
[] [facility]  Outpatient Rehabilitation &  Therapy  [address]  [phone]  [phone] [] [facility]  Outpatient Rehabilitation &  Therapy  [address]  P: [phone]  F: [phone] [x] [facility]  Outpatient Rehabilitation &  Therapy  [address]  P: [phone]  F: [phone] [] [facility]  Outpatient Rehabilitation &  Therapy  [address]  [phone]  [phone] [] [facility]  Outpatient Rehabilitation &  Therapy  [address]   P: [phone]  F: [phone]  [] [facility]  Outpatient Rehabilitation &  Therapy  [address]  P: [phone]  F: [phone] [] [facility]  Outpatient Rehabilitation &  Therapy  [address]  P: [phone]  F: [phone] [] [facility]  Outpatient Rehabilitation &  Therapy  [address]  P: [phone]  F: [phone] [] [facility]  Outpatient Rehabilitation &  Therapy  [address]  P: [phone]  F: [phone]  [] [facility] Outpatient Rehabilitation &  Therapy  [address]  P: [phone]  F: [phone]     Therapy Cancel/No Show note    Date: 5/15/2025  Patient: Xander Bell  : 1980  MRN: [id number]    Cancels/No Shows to date: 0    For today's appointment patient:    [x]  Cancelled    [] Rescheduled appointment    [] No-show     Reason given by patient:    []  Patient ill    []  Conflicting appointment    [] No transportation      [] Conflict with work    [x] No reason given    [] Weather related    [] COVID-19    [] Other:      Comments:        [x] Next appointment was confirmed    Electronically signed by: Radha Lyons, PTA

## 2025-05-22 ENCOUNTER — HOSPITAL ENCOUNTER (OUTPATIENT)
Dept: PHYSICAL THERAPY | Facility: CLINIC | Age: 45
Setting detail: THERAPIES SERIES
Discharge: HOME | End: 2025-05-22
Attending: PAIN MEDICINE
Payer: COMMERCIAL

## 2025-05-22 PROCEDURE — 97113 AQUATIC THERAPY/EXERCISES: CPT

## 2025-05-22 NOTE — FLOWSHEET NOTE
[x] [facility] Outpatient Rehabilitation & Therapy  [address]  P: [phone]  F: [phone]     Physical Therapy Daily  Aquatic Treatment Note    Date:  2025  Patient Name:  Xander Bell    :  1980  MRN: [id number]  Physician: Zehra Rain MD                                   Insurance: [employer] Dual; Yoan yr; vs based on med nec; auth after eval; no pt resp   Medical Diagnosis: Complex regional pain syndrome type 2 of left lower extremity [G57.72], Other chronic pain [G89.29]   Rehab Codes: M79.605, R20.2  Onset date: 2020                       Next Dr's appt.: 2025 (neurosurgeon)    Visit# / total visits:   Cancels/No Shows: 1/0    Subjective:  Pt notes he notices some improvements, but reporting the usual pain.    Pain:  [x] Yes  [] No Location: distal L LE Pain Rating: (0-10 scale) 6/10  Pain altered Tx:  [x] No  [] Yes  Action:  Comments:     Objective:     KEY  B = Belt G = Gloves N = Noodle   C = Cuffs K = Kickboard P = Paddles   CC = Cervical Collar L = Laps T = Theratube   DB = Dumbells M = Minutes W = Weights     Exercises/Activities  Warm-up/Amb  Dynamic Exercises    Forward 4L  March 4L   Sideways 4L  Squat    Backwards 4L  Retro HS curls      Retro SLR    Stretches  Braiding    Gastroc/Soleus 3x30\" Heel to Toe amb    Hamstring 3x30\" at wall Toe amb    Hip flexor  Heel amb    Piriformis      SKTC      Pec Stretch      Post Deltoid  Static Exercises UE gloves     Shoulder flex/ext 30   Static Exercises LE  Shoulder abd/add 30   Heel/toe raises 30 Shoulder H.  abd/add 30   Marches 30 Shoulder IR/ER    Mini-squats 30 Rowing    4-way hip  30 Arm Circles    Hamstring curls 30 UT shrugs/rolls    Hip Circles/Fig 8  Scap squeezes    Ankle ROM  Diagonals 1/2    Lunges   Elbow flex/ext      Pron/Sup    Functional Exercise  Wrist AROM    Step 10     Wall Push-ups  Deep H20/    SLS  Bike 3m   Breast Stroke on Noodle  Hip

## 2025-05-29 ENCOUNTER — HOSPITAL ENCOUNTER (OUTPATIENT)
Dept: PHYSICAL THERAPY | Facility: CLINIC | Age: 45
Setting detail: THERAPIES SERIES
Discharge: HOME | End: 2025-05-29
Attending: PAIN MEDICINE
Payer: COMMERCIAL

## 2025-05-29 PROCEDURE — 97113 AQUATIC THERAPY/EXERCISES: CPT

## 2025-05-29 NOTE — FLOWSHEET NOTE
[x] [facility] Outpatient Rehabilitation & Therapy  [address]  P: [phone]  F: [phone]     Physical Therapy Daily  Aquatic Treatment Note    Date:  2025  Patient Name:  Xander Bell    :  1980  MRN: [id number]  Physician: Zehra Rain MD                                   Insurance: [employer] Dual; Yoan yr; vs based on med nec; auth after eval; no pt resp   Medical Diagnosis: Complex regional pain syndrome type 2 of left lower extremity [G57.72], Other chronic pain [G89.29]   Rehab Codes: M79.605, R20.2  Onset date: 2020                       Next Dr's appt.: 2025 (neurosurgeon)    Visit# / total visits:   Cancels/No Shows: 1/0    Subjective:  Pt reporting the usual pain/soreness.     Pain:  [x] Yes  [] No Location: distal L LE Pain Rating: (0-10 scale) 5/10  Pain altered Tx:  [x] No  [] Yes  Action:  Comments:     Objective:     KEY  B = Belt G = Gloves N = Noodle   C = Cuffs K = Kickboard P = Paddles   CC = Cervical Collar L = Laps T = Theratube   DB = Dumbells M = Minutes W = Weights     Exercises/Activities  Warm-up/Amb  Dynamic Exercises    Forward 4L  March 4L   Sideways 4L  Squat    Backwards 4L  Retro HS curls      Retro SLR    Stretches  Braiding    Gastroc/Soleus 3x30\" Heel to Toe amb    Hamstring 3x30\" at wall Toe amb    Hip flexor  Heel amb    Piriformis      SKTC      Pec Stretch      Post Deltoid  Static Exercises UE gloves     Shoulder flex/ext 30   Static Exercises LE  Shoulder abd/add 30   Heel/toe raises 30 Shoulder H.  abd/add 30   Marches 30 Shoulder IR/ER    Mini-squats 30 Rowing    4-way hip  30 Arm Circles    Hamstring curls 30 UT shrugs/rolls    Hip Circles/Fig 8  Scap squeezes    Ankle ROM  Diagonals 1/2    Lunges   Elbow flex/ext      Pron/Sup    Functional Exercise  Wrist AROM    Step 30     Wall Push-ups  Deep H20/    SLS  Bike 3m   Breast Stroke on Noodle  Hip abd/add 3m   Noodle Twist  Hip

## 2025-06-05 ENCOUNTER — HOSPITAL ENCOUNTER (OUTPATIENT)
Dept: PHYSICAL THERAPY | Facility: CLINIC | Age: 45
Setting detail: THERAPIES SERIES
Discharge: HOME | End: 2025-06-05
Attending: PAIN MEDICINE
Payer: COMMERCIAL

## 2025-06-05 ENCOUNTER — OFFICE VISIT (OUTPATIENT)
Dept: PAIN MANAGEMENT | Age: 45
End: 2025-06-05
Payer: COMMERCIAL

## 2025-06-05 VITALS — WEIGHT: 262 LBS | HEIGHT: 71 IN | BODY MASS INDEX: 36.68 KG/M2

## 2025-06-05 DIAGNOSIS — G57.72 COMPLEX REGIONAL PAIN SYNDROME TYPE 2 OF LEFT LOWER EXTREMITY: ICD-10-CM

## 2025-06-05 DIAGNOSIS — G57.32 PERONEAL NEUROPATHY AT KNEE, LEFT: ICD-10-CM

## 2025-06-05 DIAGNOSIS — G89.29 OTHER CHRONIC PAIN: ICD-10-CM

## 2025-06-05 DIAGNOSIS — G90.522 COMPLEX REGIONAL PAIN SYNDROME I OF LEFT LOWER LIMB: ICD-10-CM

## 2025-06-05 DIAGNOSIS — M79.2 NEUROPATHIC PAIN: Primary | ICD-10-CM

## 2025-06-05 PROCEDURE — 97110 THERAPEUTIC EXERCISES: CPT

## 2025-06-05 PROCEDURE — 99213 OFFICE O/P EST LOW 20 MIN: CPT | Performed by: NURSE PRACTITIONER

## 2025-06-05 RX ORDER — GABAPENTIN 600 MG/1
600 TABLET ORAL 3 TIMES DAILY
Qty: 90 TABLET | Refills: 2 | Status: SHIPPED | OUTPATIENT
Start: 2025-06-05 | End: 2025-09-03

## 2025-06-05 RX ORDER — CYCLOBENZAPRINE HCL 10 MG
10 TABLET ORAL 2 TIMES DAILY PRN
Qty: 60 TABLET | Refills: 2 | Status: SHIPPED | OUTPATIENT
Start: 2025-06-05 | End: 2025-09-03

## 2025-06-05 ASSESSMENT — ENCOUNTER SYMPTOMS
SHORTNESS OF BREATH: 0
CONSTIPATION: 0
COUGH: 0
BACK PAIN: 1

## 2025-06-05 NOTE — FLOWSHEET NOTE
[] [facility]  Outpatient Rehabilitation &  Therapy  [address]  [phone]  [phone] [] [facility]  Outpatient Rehabilitation &  Therapy  [address]  P: [phone]  F: [phone] [] [facility]  Outpatient Rehabilitation &  Therapy  [address]  P: [phone]  F: [phone] [x] [facility]  Outpatient Rehabilitation &  Therapy  [address]  [phone]  [phone] [] [facility]  Outpatient Rehabilitation &  Therapy  [address]   P: [phone]  F: [phone]  [] [facility]  Outpatient Rehabilitation &  Therapy  [address]  P: [phone]  F: [phone] [] [facility]  Outpatient Rehabilitation &  Therapy  [address]  P: [phone]  F: [phone] [] [facility]  Outpatient Rehabilitation &  Therapy  [address]  P: [phone]  F: [phone] [] [facility]  Outpatient Rehabilitation &  Therapy  [address]  P: [phone]  F: [phone]  [] [facility] Outpatient Rehabilitation &  Therapy  [address]  P: [phone]  F: [phone]     Physical Therapy Daily Treatment Note    Date:  2025  Patient Name:  Xander Bell    :  1980  MRN: [id number]  Physician: Zehra Rain MD                                   Insurance: [employer] Dual; Yoan yr; vs based on med nec; auth after eval; no pt resp   Medical Diagnosis: Complex regional pain syndrome type 2 of left lower extremity [G57.72], Other chronic pain [G89.29]   Rehab Codes: M79.605, R20.2  Onset date: 2020                       Next 's appt.: 2025 (neurosurgeon)  Visit# / total visits: 10/16    Cancels/No Shows: 1/0    Subjective:    Pain:  [x] Yes  [] No Location: left leg  Pain Rating:

## 2025-06-05 NOTE — PROGRESS NOTES
Treatment Plan:  Continue gabapentin  Pt would like consultation with Dr. Leach for DRG  He has discontinued Eliquis   Follow up appointment made for 3 months    I have reviewed the chief complaint and history of present illness (including ROS and PFSH) and vital documentation by my staff and I agree with their documentation and have added where applicable.

## 2025-07-08 ENCOUNTER — OFFICE VISIT (OUTPATIENT)
Age: 45
End: 2025-07-08
Payer: COMMERCIAL

## 2025-07-08 VITALS — DIASTOLIC BLOOD PRESSURE: 64 MMHG | BODY MASS INDEX: 35.98 KG/M2 | SYSTOLIC BLOOD PRESSURE: 98 MMHG | WEIGHT: 258 LBS

## 2025-07-08 DIAGNOSIS — E55.9 VITAMIN D DEFICIENCY: ICD-10-CM

## 2025-07-08 DIAGNOSIS — R10.9 FLANK PAIN: ICD-10-CM

## 2025-07-08 DIAGNOSIS — M54.50 ACUTE LOW BACK PAIN, UNSPECIFIED BACK PAIN LATERALITY, UNSPECIFIED WHETHER SCIATICA PRESENT: Primary | ICD-10-CM

## 2025-07-08 PROCEDURE — 99213 OFFICE O/P EST LOW 20 MIN: CPT

## 2025-07-08 RX ORDER — MELOXICAM 7.5 MG/1
7.5 TABLET ORAL DAILY PRN
Qty: 90 TABLET | Refills: 1 | Status: SHIPPED | OUTPATIENT
Start: 2025-07-08

## 2025-07-08 NOTE — PROGRESS NOTES
Xander Bell (:  1980) is a 44 y.o. male,Established patient, here for evaluation of the following chief complaint(s):  Follow-Up from Hospital    Assessment & Plan  Diagnoses and all orders for this visit:  Acute low back pain, unspecified back pain laterality, unspecified whether sciatica present  -     Culture, Urine; Future  Flank pain  -     Culture, Urine; Future  Other orders  -     meloxicam (MOBIC) 7.5 MG tablet; Take 1 tablet by mouth daily as needed for Pain     1. Lower back pain  - Discomfort likely due to muscle spasms, possibly exacerbated by lumbar stenosis  - Physical exam reveals tenderness on the right side with muscle spasm; no fever, chills, or urinary symptoms  - Conduct urine culture to rule out infection  - Continue with physical therapy  - Take Flexeril 10 mg twice daily  - Mobic 15 mg once daily as needed    2. Lumbar stenosis  - History of lumbar stenosis with recent MRI showing right foraminal stenosis  - Physical exam consistent with muscle spasm; no recent lifting or heavy activity reported  - Continue with physical therapy to strengthen muscles and alleviate symptoms  - No additional imaging needed as MRI provides sufficient information    Follow-up  - Follow-up appointment scheduled for next week          No follow-ups on file.       Subjective   HPI  History of Present Illness  The patient presents for evaluation of lower back pain.    Lower Back Pain  - Severe, cramp-like stabbing pain in her right lower back, likened to kidney pain  - No history of kidney stones  - CT scan revealed no abnormalities in the kidney or bladder  - No fever, chills, or burning sensation during urination  - No recent heavy lifting  - Using a lidocaine patch for pain relief  - Prescribed Flexeril, taken twice daily  - Some relief from a compounded topical cream, but leaves a sticky residue  - Not currently taking any narcotics  - Tried ibuprofen and naproxen without success  - Has not yet

## 2025-07-09 RX ORDER — ACETAMINOPHEN 160 MG
2000 TABLET,DISINTEGRATING ORAL DAILY
Qty: 60 CAPSULE | Refills: 1 | Status: SHIPPED | OUTPATIENT
Start: 2025-07-09

## 2025-07-09 NOTE — TELEPHONE ENCOUNTER
Xander Bell is calling to request a refill on the following medication(s):    Medication Request:  Requested Prescriptions     Pending Prescriptions Disp Refills    VITAMIN D3 50 MCG (2000 UT) CAPS capsule [Pharmacy Med Name: VITAMIN D3 50 MCG SOFTGEL] 60 capsule 1     Sig: TAKE 1 CAPSULE BY MOUTH DAILY       Last Visit Date (If Applicable):  4/16/2025    Next Visit Date:    Visit date not found

## 2025-07-15 ENCOUNTER — OFFICE VISIT (OUTPATIENT)
Age: 45
End: 2025-07-15
Payer: COMMERCIAL

## 2025-07-15 VITALS
WEIGHT: 260.6 LBS | SYSTOLIC BLOOD PRESSURE: 101 MMHG | HEART RATE: 99 BPM | BODY MASS INDEX: 36.35 KG/M2 | DIASTOLIC BLOOD PRESSURE: 71 MMHG

## 2025-07-15 DIAGNOSIS — M62.838 MUSCLE SPASM: Primary | ICD-10-CM

## 2025-07-15 DIAGNOSIS — R10.9 FLANK PAIN: ICD-10-CM

## 2025-07-15 PROCEDURE — 99213 OFFICE O/P EST LOW 20 MIN: CPT

## 2025-07-15 NOTE — PROGRESS NOTES
Xander Bell (:  1980) is a 44 y.o. male,Established patient, here for evaluation of the following chief complaint(s):  Follow-up    Assessment & Plan  Diagnoses and all orders for this visit:  Muscle spasm  Flank pain    1. Flank pain:  - Pain has decreased from 10 to 7 on the pain scale over the past week.  - Currently using Mobic and Flexeril for pain management.  - Plans to resume physical therapy after the pain management appointment on 2025.  - Advised to continue using Mobic as needed and to rest as necessary.  - Contact clinic if pain worsens or changes.            No follow-ups on file.       Subjective   HPI  History of Present Illness    The patient presents for evaluation of flank pain.    Flank Pain  - He reports an improvement in his flank pain, which he rates as a 7 on a scale of 10, down from a 10 last week.  - He has not yet resumed physical therapy but plans to do so after his upcoming pain management appointment on 2025.  - He has been using Mobic and Flexeril for pain management, taking Mobic daily, including today.  - He also mentions that he has been resting for the past 4 days.    He is currently off blood thinners, specifically Eliquis, in preparation for a vascular consultation in [place].    He is uncertain if an ultrasound has been ordered.    He has resumed his vitamin D and gabapentin regimen.     Review of Systems       Objective   Physical Exam     Physical Exam    Gastrointestinal: Mild tenderness on palpation of the flank       On this date 2025 I have spent 25 minutes reviewing previous notes, test results and face to face with the patient discussing the diagnosis and importance of compliance with the treatment plan as well as documenting on the day of the visit.      An electronic signature was used to authenticate this note.    --ALICIA AMOR MD

## 2025-08-10 DIAGNOSIS — F33.1 MODERATE EPISODE OF RECURRENT MAJOR DEPRESSIVE DISORDER (HCC): ICD-10-CM

## 2025-08-10 DIAGNOSIS — G57.72 COMPLEX REGIONAL PAIN SYNDROME TYPE 2 OF LEFT LOWER EXTREMITY: ICD-10-CM

## 2025-08-11 RX ORDER — DULOXETIN HYDROCHLORIDE 60 MG/1
60 CAPSULE, DELAYED RELEASE ORAL DAILY
Qty: 90 CAPSULE | Refills: 1 | Status: SHIPPED | OUTPATIENT
Start: 2025-08-11 | End: 2026-08-11

## 2025-09-04 ENCOUNTER — OFFICE VISIT (OUTPATIENT)
Dept: PAIN MANAGEMENT | Age: 45
End: 2025-09-04
Payer: COMMERCIAL

## 2025-09-04 VITALS — BODY MASS INDEX: 36.48 KG/M2 | HEIGHT: 71 IN | WEIGHT: 260.58 LBS

## 2025-09-04 DIAGNOSIS — G57.72 COMPLEX REGIONAL PAIN SYNDROME TYPE 2 OF LEFT LOWER EXTREMITY: ICD-10-CM

## 2025-09-04 DIAGNOSIS — G89.29 OTHER CHRONIC PAIN: ICD-10-CM

## 2025-09-04 DIAGNOSIS — M79.2 NEUROPATHIC PAIN: Primary | ICD-10-CM

## 2025-09-04 PROCEDURE — 99213 OFFICE O/P EST LOW 20 MIN: CPT | Performed by: NURSE PRACTITIONER

## 2025-09-04 RX ORDER — CYCLOBENZAPRINE HCL 10 MG
10 TABLET ORAL 2 TIMES DAILY PRN
Qty: 60 TABLET | Refills: 2 | Status: SHIPPED | OUTPATIENT
Start: 2025-09-04 | End: 2025-12-03

## 2025-09-04 RX ORDER — GABAPENTIN 600 MG/1
600 TABLET ORAL 3 TIMES DAILY
Qty: 90 TABLET | Refills: 2 | Status: SHIPPED | OUTPATIENT
Start: 2025-09-04 | End: 2025-12-03

## 2025-09-04 ASSESSMENT — ENCOUNTER SYMPTOMS
SHORTNESS OF BREATH: 0
CONSTIPATION: 0
COUGH: 0

## (undated) DEVICE — STRAP,POSITIONING,KNEE/BODY,FOAM,4X60": Brand: MEDLINE

## (undated) DEVICE — PERRYSBURG ENDO PACK: Brand: MEDLINE INDUSTRIES, INC.

## (undated) DEVICE — GARMENT,MEDLINE,DVT,INT,CALF,MED, GEN2: Brand: MEDLINE

## (undated) DEVICE — PROVE COVER: Brand: UNBRANDED

## (undated) DEVICE — DRAPE,REIN 53X77,STERILE: Brand: MEDLINE

## (undated) DEVICE — Device: Brand: DEFENDO VALVE AND CONNECTOR KIT

## (undated) DEVICE — BANDAGE,GAUZE,BULKEE II,4.5"X4.1YD,STRL: Brand: MEDLINE

## (undated) DEVICE — SURGICAL SUCTION CONNECTING TUBE WITH MALE CONNECTOR AND SUCTION CLAMP, 2 FT. LONG (.6 M), 5 MM I.D.: Brand: CONMED

## (undated) DEVICE — ADAPTER CLEANING PORPOISE CLEANING

## (undated) DEVICE — DRAPE,UTILTY,TAPE,15X26, 4EA/PK: Brand: MEDLINE

## (undated) DEVICE — FORCEPS BX L240CM JAW DIA2.8MM L CAP W/ NDL MIC MESH TOOTH

## (undated) DEVICE — STOCKINETTE,DOUBLE PLY,4X48,STERILE: Brand: MEDLINE

## (undated) DEVICE — ELECTRODE PT RET AD L9FT HI MOIST COND ADH HYDRGEL CORDED

## (undated) DEVICE — BLADE CLP TAPR HD WET DRY CAPABILITY GTT IN CHARGING USE

## (undated) DEVICE — STRAP ARMBRD W1.5XL32IN FOAM STR YET SFT W/ HK AND LOOP

## (undated) DEVICE — DRESSING BORDERED ADH GZ UNIV GEN USE 5IN 4IN AND 2 1/2IN

## (undated) DEVICE — GAUZE,SPONGE,FLUFF,6"X6.75",STRL,5/TRAY: Brand: MEDLINE

## (undated) DEVICE — SEALANT SURG TISSEEL 10 ML FROZEN PRIMA

## (undated) DEVICE — ORTHO EXT PK

## (undated) DEVICE — SUTURE VICRYL SZ 3-0 L18IN ABSRB UD L26MM SH 1/2 CIR J864D

## (undated) DEVICE — DISPOSABLE IRRIGATION BIPOLAR CORD, M1000 TYPE: Brand: KIRWAN

## (undated) DEVICE — COVER,MAYO STAND,STERILE: Brand: MEDLINE

## (undated) DEVICE — CORD ES L12FT BPLR FRCP

## (undated) DEVICE — SHEET, T, LAPAROTOMY, STERILE: Brand: MEDLINE

## (undated) DEVICE — NEEDLE HYPO 25GA L1.5IN BLU POLYPR HUB S STL REG BVL STR

## (undated) DEVICE — SUTURE MONOCRYL SZ 3-0 L27IN ABSRB UD L26MM SH 1/2 CIR Y416H

## (undated) DEVICE — MITT SURG PREP L ADH DISPOSABLE

## (undated) DEVICE — APPLICATOR MEDICATED 26 CC SOLUTION HI LT ORNG CHLORAPREP

## (undated) DEVICE — BITEBLOCK 54FR W/ DENT RIM BLOX

## (undated) DEVICE — BLADE,CARBON-STEEL,15,STRL,DISPOSABLE,TB: Brand: MEDLINE

## (undated) DEVICE — LIQUIBAND RAPID ADHESIVE 36/CS 0.8ML: Brand: MEDLINE

## (undated) DEVICE — HANDHELD SINGLE-USE SURGICAL NERVE STIMULATOR: Brand: CHECKPOINT GUARDIAN

## (undated) DEVICE — SUTURE NONABSORBABLE MONOFILAMENT 6-0 PC-1 18 IN ETHILON 1856G

## (undated) DEVICE — 3M™ IOBAN™ 2 ANTIMICROBIAL INCISE DRAPE 6650EZ: Brand: IOBAN™ 2

## (undated) DEVICE — BLADE ES ELASTOMERIC COAT INSUL DURABLE BEND UPTO 90DEG

## (undated) DEVICE — SOLUTION IRRIG 1000ML STRL H2O USP PLAS POUR BTL